# Patient Record
Sex: FEMALE | Race: WHITE | Employment: OTHER | URBAN - METROPOLITAN AREA
[De-identification: names, ages, dates, MRNs, and addresses within clinical notes are randomized per-mention and may not be internally consistent; named-entity substitution may affect disease eponyms.]

---

## 2017-01-04 ENCOUNTER — OFFICE VISIT (OUTPATIENT)
Dept: INTERNAL MEDICINE | Age: 67
End: 2017-01-04

## 2017-01-04 VITALS
WEIGHT: 131.8 LBS | SYSTOLIC BLOOD PRESSURE: 118 MMHG | HEART RATE: 95 BPM | BODY MASS INDEX: 21.96 KG/M2 | HEIGHT: 65 IN | DIASTOLIC BLOOD PRESSURE: 76 MMHG

## 2017-01-04 DIAGNOSIS — J18.9 CAP (COMMUNITY ACQUIRED PNEUMONIA): Primary | ICD-10-CM

## 2017-01-04 PROCEDURE — 99213 OFFICE O/P EST LOW 20 MIN: CPT | Performed by: FAMILY MEDICINE

## 2017-01-04 RX ORDER — AZITHROMYCIN 250 MG/1
TABLET, FILM COATED ORAL
Qty: 6 TABLET | Refills: 0 | Status: SHIPPED | OUTPATIENT
Start: 2017-01-04 | End: 2017-11-16 | Stop reason: ALTCHOICE

## 2017-01-10 ENCOUNTER — TELEPHONE (OUTPATIENT)
Dept: INTERNAL MEDICINE | Age: 67
End: 2017-01-10

## 2017-01-21 DIAGNOSIS — E03.9 ACQUIRED HYPOTHYROIDISM: ICD-10-CM

## 2017-01-21 RX ORDER — LEVOTHYROXINE SODIUM 88 UG/1
TABLET ORAL
Qty: 90 TABLET | Refills: 3 | Status: SHIPPED | OUTPATIENT
Start: 2017-01-21 | End: 2017-07-05 | Stop reason: SDUPTHER

## 2017-05-04 DIAGNOSIS — N39.0 RECURRENT UTI: Primary | ICD-10-CM

## 2017-05-04 RX ORDER — SULFAMETHOXAZOLE AND TRIMETHOPRIM 800; 160 MG/1; MG/1
1 TABLET ORAL 2 TIMES DAILY
Qty: 6 TABLET | Refills: 3 | Status: SHIPPED | OUTPATIENT
Start: 2017-05-04 | End: 2017-10-12 | Stop reason: SDUPTHER

## 2017-05-12 ENCOUNTER — TELEPHONE (OUTPATIENT)
Dept: INTERNAL MEDICINE | Age: 67
End: 2017-05-12

## 2017-06-19 DIAGNOSIS — J30.2 SEASONAL ALLERGIC RHINITIS, UNSPECIFIED ALLERGIC RHINITIS TRIGGER: ICD-10-CM

## 2017-06-20 RX ORDER — FLUTICASONE PROPIONATE 50 MCG
1 SPRAY, SUSPENSION (ML) NASAL DAILY
Qty: 1 BOTTLE | Refills: 3 | Status: SHIPPED | OUTPATIENT
Start: 2017-06-20 | End: 2017-07-05 | Stop reason: SDUPTHER

## 2017-07-05 DIAGNOSIS — E03.9 ACQUIRED HYPOTHYROIDISM: ICD-10-CM

## 2017-07-05 DIAGNOSIS — E78.2 MIXED HYPERLIPIDEMIA: ICD-10-CM

## 2017-07-05 DIAGNOSIS — J30.2 SEASONAL ALLERGIC RHINITIS, UNSPECIFIED ALLERGIC RHINITIS TRIGGER: ICD-10-CM

## 2017-07-06 RX ORDER — FENOFIBRATE 160 MG/1
TABLET ORAL
Qty: 90 TABLET | Refills: 3 | Status: SHIPPED | OUTPATIENT
Start: 2017-07-06 | End: 2018-07-19 | Stop reason: ALTCHOICE

## 2017-07-06 RX ORDER — FLUTICASONE PROPIONATE 50 MCG
1 SPRAY, SUSPENSION (ML) NASAL DAILY
Qty: 3 BOTTLE | Refills: 3 | Status: SHIPPED | OUTPATIENT
Start: 2017-07-06 | End: 2019-05-21 | Stop reason: ALTCHOICE

## 2017-07-06 RX ORDER — LEVOTHYROXINE SODIUM 88 UG/1
TABLET ORAL
Qty: 90 TABLET | Refills: 3 | Status: SHIPPED | OUTPATIENT
Start: 2017-07-06 | End: 2018-04-25 | Stop reason: SDUPTHER

## 2017-10-04 ENCOUNTER — TELEPHONE (OUTPATIENT)
Dept: INTERNAL MEDICINE | Age: 67
End: 2017-10-04

## 2017-10-04 ENCOUNTER — HOSPITAL ENCOUNTER (OUTPATIENT)
Age: 67
Setting detail: SPECIMEN
Discharge: HOME | End: 2017-10-04
Payer: MEDICARE

## 2017-10-04 DIAGNOSIS — R30.0 DYSURIA: ICD-10-CM

## 2017-10-04 DIAGNOSIS — R30.0 DYSURIA: Primary | ICD-10-CM

## 2017-10-04 LAB
BILIRUBIN, POC: NORMAL
BLOOD URINE, POC: NORMAL
CLARITY, POC: CLEAR
COLOR, POC: NORMAL
GLUCOSE URINE, POC: NORMAL
KETONES, POC: NORMAL
LEUKOCYTE EST, POC: NORMAL
NITRITE, POC: NORMAL
PH, POC: 7
PROTEIN, POC: NORMAL
SPECIFIC GRAVITY, POC: 1.02
UROBILINOGEN, POC: NORMAL

## 2017-10-04 PROCEDURE — 87086 URINE CULTURE/COLONY COUNT: CPT

## 2017-10-04 PROCEDURE — 81002 URINALYSIS NONAUTO W/O SCOPE: CPT | Performed by: FAMILY MEDICINE

## 2017-10-04 NOTE — TELEPHONE ENCOUNTER
Pt brought in urine specimen stating that Dr. Merna Jimenez told her to bring in specimen when she has symptoms since she gets UTI's often. Results neg. Send for culture?

## 2017-10-06 ENCOUNTER — NURSE ONLY (OUTPATIENT)
Dept: INTERNAL MEDICINE | Age: 67
End: 2017-10-06

## 2017-10-06 DIAGNOSIS — Z23 NEED FOR INFLUENZA VACCINATION: Primary | ICD-10-CM

## 2017-10-06 LAB — URINE CULTURE, ROUTINE: NORMAL

## 2017-10-06 PROCEDURE — G0008 ADMIN INFLUENZA VIRUS VAC: HCPCS | Performed by: FAMILY MEDICINE

## 2017-10-06 PROCEDURE — 90662 IIV NO PRSV INCREASED AG IM: CPT | Performed by: FAMILY MEDICINE

## 2017-10-06 NOTE — PROGRESS NOTES
Vaccine Information Sheet, \"Influenza - Inactivated\" OR \"Live - Intranasal\"  given to Teena Mills, or parent/legal guardian of  Teena Mills and verbalized understanding. Patient responses:    Have you ever had a reaction to a flu vaccine? No  Are you able to eat eggs without adverse effects? Yes  Do you have any current illness? No  Have you ever had Gu[place] Syndrome? No    Flu vaccine given per order. Please see immunization tab.

## 2017-10-12 ENCOUNTER — TELEPHONE (OUTPATIENT)
Dept: INTERNAL MEDICINE | Age: 67
End: 2017-10-12

## 2017-10-12 ENCOUNTER — HOSPITAL ENCOUNTER (OUTPATIENT)
Age: 67
Setting detail: SPECIMEN
Discharge: HOME | End: 2017-10-12
Payer: MEDICARE

## 2017-10-12 DIAGNOSIS — R30.0 DYSURIA: Primary | ICD-10-CM

## 2017-10-12 DIAGNOSIS — R30.0 DYSURIA: ICD-10-CM

## 2017-10-12 LAB
BILIRUBIN, POC: ABNORMAL
BLOOD URINE, POC: ABNORMAL
CLARITY, POC: ABNORMAL
COLOR, POC: YELLOW
GLUCOSE URINE, POC: ABNORMAL
KETONES, POC: ABNORMAL
LEUKOCYTE EST, POC: ABNORMAL
NITRITE, POC: ABNORMAL
PH, POC: 6
PROTEIN, POC: ABNORMAL
SPECIFIC GRAVITY, POC: 1.02
UROBILINOGEN, POC: ABNORMAL

## 2017-10-12 PROCEDURE — 87186 SC STD MICRODIL/AGAR DIL: CPT

## 2017-10-12 PROCEDURE — 87077 CULTURE AEROBIC IDENTIFY: CPT

## 2017-10-12 PROCEDURE — 87086 URINE CULTURE/COLONY COUNT: CPT

## 2017-10-12 PROCEDURE — 81002 URINALYSIS NONAUTO W/O SCOPE: CPT | Performed by: FAMILY MEDICINE

## 2017-10-12 RX ORDER — SULFAMETHOXAZOLE AND TRIMETHOPRIM 800; 160 MG/1; MG/1
1 TABLET ORAL 2 TIMES DAILY
Qty: 6 TABLET | Refills: 0 | Status: SHIPPED | OUTPATIENT
Start: 2017-10-12 | End: 2017-10-15

## 2017-10-12 NOTE — TELEPHONE ENCOUNTER
Sent, this time, but I will not just do urines when Dr Armani Howell is not here.   If Dr Armani Howell is here, Dr Armani Howell can make that decision

## 2017-10-12 NOTE — TELEPHONE ENCOUNTER
Patient brought in a urine specimen apparently per DR. Manpreet Villalobos. Patient has dysuria. Poct urine dip showed    3+ [facility]  protien  Blood      Urine culture ordered.  Please advise

## 2017-10-14 LAB
ORGANISM: ABNORMAL
URINE CULTURE, ROUTINE: ABNORMAL
URINE CULTURE, ROUTINE: ABNORMAL

## 2017-10-16 RX ORDER — SULFAMETHOXAZOLE AND TRIMETHOPRIM 800; 160 MG/1; MG/1
1 TABLET ORAL 2 TIMES DAILY
Qty: 6 TABLET | Refills: 3 | Status: SHIPPED | OUTPATIENT
Start: 2017-10-16 | End: 2017-11-16 | Stop reason: SDUPTHER

## 2017-11-13 ENCOUNTER — NURSE ONLY (OUTPATIENT)
Dept: INTERNAL MEDICINE | Age: 67
End: 2017-11-13

## 2017-11-13 ENCOUNTER — HOSPITAL ENCOUNTER (OUTPATIENT)
Age: 67
Setting detail: SPECIMEN
Discharge: HOME | End: 2017-11-13
Payer: MEDICARE

## 2017-11-13 DIAGNOSIS — E78.5 HYPERLIPIDEMIA, UNSPECIFIED HYPERLIPIDEMIA TYPE: ICD-10-CM

## 2017-11-13 DIAGNOSIS — E03.9 ACQUIRED HYPOTHYROIDISM: ICD-10-CM

## 2017-11-13 DIAGNOSIS — Z12.11 COLON CANCER SCREENING: ICD-10-CM

## 2017-11-13 DIAGNOSIS — E78.5 HYPERLIPIDEMIA, UNSPECIFIED HYPERLIPIDEMIA TYPE: Primary | ICD-10-CM

## 2017-11-13 DIAGNOSIS — Z23 NEED FOR PNEUMOCOCCAL VACCINATION: ICD-10-CM

## 2017-11-13 LAB
ALBUMIN SERPL-MCNC: 4.4 G/DL (ref 3.9–4.9)
ALP BLD-CCNC: 39 U/L (ref 40–130)
ALT SERPL-CCNC: 10 U/L (ref 0–33)
ANION GAP SERPL CALCULATED.3IONS-SCNC: 15 MEQ/L (ref 7–13)
AST SERPL-CCNC: 19 U/L (ref 0–35)
BASOPHILS ABSOLUTE: 0 K/UL (ref 0–0.2)
BASOPHILS RELATIVE PERCENT: 0.9 %
BILIRUB SERPL-MCNC: 0.3 MG/DL (ref 0–1.2)
BUN BLDV-MCNC: 14 MG/DL (ref 8–23)
CALCIUM SERPL-MCNC: 9.9 MG/DL (ref 8.6–10.2)
CHLORIDE BLD-SCNC: 102 MEQ/L (ref 98–107)
CHOLESTEROL, TOTAL: 214 MG/DL (ref 0–199)
CO2: 25 MEQ/L (ref 22–29)
CREAT SERPL-MCNC: 0.76 MG/DL (ref 0.5–0.9)
EOSINOPHILS ABSOLUTE: 0.1 K/UL (ref 0–0.7)
EOSINOPHILS RELATIVE PERCENT: 1.5 %
GFR AFRICAN AMERICAN: >60
GFR NON-AFRICAN AMERICAN: >60
GLOBULIN: 2.3 G/DL (ref 2.3–3.5)
GLUCOSE BLD-MCNC: 94 MG/DL (ref 74–109)
HCT VFR BLD CALC: 38.5 % (ref 37–47)
HDLC SERPL-MCNC: 59 MG/DL (ref 40–59)
HEMOGLOBIN: 12.4 G/DL (ref 12–16)
LDL CHOLESTEROL CALCULATED: 140 MG/DL (ref 0–129)
LYMPHOCYTES ABSOLUTE: 1.3 K/UL (ref 1–4.8)
LYMPHOCYTES RELATIVE PERCENT: 31.6 %
MCH RBC QN AUTO: 29.4 PG (ref 27–31.3)
MCHC RBC AUTO-ENTMCNC: 32.3 % (ref 33–37)
MCV RBC AUTO: 90.8 FL (ref 82–100)
MONOCYTES ABSOLUTE: 0.4 K/UL (ref 0.2–0.8)
MONOCYTES RELATIVE PERCENT: 9.9 %
NEUTROPHILS ABSOLUTE: 2.3 K/UL (ref 1.4–6.5)
NEUTROPHILS RELATIVE PERCENT: 56.1 %
PDW BLD-RTO: 14.6 % (ref 11.5–14.5)
PLATELET # BLD: 294 K/UL (ref 130–400)
POTASSIUM SERPL-SCNC: 4.8 MEQ/L (ref 3.5–5.1)
RBC # BLD: 4.23 M/UL (ref 4.2–5.4)
SODIUM BLD-SCNC: 142 MEQ/L (ref 132–144)
TOTAL PROTEIN: 6.7 G/DL (ref 6.4–8.1)
TRIGL SERPL-MCNC: 74 MG/DL (ref 0–200)
TSH REFLEX: 3.16 UIU/ML (ref 0.27–4.2)
WBC # BLD: 4.1 K/UL (ref 4.8–10.8)

## 2017-11-13 PROCEDURE — 90670 PCV13 VACCINE IM: CPT | Performed by: FAMILY MEDICINE

## 2017-11-13 PROCEDURE — 80053 COMPREHEN METABOLIC PANEL: CPT

## 2017-11-13 PROCEDURE — 36415 COLL VENOUS BLD VENIPUNCTURE: CPT | Performed by: FAMILY MEDICINE

## 2017-11-13 PROCEDURE — 85025 COMPLETE CBC W/AUTO DIFF WBC: CPT

## 2017-11-13 PROCEDURE — 84443 ASSAY THYROID STIM HORMONE: CPT

## 2017-11-13 PROCEDURE — G0009 ADMIN PNEUMOCOCCAL VACCINE: HCPCS | Performed by: FAMILY MEDICINE

## 2017-11-13 PROCEDURE — 80061 LIPID PANEL: CPT

## 2017-11-16 ENCOUNTER — OFFICE VISIT (OUTPATIENT)
Dept: INTERNAL MEDICINE | Age: 67
End: 2017-11-16

## 2017-11-16 VITALS
HEART RATE: 90 BPM | HEIGHT: 65 IN | DIASTOLIC BLOOD PRESSURE: 74 MMHG | BODY MASS INDEX: 22.46 KG/M2 | WEIGHT: 134.8 LBS | SYSTOLIC BLOOD PRESSURE: 128 MMHG

## 2017-11-16 DIAGNOSIS — Z00.00 ROUTINE GENERAL MEDICAL EXAMINATION AT A HEALTH CARE FACILITY: Primary | ICD-10-CM

## 2017-11-16 DIAGNOSIS — Z12.11 COLON CANCER SCREENING: ICD-10-CM

## 2017-11-16 LAB
CONTROL: NORMAL
HEMOCCULT STL QL: NEGATIVE

## 2017-11-16 PROCEDURE — 82274 ASSAY TEST FOR BLOOD FECAL: CPT | Performed by: FAMILY MEDICINE

## 2017-11-16 PROCEDURE — G0438 PPPS, INITIAL VISIT: HCPCS | Performed by: FAMILY MEDICINE

## 2017-11-16 RX ORDER — SULFAMETHOXAZOLE AND TRIMETHOPRIM 800; 160 MG/1; MG/1
1 TABLET ORAL 2 TIMES DAILY
Qty: 12 TABLET | Refills: 3 | Status: SHIPPED | OUTPATIENT
Start: 2017-11-16 | End: 2018-11-27 | Stop reason: SDUPTHER

## 2017-11-16 ASSESSMENT — LIFESTYLE VARIABLES
HOW OFTEN DO YOU HAVE SIX OR MORE DRINKS ON ONE OCCASION: 0
HOW OFTEN DO YOU HAVE A DRINK CONTAINING ALCOHOL: 2
HOW MANY STANDARD DRINKS CONTAINING ALCOHOL DO YOU HAVE ON A TYPICAL DAY: 0
AUDIT-C TOTAL SCORE: 2

## 2017-11-16 ASSESSMENT — ANXIETY QUESTIONNAIRES: GAD7 TOTAL SCORE: 0

## 2017-11-16 ASSESSMENT — PATIENT HEALTH QUESTIONNAIRE - PHQ9: SUM OF ALL RESPONSES TO PHQ QUESTIONS 1-9: 0

## 2017-11-16 NOTE — PROGRESS NOTES
Medicare Annual Wellness Visit  Name: Magaly Vazquez Date: 2017   MRN: [id number] Sex: Female   Age: 74 y.o. Ethnicity: Non-/Non    : 1950 Race: Shantanu Pederson is here for Medicare AWV    Screenings for behavioral, psychosocial and functional/safety risks, and cognitive dysfunction are all negative except as indicated below. These results, as well as other patient data from the [address] form, are documented in Flowsheets linked to this Encounter. No Known Allergies    Prior to Visit Medications    Medication Sig Taking? Authorizing Provider   sulfamethoxazole-trimethoprim (BACTRIM DS) 800-160 MG per tablet Take 1 tablet by mouth 2 times daily for 6 days Yes Mk Barbosa MD   fluticasone (FLONASE) 50 MCG/ACT nasal spray 1 spray by Nasal route daily Yes Mk Barbosa MD   levothyroxine (SYNTHROID) 88 MCG tablet TAKE 1 TABLET BY MOUTH DAILY Yes Mk Barbosa MD   fenofibrate 160 MG tablet TAKE 1 TABLET BY MOUTH DAILY Yes Mk Barbosa MD   sertraline (ZOLOFT) 25 MG tablet Take 1 tablet by mouth daily Yes Mk Barbosa MD   levothyroxine (LEVOTHROID) 88 MCG tablet Take 1 tablet by mouth daily Yes Mk Barbosa MD   nitrofurantoin (MACRODANTIN) 50 MG capsule Take 1 capsule by mouth Twice a Week  Patient taking differently: Take 50 mg by mouth Twice a Week Taking every other day. Yes Mk Barbosa MD   Probiotic Product (PROBIOTIC DAILY PO) Take 1 tablet by mouth daily. Yes Historical Provider, MD   aspirin 81 MG tablet Take 81 mg by mouth daily. Yes Historical Provider, MD   Omega 3 1000 MG CAPS Take 1 Can by mouth daily. Yes Historical Provider, MD   [facility] by Does not apply route. Yes Historical Provider, MD   therapeutic multivitamin-minerals (THERAGRAN-M) tablet Take 1 tablet by mouth daily. Yes Historical Provider, MD   Misc Natural Products (LUTEIN 20 PO) Take  by mouth.  Yes Historical Provider, MD   BIOTIN FORTE PO Take  by mouth. Yes Historical Provider, MD   Vitamin D (CHOLECALCIFEROL) 1000 UNITS CAPS capsule Take 1,000 Units by mouth daily. Yes Historical Provider, MD       Past Medical History:   Diagnosis Date    Abnormal white blood cell (WBC) count 11/15/2016    Hiatal hernia     Hyperlipidemia     Hypothyroidism     Pap smear for cervical cancer screening 9/2011    normal    prophylaxis for Recurrent UTI 12/18/2013    Screening mammogram 9/2011    normal     Past Surgical History:   Procedure Laterality Date    CHOLECYSTECTOMY      COLONOSCOPY      when she turned 50, normal    KNEE SURGERY      right knee replacement       Family History   Problem Relation Age of Onset    High Cholesterol Mother     High Cholesterol Father        CareTeam (Including outside providers/suppliers regularly involved in providing care):   Patient Care Team:  Ravi Barone MD as PCP - General (Family Medicine)  Bo Hernandez,  as Surgeon (Orthopedic Surgery)  Karan Mccall DO as Consulting Physician (Obstetrics & Gynecology)  Jeremie Martini (Optometry)    Wt Readings from Last 3 Encounters:   11/16/17 134 lb 12.8 oz (61.1 kg)   01/04/17 131 lb 12.8 oz (59.8 kg)   11/15/16 133 lb (60.3 kg)     Vitals:    11/16/17 0905   BP: 128/74   Site: Right Arm   Position: Sitting   Cuff Size: Medium Adult   Pulse: 90   Weight: 134 lb 12.8 oz (61.1 kg)   Height: 5' 5\" (1.651 m)       Patient's complete Health Risk Assessment and screening values have been reviewed and are found in Flowsheets. The following problems were reviewed today and where indicated follow up appointments were made and/or referrals ordered.     Positive Risk Factor Screenings with Interventions:     Safety:  Safety  Do you have working smoke detectors?: Yes  Have all throw rugs been removed or fastened?: (!) No  Do you have non-slip mats in all bathtubs?: (!) No  Do all of your stairways have a railing or banister?: Yes  Are your doorways, halls and stairs free of clutter?: Yes  Do you always fasten your seatbelt when you are in a car?: Yes  Safety Interventions:  · Home safety tips provided      Personalized Preventive Plan   Current Health Maintenance Status  Immunization History   Administered Date(s) Administered    Influenza Virus Vaccine 10/26/2011, 11/06/2013, 10/01/2014, 09/30/2015    Influenza, High Dose 10/06/2017    Influenza, Quadv, 3 Years and older, IM 11/11/2016    Pneumococcal 13-valent Conjugate (Marny Batter) 11/13/2017    Tdap (Boostrix, Adacel) 12/05/2013        Health Maintenance   Topic Date Due    HIV screen  11/16/2018 (Originally 5/22/1965)    Cervical cancer screen  10/14/2018    Pneumococcal low/med risk (2 of 2 - PPSV23) 11/13/2018    Colon Cancer Screen FIT/FOBT  11/16/2018    Breast cancer screen  10/13/2019    Lipid screen  11/13/2022    DTaP/Tdap/Td vaccine (2 - Td) 12/05/2023    Zostavax vaccine  Addressed    DEXA (modify frequency per FRAX score)  Completed    Flu vaccine  Completed    Hepatitis C screen  Completed     Recommendations for Preventive Services Due: see orders. Recommended screening schedule for the next 5-10 years is provided to the patient in written form: see Patient Instructions/AVS.    Cardiovascular Disease Risk Counseling: Assessed the patient's risk to develop cardiovascular disease and reviewed main risk factors.    Reviewed steps to reduce disease risk including:   · Quitting tobacco use, reducing amount smoked, or not starting the habit  · Making healthy food choices  · Being physically active and gradualy increasing activity levels   · Reduce weight and determine a healthy BMI goal  · Monitor blood pressure and treat if higher than 140/90 mmHg  · Maintain blood total cholesterol levels under 5 mmol/l or 190 mg/dl  · Maintain LDL cholesterol levels under 3.0 mmol/l or 115 mg/dl   · Control blood glucose levels  · Consider taking aspirin (75 mg daily), once blood pressure is controlled   Provided a follow up plan. Time spent (minutes): 5      Medicare Annual Wellness Visit  Name: Edelmira Cardenas Date: 2017   MRN: [id number] Sex: Female   Age: 74 y.o. Ethnicity: Non-/Non    : 1950 Race: Inga Peguero is here for Medicare AWV    Screenings for behavioral, psychosocial and functional/safety risks, and cognitive dysfunction are all negative except as indicated below. These results, as well as other patient data from the [address] form, are documented in Flowsheets linked to this Encounter. No Known Allergies    Prior to Visit Medications    Medication Sig Taking? Authorizing Provider   sulfamethoxazole-trimethoprim (BACTRIM DS) 800-160 MG per tablet Take 1 tablet by mouth 2 times daily for 6 days Yes Easter Bence, MD   fluticasone (FLONASE) 50 MCG/ACT nasal spray 1 spray by Nasal route daily Yes Easter Bence, MD   levothyroxine (SYNTHROID) 88 MCG tablet TAKE 1 TABLET BY MOUTH DAILY Yes Easter Bence, MD   fenofibrate 160 MG tablet TAKE 1 TABLET BY MOUTH DAILY Yes Easter Bence, MD   sertraline (ZOLOFT) 25 MG tablet Take 1 tablet by mouth daily Yes Easter Bence, MD   levothyroxine (LEVOTHROID) 88 MCG tablet Take 1 tablet by mouth daily Yes Easter Bence, MD   nitrofurantoin (MACRODANTIN) 50 MG capsule Take 1 capsule by mouth Twice a Week  Patient taking differently: Take 50 mg by mouth Twice a Week Taking every other day. Yes Easter Bence, MD   Probiotic Product (PROBIOTIC DAILY PO) Take 1 tablet by mouth daily. Yes Historical Provider, MD   aspirin 81 MG tablet Take 81 mg by mouth daily. Yes Historical Provider, MD   Omega 3 1000 MG CAPS Take 1 Can by mouth daily. Yes Historical Provider, MD   [facility] by Does not apply route. Yes Historical Provider, MD   therapeutic multivitamin-minerals (THERAGRAN-M) tablet Take 1 tablet by mouth daily. Yes Historical Provider, MD   Misc Natural Products (LUTEIN 20 PO) Take  by mouth.  Yes Historical

## 2017-11-16 NOTE — PATIENT INSTRUCTIONS
Personalized Preventive Plan for Constantine Mortimer - 11/16/2017  Medicare offers a range of preventive health benefits. Some of the tests and screenings are paid in full while other may be subject to a deductible, co-insurance, and/or copay. Some of these benefits include a comprehensive review of your medical history including lifestyle, illnesses that may run in your family, and various assessments and screenings as appropriate. After reviewing your medical record and screening and assessments performed today your provider may have ordered immunizations, labs, imaging, and/or referrals for you. A list of these orders (if applicable) as well as your Preventive Care list are included within your After Visit Summary for your review. Other Preventive Recommendations:    · A preventive eye exam performed by an eye specialist is recommended every 1-2 years to screen for glaucoma; cataracts, macular degeneration, and other eye disorders. · A preventive dental visit is recommended every 6 months. · Try to get at least 150 minutes of exercise per week or 10,000 steps per day on a pedometer . · Order or download the FREE \"Exercise & Physical Activity: Your Everyday Guide\" from The [employer]. Call 0-411.577.9095 or search The [employer] online. · You need 2052-0875 mg of calcium and 4460-2945 IU of vitamin D per day. It is possible to meet your calcium requirement with diet alone, but a vitamin D supplement is usually necessary to meet this goal.  · When exposed to the sun, use a sunscreen that protects against both UVA and UVB radiation with an SPF of 30 or greater. Reapply every 2 to 3 hours or after sweating, drying off with a towel, or swimming. · Always wear a seat belt when traveling in a car. Always wear a helmet when riding a bicycle or motorcycle.

## 2017-11-19 ENCOUNTER — OFFICE VISIT (OUTPATIENT)
Dept: FAMILY MEDICINE CLINIC | Age: 67
End: 2017-11-19

## 2017-11-19 VITALS
HEART RATE: 87 BPM | HEIGHT: 65 IN | BODY MASS INDEX: 22.72 KG/M2 | SYSTOLIC BLOOD PRESSURE: 124 MMHG | WEIGHT: 136.38 LBS | OXYGEN SATURATION: 99 % | RESPIRATION RATE: 14 BRPM | TEMPERATURE: 97.8 F | DIASTOLIC BLOOD PRESSURE: 72 MMHG

## 2017-11-19 DIAGNOSIS — H66.002 ACUTE SUPPURATIVE OTITIS MEDIA OF LEFT EAR WITHOUT SPONTANEOUS RUPTURE OF TYMPANIC MEMBRANE, RECURRENCE NOT SPECIFIED: Primary | ICD-10-CM

## 2017-11-19 PROCEDURE — G8420 CALC BMI NORM PARAMETERS: HCPCS | Performed by: NURSE PRACTITIONER

## 2017-11-19 PROCEDURE — G8427 DOCREV CUR MEDS BY ELIG CLIN: HCPCS | Performed by: NURSE PRACTITIONER

## 2017-11-19 PROCEDURE — 3014F SCREEN MAMMO DOC REV: CPT | Performed by: NURSE PRACTITIONER

## 2017-11-19 PROCEDURE — 1123F ACP DISCUSS/DSCN MKR DOCD: CPT | Performed by: NURSE PRACTITIONER

## 2017-11-19 PROCEDURE — 4040F PNEUMOC VAC/ADMIN/RCVD: CPT | Performed by: NURSE PRACTITIONER

## 2017-11-19 PROCEDURE — 3017F COLORECTAL CA SCREEN DOC REV: CPT | Performed by: NURSE PRACTITIONER

## 2017-11-19 PROCEDURE — 99213 OFFICE O/P EST LOW 20 MIN: CPT | Performed by: NURSE PRACTITIONER

## 2017-11-19 PROCEDURE — G8484 FLU IMMUNIZE NO ADMIN: HCPCS | Performed by: NURSE PRACTITIONER

## 2017-11-19 PROCEDURE — G8400 PT W/DXA NO RESULTS DOC: HCPCS | Performed by: NURSE PRACTITIONER

## 2017-11-19 PROCEDURE — 1090F PRES/ABSN URINE INCON ASSESS: CPT | Performed by: NURSE PRACTITIONER

## 2017-11-19 PROCEDURE — 1036F TOBACCO NON-USER: CPT | Performed by: NURSE PRACTITIONER

## 2017-11-19 RX ORDER — AMOXICILLIN AND CLAVULANATE POTASSIUM 875; 125 MG/1; MG/1
1 TABLET, FILM COATED ORAL 2 TIMES DAILY
Qty: 20 TABLET | Refills: 0 | Status: SHIPPED | OUTPATIENT
Start: 2017-11-19 | End: 2017-11-29

## 2017-11-19 RX ORDER — METHYLPREDNISOLONE 4 MG/1
TABLET ORAL
Qty: 1 KIT | Refills: 0 | Status: SHIPPED | OUTPATIENT
Start: 2017-11-19 | End: 2018-07-19 | Stop reason: ALTCHOICE

## 2017-11-22 ASSESSMENT — ENCOUNTER SYMPTOMS
SINUS PRESSURE: 0
SORE THROAT: 0
NAUSEA: 0
COUGH: 0
WHEEZING: 0
SHORTNESS OF BREATH: 0
EYE DISCHARGE: 0

## 2017-11-23 NOTE — PROGRESS NOTES
Subjective:      Patient ID: Teena Mills is a 74 y.o. female who presents today for:  Chief Complaint   Patient presents with    Otalgia     x 4 days. complains of left ear pain, swelling. denies sore throat, swollen glands. has tried Advil with moderate relief. Otalgia    There is pain in the left ear. This is a new problem. The current episode started in the past 7 days. The problem occurs constantly. The problem has been gradually worsening. There has been no fever. Associated symptoms include headaches. Pertinent negatives include no coughing, ear discharge, hearing loss, neck pain or sore throat. She has tried NSAIDs for the symptoms. The treatment provided mild relief. There is no history of a chronic ear infection. Past Medical History:   Diagnosis Date    Abnormal white blood cell (WBC) count 11/15/2016    Hiatal hernia     Hyperlipidemia     Hypothyroidism     Pap smear for cervical cancer screening 9/2011    normal    prophylaxis for Recurrent UTI 12/18/2013    Screening mammogram 9/2011    normal     Current Outpatient Prescriptions on File Prior to Visit   Medication Sig Dispense Refill    sulfamethoxazole-trimethoprim (BACTRIM DS) 800-160 MG per tablet Take 1 tablet by mouth 2 times daily for 6 days 12 tablet 3    fluticasone (FLONASE) 50 MCG/ACT nasal spray 1 spray by Nasal route daily 3 Bottle 3    levothyroxine (SYNTHROID) 88 MCG tablet TAKE 1 TABLET BY MOUTH DAILY 90 tablet 3    fenofibrate 160 MG tablet TAKE 1 TABLET BY MOUTH DAILY 90 tablet 3    sertraline (ZOLOFT) 25 MG tablet Take 1 tablet by mouth daily 30 tablet 3    levothyroxine (LEVOTHROID) 88 MCG tablet Take 1 tablet by mouth daily 90 tablet 3    nitrofurantoin (MACRODANTIN) 50 MG capsule Take 1 capsule by mouth Twice a Week (Patient taking differently: Take 50 mg by mouth Twice a Week Taking every other day.) 90 capsule 1    Probiotic Product (PROBIOTIC DAILY PO) Take 1 tablet by mouth daily.       aspirin 81 MG tablet Take 81 mg by mouth daily.  Omega 3 1000 MG CAPS Take 1 Can by mouth daily.  ECHINACEA by Does not apply route.  therapeutic multivitamin-minerals (THERAGRAN-M) tablet Take 1 tablet by mouth daily.  Misc Natural Products (LUTEIN 20 PO) Take  by mouth.  BIOTIN FORTE PO Take  by mouth.  Vitamin D (CHOLECALCIFEROL) 1000 UNITS CAPS capsule Take 1,000 Units by mouth daily. No current facility-administered medications on file prior to visit. Past Surgical History:   Procedure Laterality Date    CHOLECYSTECTOMY      COLONOSCOPY      when she turned 50, normal    KNEE SURGERY      right knee replacement     Family History   Problem Relation Age of Onset    High Cholesterol Mother     High Cholesterol Father      Social History     Social History    Marital status:      Spouse name: N/A    Number of children: N/A    Years of education: N/A     Occupational History    Not on file. Social History Main Topics    Smoking status: Never Smoker    Smokeless tobacco: Never Used    Alcohol use 0.0 oz/week      Comment: socially, moderately    Drug use: No    Sexual activity: Not on file     Other Topics Concern    Not on file     Social History Narrative    No narrative on file     Allergies:  Review of patient's allergies indicates no known allergies. Review of Systems   Constitutional: Negative for chills, diaphoresis and fever. HENT: Positive for ear pain. Negative for congestion, ear discharge, hearing loss, postnasal drip, sinus pressure and sore throat. Eyes: Negative for discharge and visual disturbance. Respiratory: Negative for cough, shortness of breath and wheezing. Cardiovascular: Negative for chest pain and palpitations. Gastrointestinal: Negative for nausea. Musculoskeletal: Negative for neck pain. Neurological: Positive for headaches. Negative for dizziness, syncope and light-headedness.        Objective:   /74 Side effects, adverse effects of the medication prescribed today, as well as treatment plan/ rationale and result expectations have been discussed with the patient who expresses understanding and desires to proceed. Pt instructions reviewed and given to patient.     Close follow up to evaluate treatment results and for coordination of care. I have reviewed the patient's medical history in detail and updated the computerized patient record.     Kenia Rogers NP

## 2018-04-25 DIAGNOSIS — E03.9 ACQUIRED HYPOTHYROIDISM: ICD-10-CM

## 2018-04-25 RX ORDER — LEVOTHYROXINE SODIUM 88 UG/1
TABLET ORAL
Qty: 90 TABLET | Refills: 3 | Status: SHIPPED | OUTPATIENT
Start: 2018-04-25 | End: 2018-07-19 | Stop reason: SDUPTHER

## 2018-07-19 ENCOUNTER — OFFICE VISIT (OUTPATIENT)
Dept: INTERNAL MEDICINE | Age: 68
End: 2018-07-19
Payer: MEDICARE

## 2018-07-19 VITALS
WEIGHT: 135 LBS | BODY MASS INDEX: 22.49 KG/M2 | DIASTOLIC BLOOD PRESSURE: 78 MMHG | HEIGHT: 65 IN | SYSTOLIC BLOOD PRESSURE: 138 MMHG | OXYGEN SATURATION: 99 % | HEART RATE: 86 BPM

## 2018-07-19 DIAGNOSIS — S13.9XXA NECK SPRAIN, INITIAL ENCOUNTER: Primary | ICD-10-CM

## 2018-07-19 PROCEDURE — 3014F SCREEN MAMMO DOC REV: CPT | Performed by: FAMILY MEDICINE

## 2018-07-19 PROCEDURE — 1101F PT FALLS ASSESS-DOCD LE1/YR: CPT | Performed by: FAMILY MEDICINE

## 2018-07-19 PROCEDURE — G8427 DOCREV CUR MEDS BY ELIG CLIN: HCPCS | Performed by: FAMILY MEDICINE

## 2018-07-19 PROCEDURE — 1036F TOBACCO NON-USER: CPT | Performed by: FAMILY MEDICINE

## 2018-07-19 PROCEDURE — 1123F ACP DISCUSS/DSCN MKR DOCD: CPT | Performed by: FAMILY MEDICINE

## 2018-07-19 PROCEDURE — G8400 PT W/DXA NO RESULTS DOC: HCPCS | Performed by: FAMILY MEDICINE

## 2018-07-19 PROCEDURE — 3017F COLORECTAL CA SCREEN DOC REV: CPT | Performed by: FAMILY MEDICINE

## 2018-07-19 PROCEDURE — 4040F PNEUMOC VAC/ADMIN/RCVD: CPT | Performed by: FAMILY MEDICINE

## 2018-07-19 PROCEDURE — 99214 OFFICE O/P EST MOD 30 MIN: CPT | Performed by: FAMILY MEDICINE

## 2018-07-19 PROCEDURE — G8420 CALC BMI NORM PARAMETERS: HCPCS | Performed by: FAMILY MEDICINE

## 2018-07-19 PROCEDURE — 1090F PRES/ABSN URINE INCON ASSESS: CPT | Performed by: FAMILY MEDICINE

## 2018-07-19 RX ORDER — METHYLPREDNISOLONE 4 MG/1
TABLET ORAL
Qty: 1 KIT | Refills: 0 | Status: SHIPPED | OUTPATIENT
Start: 2018-07-19 | End: 2018-07-28

## 2018-07-19 RX ORDER — TIZANIDINE 4 MG/1
4 TABLET ORAL NIGHTLY
Qty: 10 TABLET | Refills: 0 | Status: SHIPPED | OUTPATIENT
Start: 2018-07-19 | End: 2018-07-28 | Stop reason: SDUPTHER

## 2018-07-19 NOTE — PROGRESS NOTES
therapeutic multivitamin-minerals (THERAGRAN-M) tablet Take 1 tablet by mouth daily.  Misc Natural Products (LUTEIN 20 PO) Take  by mouth.  BIOTIN FORTE PO Take  by mouth.  Vitamin D (CHOLECALCIFEROL) 1000 UNITS CAPS capsule Take 1,000 Units by mouth daily. No current facility-administered medications on file prior to visit. No Known Allergies    Chief Complaint   Patient presents with    Muscle Pain     right side neck and shoulder, no injury. started after doing PT for knee injury. pain just not going away. HPI  Symptoms:neck sprain  Location:right side of the neck, radiates down to shoulder  Quality:dull, ache  Severity:8/10 at worst, 5/10  Duration:since end of March  Timing:constant  Context:was doing PT for total knee on the right when she did an exercise that caused her right sided neck pain   Alleviating/aggravting factors:worse with movement   Muscle relaxer x 2 months did not help  Associated signs & symptoms:  Limited ROM looking to the right   No numbness/tingling or weakness in the right arm       Review of Systems  Metabolic/Endocrine: Negative for cold intolerance, heat intolerance, polydipsia and polyphagia. No unintended weight loss or weight gain. Neuro/Psychiatric: Negative for gait disturbance. Negative for psychiatric symptoms. Dermatologic: Negative for pruritus and rash. Musculoskeletal: + muscle pain neck. .    Physical Exam  Vitals:    07/19/18 0844   BP: 138/78   Pulse: 86   SpO2: 99%   Body mass index is 22.47 kg/m². Physical Exam   Neck: Neck supple. Muscular tenderness present. No spinous process tenderness present. Decreased range of motion present. Constitutional:  Appears well-developed and well-nourished. No distress. HENT:   Head: Normocephalic and atraumatic. Right Ear: External ear normal.   Left Ear: External ear normal.   Nose: Nose normal.   Eyes: Conjunctivae and EOM are normal.  Right eye exhibits no discharge.  Left eye exhibits no discharge. No scleral icterus. Musculoskeletal: Normal range of motion. Neurological: alert. Skin: not diaphoretic. Psychiatric: normal mood and affect. behavior is normal. Judgment and thought content normal.   Nursing note and vitals reviewed. Assessment:  Ashia Sauer was seen today for muscle pain. Diagnoses and all orders for this visit:    Neck sprain, initial encounter  -     methylPREDNISolone (MEDROL, DARIA,) 4 MG tablet; Take by mouth. -     tiZANidine (ZANAFLEX) 4 MG tablet; Take 1 tablet by mouth nightly    hand out provided, had a lengthy discussion with patient about treatment, it's side effects, the diagnosis & etiology of symptoms, along with management and expectations of outcome with the recommended treatment. Patient verbalized understanding. Return if symptoms worsen or fail to improve.

## 2018-07-19 NOTE — PATIENT INSTRUCTIONS
Patient Education        Neck Strain or Sprain: Rehab Exercises  Your Care Instructions  Here are some examples of typical rehabilitation exercises for your condition. Start each exercise slowly. Ease off the exercise if you start to have pain. Your doctor or physical therapist will tell you when you can start these exercises and which ones will work best for you. How to do the exercises  Neck rotation    1. Sit in a firm chair, or stand up straight. 2. Keeping your chin level, turn your head to the right, and hold for 15 to 30 seconds. 3. Turn your head to the left and hold for 15 to 30 seconds. 4. Repeat 2 to 4 times to each side. Neck stretches    1. Look straight ahead, and tip your right ear to your right shoulder. Do not let your left shoulder rise up as you tip your head to the right. 2. Hold for 15 to 30 seconds. 3. Tilt your head to the left. Do not let your right shoulder rise up as you tip your head to the left. 4. Hold for 15 to 30 seconds. 5. Repeat 2 to 4 times to each side. Forward neck flexion    1. Sit in a firm chair, or stand up straight. 2. Bend your head forward. 3. Hold for 15 to 30 seconds. 4. Repeat 2 to 4 times. Lateral (side) bend strengthening    1. With your right hand, place your first two fingers on your right temple. 2. Start to bend your head to the side while using gentle pressure from your fingers to keep your head from bending. 3. Hold for about 6 seconds. 4. Repeat 8 to 12 times. 5. Switch hands and repeat the same exercise on your left side. Forward bend strengthening    1. Place your first two fingers of either hand on your forehead. 2. Start to bend your head forward while using gentle pressure from your fingers to keep your head from bending. 3. Hold for about 6 seconds. 4. Repeat 8 to 12 times. Neutral position strengthening    1. Using one hand, place your fingertips on the back of your head at the top of your neck.   2. Start to bend your head

## 2018-07-27 ENCOUNTER — TELEPHONE (OUTPATIENT)
Dept: INTERNAL MEDICINE | Age: 68
End: 2018-07-27

## 2018-07-27 DIAGNOSIS — S13.9XXA NECK SPRAIN, INITIAL ENCOUNTER: ICD-10-CM

## 2018-07-28 RX ORDER — TIZANIDINE 4 MG/1
4 TABLET ORAL NIGHTLY
Qty: 20 TABLET | Refills: 0 | Status: SHIPPED | OUTPATIENT
Start: 2018-07-28 | End: 2019-05-09

## 2018-07-30 ENCOUNTER — TELEPHONE (OUTPATIENT)
Dept: INTERNAL MEDICINE | Age: 68
End: 2018-07-30

## 2018-07-30 NOTE — TELEPHONE ENCOUNTER
Sathish Uri called her insurance and [employer] about the compound formula for her neck cream.  She said the phone is [phone]  Confirmation number [id number]

## 2018-10-05 ENCOUNTER — NURSE ONLY (OUTPATIENT)
Dept: INTERNAL MEDICINE | Age: 68
End: 2018-10-05
Payer: MEDICARE

## 2018-10-05 DIAGNOSIS — Z23 NEED FOR INFLUENZA VACCINATION: Primary | ICD-10-CM

## 2018-10-05 PROCEDURE — G0008 ADMIN INFLUENZA VIRUS VAC: HCPCS | Performed by: FAMILY MEDICINE

## 2018-10-05 PROCEDURE — 90662 IIV NO PRSV INCREASED AG IM: CPT | Performed by: FAMILY MEDICINE

## 2018-11-14 ENCOUNTER — NURSE ONLY (OUTPATIENT)
Dept: INTERNAL MEDICINE | Age: 68
End: 2018-11-14

## 2018-11-14 DIAGNOSIS — E03.9 ACQUIRED HYPOTHYROIDISM: ICD-10-CM

## 2018-11-14 DIAGNOSIS — D72.9 ABNORMAL WHITE BLOOD CELL (WBC) COUNT: ICD-10-CM

## 2018-11-14 DIAGNOSIS — E78.5 HYPERLIPIDEMIA, UNSPECIFIED HYPERLIPIDEMIA TYPE: ICD-10-CM

## 2018-11-14 DIAGNOSIS — E03.9 ACQUIRED HYPOTHYROIDISM: Primary | ICD-10-CM

## 2018-11-14 DIAGNOSIS — E78.5 HYPERLIPIDEMIA, UNSPECIFIED HYPERLIPIDEMIA TYPE: Primary | ICD-10-CM

## 2018-11-14 LAB
ALBUMIN SERPL-MCNC: 4.7 G/DL (ref 3.9–4.9)
ALP BLD-CCNC: 67 U/L (ref 40–130)
ALT SERPL-CCNC: 13 U/L (ref 0–33)
ANION GAP SERPL CALCULATED.3IONS-SCNC: 14 MEQ/L (ref 7–13)
AST SERPL-CCNC: 20 U/L (ref 0–35)
BASOPHILS ABSOLUTE: 0 K/UL (ref 0–0.2)
BASOPHILS RELATIVE PERCENT: 0.6 %
BILIRUB SERPL-MCNC: 0.4 MG/DL (ref 0–1.2)
BUN BLDV-MCNC: 14 MG/DL (ref 8–23)
CALCIUM SERPL-MCNC: 10.2 MG/DL (ref 8.6–10.2)
CHLORIDE BLD-SCNC: 100 MEQ/L (ref 98–107)
CHOLESTEROL, TOTAL: 265 MG/DL (ref 0–199)
CO2: 28 MEQ/L (ref 22–29)
CREAT SERPL-MCNC: 0.61 MG/DL (ref 0.5–0.9)
EOSINOPHILS ABSOLUTE: 0.1 K/UL (ref 0–0.7)
EOSINOPHILS RELATIVE PERCENT: 1.4 %
GFR AFRICAN AMERICAN: >60
GFR NON-AFRICAN AMERICAN: >60
GLOBULIN: 2.9 G/DL (ref 2.3–3.5)
GLUCOSE BLD-MCNC: 98 MG/DL (ref 74–109)
HCT VFR BLD CALC: 41.2 % (ref 37–47)
HDLC SERPL-MCNC: 58 MG/DL (ref 40–59)
HEMOGLOBIN: 14.1 G/DL (ref 12–16)
LDL CHOLESTEROL CALCULATED: 184 MG/DL (ref 0–129)
LYMPHOCYTES ABSOLUTE: 1.5 K/UL (ref 1–4.8)
LYMPHOCYTES RELATIVE PERCENT: 22.3 %
MCH RBC QN AUTO: 31.3 PG (ref 27–31.3)
MCHC RBC AUTO-ENTMCNC: 34.3 % (ref 33–37)
MCV RBC AUTO: 91.4 FL (ref 82–100)
MONOCYTES ABSOLUTE: 0.7 K/UL (ref 0.2–0.8)
MONOCYTES RELATIVE PERCENT: 10.2 %
NEUTROPHILS ABSOLUTE: 4.5 K/UL (ref 1.4–6.5)
NEUTROPHILS RELATIVE PERCENT: 65.5 %
PDW BLD-RTO: 13.7 % (ref 11.5–14.5)
PLATELET # BLD: 240 K/UL (ref 130–400)
POTASSIUM SERPL-SCNC: 4.6 MEQ/L (ref 3.5–5.1)
RBC # BLD: 4.5 M/UL (ref 4.2–5.4)
SODIUM BLD-SCNC: 142 MEQ/L (ref 132–144)
TOTAL PROTEIN: 7.6 G/DL (ref 6.4–8.1)
TRIGL SERPL-MCNC: 114 MG/DL (ref 0–200)
TSH REFLEX: 2.37 UIU/ML (ref 0.27–4.2)
WBC # BLD: 6.8 K/UL (ref 4.8–10.8)

## 2018-11-27 ENCOUNTER — OFFICE VISIT (OUTPATIENT)
Dept: INTERNAL MEDICINE | Age: 68
End: 2018-11-27
Payer: MEDICARE

## 2018-11-27 ENCOUNTER — TELEPHONE (OUTPATIENT)
Dept: INTERNAL MEDICINE | Age: 68
End: 2018-11-27

## 2018-11-27 VITALS
HEIGHT: 65 IN | TEMPERATURE: 97.6 F | WEIGHT: 136.6 LBS | DIASTOLIC BLOOD PRESSURE: 80 MMHG | HEART RATE: 83 BPM | SYSTOLIC BLOOD PRESSURE: 136 MMHG | BODY MASS INDEX: 22.76 KG/M2 | OXYGEN SATURATION: 97 %

## 2018-11-27 DIAGNOSIS — E78.5 HYPERLIPIDEMIA, UNSPECIFIED HYPERLIPIDEMIA TYPE: Primary | ICD-10-CM

## 2018-11-27 DIAGNOSIS — Z00.00 ROUTINE GENERAL MEDICAL EXAMINATION AT A HEALTH CARE FACILITY: ICD-10-CM

## 2018-11-27 DIAGNOSIS — N39.0 RECURRENT UTI: ICD-10-CM

## 2018-11-27 DIAGNOSIS — Z23 NEED FOR PROPHYLACTIC VACCINATION AGAINST STREPTOCOCCUS PNEUMONIAE (PNEUMOCOCCUS): ICD-10-CM

## 2018-11-27 DIAGNOSIS — Z12.11 SCREEN FOR COLON CANCER: ICD-10-CM

## 2018-11-27 DIAGNOSIS — E03.9 ACQUIRED HYPOTHYROIDISM: ICD-10-CM

## 2018-11-27 PROCEDURE — 90732 PPSV23 VACC 2 YRS+ SUBQ/IM: CPT | Performed by: FAMILY MEDICINE

## 2018-11-27 PROCEDURE — 4040F PNEUMOC VAC/ADMIN/RCVD: CPT | Performed by: FAMILY MEDICINE

## 2018-11-27 PROCEDURE — G0009 ADMIN PNEUMOCOCCAL VACCINE: HCPCS | Performed by: FAMILY MEDICINE

## 2018-11-27 PROCEDURE — G8482 FLU IMMUNIZE ORDER/ADMIN: HCPCS | Performed by: FAMILY MEDICINE

## 2018-11-27 PROCEDURE — G0439 PPPS, SUBSEQ VISIT: HCPCS | Performed by: FAMILY MEDICINE

## 2018-11-27 RX ORDER — SULFAMETHOXAZOLE AND TRIMETHOPRIM 800; 160 MG/1; MG/1
1 TABLET ORAL 2 TIMES DAILY
Qty: 12 TABLET | Refills: 5 | Status: SHIPPED | OUTPATIENT
Start: 2018-11-27 | End: 2018-11-28 | Stop reason: SDUPTHER

## 2018-11-27 RX ORDER — LEVOTHYROXINE SODIUM 88 UG/1
88 TABLET ORAL DAILY
Qty: 90 TABLET | Refills: 3 | Status: SHIPPED | OUTPATIENT
Start: 2018-11-27 | End: 2018-11-27

## 2018-11-27 RX ORDER — PRAVASTATIN SODIUM 20 MG
20 TABLET ORAL EVERY EVENING
Qty: 30 TABLET | Refills: 3 | Status: SHIPPED | OUTPATIENT
Start: 2018-11-27 | End: 2019-01-07

## 2018-11-27 RX ORDER — LEVOTHYROXINE SODIUM 88 UG/1
88 TABLET ORAL DAILY
Qty: 90 TABLET | Refills: 3 | Status: SHIPPED | OUTPATIENT
Start: 2018-11-27 | End: 2019-12-02 | Stop reason: SDUPTHER

## 2018-11-27 RX ORDER — SULFAMETHOXAZOLE AND TRIMETHOPRIM 800; 160 MG/1; MG/1
1 TABLET ORAL 2 TIMES DAILY
Qty: 12 TABLET | Refills: 5 | Status: SHIPPED | OUTPATIENT
Start: 2018-11-27 | End: 2018-11-27

## 2018-11-27 ASSESSMENT — LIFESTYLE VARIABLES
AUDIT-C TOTAL SCORE: 3
HOW OFTEN DO YOU HAVE A DRINK CONTAINING ALCOHOL: 3
HOW OFTEN DO YOU HAVE SIX OR MORE DRINKS ON ONE OCCASION: 0
HOW MANY STANDARD DRINKS CONTAINING ALCOHOL DO YOU HAVE ON A TYPICAL DAY: 0

## 2018-11-27 ASSESSMENT — ANXIETY QUESTIONNAIRES: GAD7 TOTAL SCORE: 0

## 2018-11-27 ASSESSMENT — PATIENT HEALTH QUESTIONNAIRE - PHQ9
SUM OF ALL RESPONSES TO PHQ QUESTIONS 1-9: 0
SUM OF ALL RESPONSES TO PHQ QUESTIONS 1-9: 0

## 2018-11-27 NOTE — PATIENT INSTRUCTIONS
CoQ 10 100 mg twice daily     Personalized Preventive Plan for aNsh Beverly - 11/27/2018  Medicare offers a range of preventive health benefits. Some of the tests and screenings are paid in full while other may be subject to a deductible, co-insurance, and/or copay. Some of these benefits include a comprehensive review of your medical history including lifestyle, illnesses that may run in your family, and various assessments and screenings as appropriate. After reviewing your medical record and screening and assessments performed today your provider may have ordered immunizations, labs, imaging, and/or referrals for you. A list of these orders (if applicable) as well as your Preventive Care list are included within your After Visit Summary for your review. Other Preventive Recommendations:    · A preventive eye exam performed by an eye specialist is recommended every 1-2 years to screen for glaucoma; cataracts, macular degeneration, and other eye disorders. · A preventive dental visit is recommended every 6 months. · Try to get at least 150 minutes of exercise per week or 10,000 steps per day on a pedometer . · Order or download the FREE \"Exercise & Physical Activity: Your Everyday Guide\" from [employer]. Call 2-798.539.9047 or search [employer] online. · You need 9231-4214 mg of calcium and 0138-9923 IU of vitamin D per day. It is possible to meet your calcium requirement with diet alone, but a vitamin D supplement is usually necessary to meet this goal.  · When exposed to the sun, use a sunscreen that protects against both UVA and UVB radiation with an SPF of 30 or greater. Reapply every 2 to 3 hours or after sweating, drying off with a towel, or swimming. · Always wear a seat belt when traveling in a car. Always wear a helmet when riding a bicycle or motorcycle.

## 2018-11-28 RX ORDER — SULFAMETHOXAZOLE AND TRIMETHOPRIM 800; 160 MG/1; MG/1
1 TABLET ORAL 2 TIMES DAILY
Qty: 12 TABLET | Refills: 5 | OUTPATIENT
Start: 2018-11-28 | End: 2018-12-04

## 2018-12-17 ENCOUNTER — TELEPHONE (OUTPATIENT)
Dept: INTERNAL MEDICINE | Age: 68
End: 2018-12-17
Payer: MEDICARE

## 2018-12-17 DIAGNOSIS — Z12.11 COLON CANCER SCREENING: Primary | ICD-10-CM

## 2018-12-17 LAB
CONTROL: NORMAL
HEMOCCULT STL QL: NORMAL

## 2018-12-17 PROCEDURE — 82274 ASSAY TEST FOR BLOOD FECAL: CPT | Performed by: FAMILY MEDICINE

## 2019-01-07 RX ORDER — SIMVASTATIN 20 MG
20 TABLET ORAL NIGHTLY
Qty: 30 TABLET | Refills: 0 | Status: SHIPPED | OUTPATIENT
Start: 2019-01-07 | End: 2019-01-29 | Stop reason: SDUPTHER

## 2019-01-29 RX ORDER — SIMVASTATIN 20 MG
20 TABLET ORAL NIGHTLY
Qty: 90 TABLET | Refills: 2 | Status: SHIPPED | OUTPATIENT
Start: 2019-01-29 | End: 2019-05-06 | Stop reason: SDUPTHER

## 2019-03-04 DIAGNOSIS — E78.5 HYPERLIPIDEMIA, UNSPECIFIED HYPERLIPIDEMIA TYPE: ICD-10-CM

## 2019-03-04 LAB
ALBUMIN SERPL-MCNC: 4.6 G/DL (ref 3.5–4.6)
ALP BLD-CCNC: 58 U/L (ref 40–130)
ALT SERPL-CCNC: <5 U/L (ref 0–33)
ANION GAP SERPL CALCULATED.3IONS-SCNC: 12 MEQ/L (ref 9–15)
AST SERPL-CCNC: 21 U/L (ref 0–35)
BILIRUB SERPL-MCNC: 0.4 MG/DL (ref 0.2–0.7)
BUN BLDV-MCNC: 18 MG/DL (ref 8–23)
CALCIUM SERPL-MCNC: 9.3 MG/DL (ref 8.5–9.9)
CHLORIDE BLD-SCNC: 103 MEQ/L (ref 95–107)
CHOLESTEROL, TOTAL: 167 MG/DL (ref 0–199)
CO2: 27 MEQ/L (ref 20–31)
CREAT SERPL-MCNC: 0.66 MG/DL (ref 0.5–0.9)
GFR AFRICAN AMERICAN: >60
GFR NON-AFRICAN AMERICAN: >60
GLOBULIN: 2.2 G/DL (ref 2.3–3.5)
GLUCOSE BLD-MCNC: 98 MG/DL (ref 70–99)
HDLC SERPL-MCNC: 48 MG/DL (ref 40–59)
LDL CHOLESTEROL CALCULATED: 102 MG/DL (ref 0–129)
POTASSIUM SERPL-SCNC: 5 MEQ/L (ref 3.4–4.9)
SODIUM BLD-SCNC: 142 MEQ/L (ref 135–144)
TOTAL PROTEIN: 6.8 G/DL (ref 6.3–8)
TRIGL SERPL-MCNC: 85 MG/DL (ref 0–150)

## 2019-03-07 DIAGNOSIS — N30.90 CYSTITIS: Primary | ICD-10-CM

## 2019-03-07 PROCEDURE — 81002 URINALYSIS NONAUTO W/O SCOPE: CPT | Performed by: FAMILY MEDICINE

## 2019-04-22 ENCOUNTER — TELEPHONE (OUTPATIENT)
Dept: INTERNAL MEDICINE | Age: 69
End: 2019-04-22

## 2019-04-22 DIAGNOSIS — R30.0 DYSURIA: ICD-10-CM

## 2019-04-22 DIAGNOSIS — R30.0 DYSURIA: Primary | ICD-10-CM

## 2019-04-22 LAB
BILIRUBIN, POC: NORMAL
BLOOD URINE, POC: NORMAL
CLARITY, POC: CLEAR
COLOR, POC: YELLOW
GLUCOSE URINE, POC: NORMAL
KETONES, POC: NORMAL
LEUKOCYTE EST, POC: NORMAL
NITRITE, POC: NORMAL
PH, POC: 6
PROTEIN, POC: NORMAL
SPECIFIC GRAVITY, POC: 1.01
UROBILINOGEN, POC: NORMAL

## 2019-04-22 NOTE — TELEPHONE ENCOUNTER
Patient advised an states she has burning and some odd color urine. Advised if symptoms are bad go ahead and start the bactrim other wise we are sending a culture.

## 2019-04-24 LAB — URINE CULTURE, ROUTINE: NORMAL

## 2019-04-25 ENCOUNTER — TELEPHONE (OUTPATIENT)
Dept: ADMINISTRATIVE | Age: 69
End: 2019-04-25

## 2019-05-06 NOTE — TELEPHONE ENCOUNTER
Requesting medication refill. Please approve or deny this request.    Rx requested:  Requested Prescriptions     Pending Prescriptions Disp Refills    simvastatin (ZOCOR) 20 MG tablet 90 tablet 3     Sig: Take 1 tablet by mouth nightly       Last Office Visit:   11/27/2018    Last Labs:  3/4/19    Next Visit Date:  No future appointments.

## 2019-05-07 RX ORDER — SIMVASTATIN 20 MG
20 TABLET ORAL NIGHTLY
Qty: 90 TABLET | Refills: 3 | Status: SHIPPED | OUTPATIENT
Start: 2019-05-07 | End: 2019-05-09

## 2019-05-09 ENCOUNTER — OFFICE VISIT (OUTPATIENT)
Dept: INTERNAL MEDICINE | Age: 69
End: 2019-05-09
Payer: MEDICARE

## 2019-05-09 VITALS
BODY MASS INDEX: 21.63 KG/M2 | WEIGHT: 130 LBS | OXYGEN SATURATION: 97 % | HEART RATE: 98 BPM | DIASTOLIC BLOOD PRESSURE: 84 MMHG | SYSTOLIC BLOOD PRESSURE: 136 MMHG

## 2019-05-09 DIAGNOSIS — N39.0 RECURRENT UTI: ICD-10-CM

## 2019-05-09 DIAGNOSIS — F43.23 ADJUSTMENT DISORDER WITH MIXED ANXIETY AND DEPRESSED MOOD: Primary | ICD-10-CM

## 2019-05-09 LAB
BILIRUBIN, POC: ABNORMAL
BLOOD URINE, POC: ABNORMAL
CLARITY, POC: CLEAR
COLOR, POC: ABNORMAL
GLUCOSE URINE, POC: ABNORMAL
KETONES, POC: ABNORMAL
LEUKOCYTE EST, POC: ABNORMAL
NITRITE, POC: ABNORMAL
PH, POC: 7
PROTEIN, POC: ABNORMAL
SPECIFIC GRAVITY, POC: 1.01
UROBILINOGEN, POC: ABNORMAL

## 2019-05-09 PROCEDURE — 1090F PRES/ABSN URINE INCON ASSESS: CPT | Performed by: FAMILY MEDICINE

## 2019-05-09 PROCEDURE — G8400 PT W/DXA NO RESULTS DOC: HCPCS | Performed by: FAMILY MEDICINE

## 2019-05-09 PROCEDURE — 1036F TOBACCO NON-USER: CPT | Performed by: FAMILY MEDICINE

## 2019-05-09 PROCEDURE — 3017F COLORECTAL CA SCREEN DOC REV: CPT | Performed by: FAMILY MEDICINE

## 2019-05-09 PROCEDURE — 81002 URINALYSIS NONAUTO W/O SCOPE: CPT | Performed by: FAMILY MEDICINE

## 2019-05-09 PROCEDURE — G8427 DOCREV CUR MEDS BY ELIG CLIN: HCPCS | Performed by: FAMILY MEDICINE

## 2019-05-09 PROCEDURE — 1123F ACP DISCUSS/DSCN MKR DOCD: CPT | Performed by: FAMILY MEDICINE

## 2019-05-09 PROCEDURE — G8420 CALC BMI NORM PARAMETERS: HCPCS | Performed by: FAMILY MEDICINE

## 2019-05-09 PROCEDURE — 4040F PNEUMOC VAC/ADMIN/RCVD: CPT | Performed by: FAMILY MEDICINE

## 2019-05-09 PROCEDURE — 99214 OFFICE O/P EST MOD 30 MIN: CPT | Performed by: FAMILY MEDICINE

## 2019-05-09 RX ORDER — NITROFURANTOIN 25; 75 MG/1; MG/1
100 CAPSULE ORAL
Qty: 30 CAPSULE | Refills: 1 | Status: SHIPPED | OUTPATIENT
Start: 2019-05-09 | End: 2019-05-09

## 2019-05-09 RX ORDER — SERTRALINE HYDROCHLORIDE 25 MG/1
25 TABLET, FILM COATED ORAL DAILY
Qty: 30 TABLET | Refills: 3 | Status: SHIPPED | OUTPATIENT
Start: 2019-05-09 | End: 2019-05-17 | Stop reason: SINTOL

## 2019-05-09 ASSESSMENT — PATIENT HEALTH QUESTIONNAIRE - PHQ9
SUM OF ALL RESPONSES TO PHQ9 QUESTIONS 1 & 2: 0
2. FEELING DOWN, DEPRESSED OR HOPELESS: 0
1. LITTLE INTEREST OR PLEASURE IN DOING THINGS: 0
SUM OF ALL RESPONSES TO PHQ QUESTIONS 1-9: 0
SUM OF ALL RESPONSES TO PHQ QUESTIONS 1-9: 0

## 2019-05-09 NOTE — PATIENT INSTRUCTIONS
Please start taking over the counter RED YEAST RICE at a dose of 2.4 grams/day. Ask your pharmacist about this.

## 2019-05-09 NOTE — PROGRESS NOTES
Relationship status: Not on file    Intimate partner violence:     Fear of current or ex partner: Not on file     Emotionally abused: Not on file     Physically abused: Not on file     Forced sexual activity: Not on file   Other Topics Concern    Not on file   Social History Narrative    Not on file     Current Outpatient Medications on File Prior to Visit   Medication Sig Dispense Refill    levothyroxine (LEVOTHROID) 88 MCG tablet Take 1 tablet by mouth daily 90 tablet 3    Probiotic Product (PROBIOTIC DAILY PO) Take 1 tablet by mouth daily.  aspirin 81 MG tablet Take 81 mg by mouth daily.  Omega 3 1000 MG CAPS Take 1 Can by mouth daily.  ECHINACEA by Does not apply route.  therapeutic multivitamin-minerals (THERAGRAN-M) tablet Take 1 tablet by mouth daily.  Misc Natural Products (LUTEIN 20 PO) Take  by mouth.  BIOTIN FORTE PO Take  by mouth.  Vitamin D (CHOLECALCIFEROL) 1000 UNITS CAPS capsule Take 1,000 Units by mouth daily.  fluticasone (FLONASE) 50 MCG/ACT nasal spray 1 spray by Nasal route daily (Patient taking differently: 1 spray by Nasal route as needed ) 3 Bottle 3     No current facility-administered medications on file prior to visit. No Known Allergies    Chief Complaint   Patient presents with    Anxiety     mom passed away in Feb. would like to discusss something to help.  Discuss Medications     pt feels simvastatin is causing urinary leakage. has stopped taking since last friday. HPI    Patient is very upset  She just lost her mother  She is extremely anxious    Recently by GYN, had work up  Urine culture grew strep pyogenes   She completed Abx of amoxicillin     Review of Systems  Constitutional: Negative for fatigue, fever and sweats. HEENT: Negative for eye discharge and vision loss. Negative for ear drainage, hearing loss and nasal drainage. Respiratory: Negative for cough, dyspnea and wheezing.   Cardiovascular:  Negative for chest pain, claudication and irregular heartbeat/palpitations. Physical Exam  Vitals:    05/09/19 1425   BP: 136/84   Pulse: 98   SpO2: 97%   Body mass index is 21.63 kg/m². Physical Exam  Constitutional:  Appears well-developed and well-nourished. No distress. HENT:   Head: Normocephalic and atraumatic. Right Ear: External ear normal.   Left Ear: External ear normal.   Nose: Nose normal.   Eyes: Conjunctivae and EOM are normal.  Right eye exhibits no discharge. Left eye exhibits no discharge. No scleral icterus. Neck: Normal range of motion. Cardiovascular: Normal rate, regular rhythm and normal heart sounds. No murmur heard. Pulmonary/Chest: Effort normal and breath sounds normal. No respiratory distress. no wheezes. no rales. no tenderness. Musculoskeletal: Normal range of motion. Neurological: alert. Skin: not diaphoretic. Psychiatric: anxious, sad  Nursing note and vitals reviewed. Assessment:  Cynthia Richard was seen today for anxiety and discuss medications. Diagnoses and all orders for this visit:    Adjustment disorder with mixed anxiety and depressed mood  -     Discontinue: sertraline (ZOLOFT) 25 MG tablet; Take 1 tablet by mouth daily  -     Jeffrey Terrell, PhD, Psychology, [facility]  hand out provided, had a lengthy discussion with patient about treatment, it's side effects, the diagnosis & etiology of symptoms, along with management and expectations of outcome with the recommended treatment. Patient verbalized understanding. Recurrent UTI  -     nitrofurantoin, macrocrystal-monohydrate, (MACROBID) 100 MG capsule; Take 1 capsule by mouth once as needed (postcoital for UTI prophylaxis)  -     POCT Urinalysis no Micro  -     Cancel: Urine Culture;  Future  Recheck urine  Reassured pt  Discussed recurrent UTI's and risks      Total visit time >25 mins, more than 50% time spent on counseling, treatment, side effects, and follow up    Kami Browne MD

## 2019-05-11 LAB — URINE CULTURE, ROUTINE: NORMAL

## 2019-05-13 ENCOUNTER — TELEPHONE (OUTPATIENT)
Dept: INTERNAL MEDICINE | Age: 69
End: 2019-05-13

## 2019-05-13 ENCOUNTER — HOSPITAL ENCOUNTER (EMERGENCY)
Age: 69
Discharge: HOME | End: 2019-05-13
Attending: EMERGENCY MEDICINE
Payer: MEDICARE

## 2019-05-13 VITALS
OXYGEN SATURATION: 97 % | RESPIRATION RATE: 20 BRPM | HEART RATE: 82 BPM | SYSTOLIC BLOOD PRESSURE: 136 MMHG | WEIGHT: 135 LBS | BODY MASS INDEX: 21.69 KG/M2 | HEIGHT: 66 IN | DIASTOLIC BLOOD PRESSURE: 75 MMHG | TEMPERATURE: 97.8 F

## 2019-05-13 DIAGNOSIS — E86.0 DEHYDRATION: Primary | ICD-10-CM

## 2019-05-13 DIAGNOSIS — R19.7 DIARRHEA, UNSPECIFIED TYPE: ICD-10-CM

## 2019-05-13 DIAGNOSIS — F41.1 ANXIETY STATE: ICD-10-CM

## 2019-05-13 LAB
ALBUMIN SERPL-MCNC: 5.3 G/DL (ref 3.5–4.6)
ALP BLD-CCNC: 75 U/L (ref 40–130)
ALT SERPL-CCNC: 14 U/L (ref 0–33)
ANION GAP SERPL CALCULATED.3IONS-SCNC: 15 MEQ/L (ref 9–15)
AST SERPL-CCNC: 19 U/L (ref 0–35)
BACTERIA: NORMAL /HPF
BASOPHILS ABSOLUTE: 0 K/UL (ref 0–0.2)
BASOPHILS RELATIVE PERCENT: 0.5 %
BILIRUB SERPL-MCNC: 0.3 MG/DL (ref 0.2–0.7)
BILIRUBIN URINE: NEGATIVE
BLOOD, URINE: NORMAL
BUN BLDV-MCNC: 7 MG/DL (ref 8–23)
CALCIUM SERPL-MCNC: 10.6 MG/DL (ref 8.5–9.9)
CHLORIDE BLD-SCNC: 99 MEQ/L (ref 95–107)
CLARITY: CLEAR
CO2: 29 MEQ/L (ref 20–31)
COLOR: YELLOW
CREAT SERPL-MCNC: 0.62 MG/DL (ref 0.5–0.9)
EOSINOPHILS ABSOLUTE: 0 K/UL (ref 0–0.7)
EOSINOPHILS RELATIVE PERCENT: 0.4 %
EPITHELIAL CELLS, UA: NORMAL /HPF
GFR AFRICAN AMERICAN: >60
GFR NON-AFRICAN AMERICAN: >60
GLOBULIN: 3.2 G/DL (ref 2.3–3.5)
GLUCOSE BLD-MCNC: 121 MG/DL (ref 70–99)
GLUCOSE URINE: NEGATIVE MG/DL
HCT VFR BLD CALC: 48 % (ref 37–47)
HEMOGLOBIN: 15.7 G/DL (ref 12–16)
KETONES, URINE: NEGATIVE MG/DL
LEUKOCYTE ESTERASE, URINE: NORMAL
LYMPHOCYTES ABSOLUTE: 0.9 K/UL (ref 1–4.8)
LYMPHOCYTES RELATIVE PERCENT: 13.8 %
MCH RBC QN AUTO: 29.8 PG (ref 27–31.3)
MCHC RBC AUTO-ENTMCNC: 32.7 % (ref 33–37)
MCV RBC AUTO: 91.1 FL (ref 82–100)
MONOCYTES ABSOLUTE: 0.6 K/UL (ref 0.2–0.8)
MONOCYTES RELATIVE PERCENT: 9.1 %
NEUTROPHILS ABSOLUTE: 5.2 K/UL (ref 1.4–6.5)
NEUTROPHILS RELATIVE PERCENT: 76.2 %
NITRITE, URINE: NEGATIVE
PDW BLD-RTO: 14.2 % (ref 11.5–14.5)
PH UA: 7 (ref 5–9)
PLATELET # BLD: 286 K/UL (ref 130–400)
POTASSIUM SERPL-SCNC: 4 MEQ/L (ref 3.4–4.9)
PROTEIN UA: NEGATIVE MG/DL
RBC # BLD: 5.27 M/UL (ref 4.2–5.4)
RBC UA: NORMAL /HPF (ref 0–2)
SEDIMENTATION RATE, ERYTHROCYTE: 7 MM (ref 0–30)
SODIUM BLD-SCNC: 143 MEQ/L (ref 135–144)
SPECIFIC GRAVITY UA: 1.01 (ref 1–1.03)
TOTAL PROTEIN: 8.5 G/DL (ref 6.3–8)
URINE REFLEX TO CULTURE: YES
UROBILINOGEN, URINE: 0.2 E.U./DL
WBC # BLD: 6.8 K/UL (ref 4.8–10.8)
WBC UA: NORMAL /HPF (ref 0–5)

## 2019-05-13 PROCEDURE — 6370000000 HC RX 637 (ALT 250 FOR IP): Performed by: EMERGENCY MEDICINE

## 2019-05-13 PROCEDURE — 96374 THER/PROPH/DIAG INJ IV PUSH: CPT

## 2019-05-13 PROCEDURE — 6360000002 HC RX W HCPCS: Performed by: EMERGENCY MEDICINE

## 2019-05-13 PROCEDURE — 96361 HYDRATE IV INFUSION ADD-ON: CPT

## 2019-05-13 PROCEDURE — 80053 COMPREHEN METABOLIC PANEL: CPT

## 2019-05-13 PROCEDURE — 85652 RBC SED RATE AUTOMATED: CPT

## 2019-05-13 PROCEDURE — 81001 URINALYSIS AUTO W/SCOPE: CPT

## 2019-05-13 PROCEDURE — 36415 COLL VENOUS BLD VENIPUNCTURE: CPT

## 2019-05-13 PROCEDURE — 87086 URINE CULTURE/COLONY COUNT: CPT

## 2019-05-13 PROCEDURE — 85025 COMPLETE CBC W/AUTO DIFF WBC: CPT

## 2019-05-13 PROCEDURE — 99283 EMERGENCY DEPT VISIT LOW MDM: CPT

## 2019-05-13 PROCEDURE — 2580000003 HC RX 258: Performed by: EMERGENCY MEDICINE

## 2019-05-13 RX ORDER — DIPHENOXYLATE HYDROCHLORIDE AND ATROPINE SULFATE 2.5; .025 MG/1; MG/1
1 TABLET ORAL ONCE
Status: COMPLETED | OUTPATIENT
Start: 2019-05-13 | End: 2019-05-13

## 2019-05-13 RX ORDER — ALPRAZOLAM 0.25 MG/1
0.5 TABLET ORAL ONCE
Status: COMPLETED | OUTPATIENT
Start: 2019-05-13 | End: 2019-05-13

## 2019-05-13 RX ORDER — SODIUM CHLORIDE 0.9 % (FLUSH) 0.9 %
3 SYRINGE (ML) INJECTION EVERY 8 HOURS
Status: DISCONTINUED | OUTPATIENT
Start: 2019-05-13 | End: 2019-05-13 | Stop reason: HOSPADM

## 2019-05-13 RX ORDER — ALPRAZOLAM 0.5 MG/1
0.5 TABLET ORAL 2 TIMES DAILY PRN
Qty: 20 TABLET | Refills: 0 | Status: SHIPPED | OUTPATIENT
Start: 2019-05-13 | End: 2019-05-22 | Stop reason: SDUPTHER

## 2019-05-13 RX ORDER — 0.9 % SODIUM CHLORIDE 0.9 %
1000 INTRAVENOUS SOLUTION INTRAVENOUS ONCE
Status: COMPLETED | OUTPATIENT
Start: 2019-05-13 | End: 2019-05-13

## 2019-05-13 RX ORDER — ONDANSETRON 2 MG/ML
4 INJECTION INTRAMUSCULAR; INTRAVENOUS ONCE
Status: COMPLETED | OUTPATIENT
Start: 2019-05-13 | End: 2019-05-13

## 2019-05-13 RX ORDER — DIPHENOXYLATE HYDROCHLORIDE AND ATROPINE SULFATE 2.5; .025 MG/1; MG/1
1 TABLET ORAL 4 TIMES DAILY PRN
Qty: 20 TABLET | Refills: 0 | Status: SHIPPED | OUTPATIENT
Start: 2019-05-13 | End: 2019-05-16

## 2019-05-13 RX ORDER — ALPRAZOLAM 0.25 MG/1
0.5 TABLET ORAL NIGHTLY PRN
Status: DISCONTINUED | OUTPATIENT
Start: 2019-05-13 | End: 2019-05-13

## 2019-05-13 RX ORDER — ONDANSETRON 4 MG/1
4 TABLET, ORALLY DISINTEGRATING ORAL EVERY 8 HOURS PRN
Qty: 15 TABLET | Refills: 0 | OUTPATIENT
Start: 2019-05-13 | End: 2019-08-29

## 2019-05-13 RX ORDER — CIPROFLOXACIN 500 MG/1
500 TABLET, FILM COATED ORAL 2 TIMES DAILY
Qty: 6 TABLET | Refills: 0 | Status: SHIPPED | OUTPATIENT
Start: 2019-05-13 | End: 2019-05-16

## 2019-05-13 RX ADMIN — Medication 3 ML: at 14:04

## 2019-05-13 RX ADMIN — ONDANSETRON 4 MG: 2 INJECTION INTRAMUSCULAR; INTRAVENOUS at 14:03

## 2019-05-13 RX ADMIN — SODIUM CHLORIDE 1000 ML: 9 INJECTION, SOLUTION INTRAVENOUS at 14:03

## 2019-05-13 RX ADMIN — DIPHENOXYLATE HYDROCHLORIDE AND ATROPINE SULFATE 1 TABLET: 2.5; .025 TABLET ORAL at 14:03

## 2019-05-13 RX ADMIN — ALPRAZOLAM 0.5 MG: 0.25 TABLET ORAL at 15:03

## 2019-05-13 ASSESSMENT — ENCOUNTER SYMPTOMS
DIARRHEA: 1
EYE DISCHARGE: 0
SHORTNESS OF BREATH: 0
STRIDOR: 0
BLOOD IN STOOL: 0
ABDOMINAL PAIN: 1
CHOKING: 0
SORE THROAT: 0
CHEST TIGHTNESS: 0
TROUBLE SWALLOWING: 0
WHEEZING: 0
FACIAL SWELLING: 0
SINUS PRESSURE: 0
BACK PAIN: 0
EYE PAIN: 0
COUGH: 0
NAUSEA: 1
VOMITING: 0
VOICE CHANGE: 0
CONSTIPATION: 0
EYE REDNESS: 0

## 2019-05-13 NOTE — ED NOTES
Assessment note: Pt presents to the ED with diarrhea and nausea x 1 week. She states that she has had \"overwhelming anxiety\" since being diagnosed with a UTI 2 weeks ago. She is alert and oriented x 4. States that her appetite has been decreased. She states that she stopped taking her Zoloft that her primary care physician had prescribed after only 2 days of therapy because \"it didn't work\". Here with .       Renetta Thayer RN  05/13/19 3666

## 2019-05-13 NOTE — ED PROVIDER NOTES
[facility] ED  eMERGENCY dEPARTMENT eNCOUnter      Pt Name: Altaf Nieto  MRN: [id number]  [place] 1950  Date of evaluation: 5/13/2019  Provider: Padmini Blood MD    [address] [place] [address]       Chief Complaint   Patient presents with    Nausea    Diarrhea     approximately one week    Anxiety         HISTORY OF PRESENT ILLNESS   (Location/Symptom, Timing/Onset,Context/Setting, Quality, Duration, Modifying Factors, Severity)  Note limiting factors. Altaf Nieto is a 79 y.o. female who presents to the emergency department patient with history of anxiety hyperlipidemia hypothyroidism has history of recurrent UTI patient finished antibiotics 2 days ago amoxicillin for UTI was seen at [facility], patient is suffering with the diarrhea and anxiety together and has frequent loose watery stool along with anxiety given Zoloft by her doctors which did not help so she stopped taking it denied drinking too much caffeine no recent traveling no 1 else sick at home has not eaten very well for the last few days because of the chronic nausea and ongoing loose stool theHPI, as per patient she is tired ,weak and  Fatigue, so here for hydration and further evaluation    NursingNotes were reviewed. REVIEW OF SYSTEMS    (2-9 systems for level 4, 10 or more for level 5)     Review of Systems   Constitutional: Positive for activity change and fatigue. Negative for fever. HENT: Negative for congestion, drooling, facial swelling, mouth sores, nosebleeds, sinus pressure, sore throat, trouble swallowing and voice change. Eyes: Negative for pain, discharge, redness and visual disturbance. Respiratory: Negative for cough, choking, chest tightness, shortness of breath, wheezing and stridor. Cardiovascular: Negative for chest pain, palpitations and leg swelling. Gastrointestinal: Positive for abdominal pain, diarrhea and nausea. Negative for blood in stool, constipation and vomiting.    Endocrine: Negative tablet Take 81 mg by mouth daily. Omega 3 1000 MG CAPS Take 1 Can by mouth daily. ECHINACEA by Does not apply route. therapeutic multivitamin-minerals (THERAGRAN-M) tablet Take 1 tablet by mouth daily. Misc Natural Products (LUTEIN 20 PO) Take  by mouth. BIOTIN FORTE PO Take  by mouth. Vitamin D (CHOLECALCIFEROL) 1000 UNITS CAPS capsule Take 1,000 Units by mouth daily. ALLERGIES     Patient has no known allergies. FAMILY HISTORY       Family History   Problem Relation Age of Onset    High Cholesterol Mother     High Cholesterol Father           SOCIAL HISTORY       Social History     Socioeconomic History    Marital status:      Spouse name: None    Number of children: None    Years of education: None    Highest education level: None   Occupational History    None   Social Needs    Financial resource strain: None    Food insecurity:     Worry: None     Inability: None    Transportation needs:     Medical: None     Non-medical: None   Tobacco Use    Smoking status: Never Smoker    Smokeless tobacco: Never Used   Substance and Sexual Activity    Alcohol use:  Yes     Alcohol/week: 0.0 oz     Comment: socially, moderately    Drug use: No    Sexual activity: Yes     Partners: Male   Lifestyle    Physical activity:     Days per week: None     Minutes per session: None    Stress: None   Relationships    Social connections:     Talks on phone: None     Gets together: None     Attends Rastafarian service: None     Active member of club or organization: None     Attends meetings of clubs or organizations: None     Relationship status: None    Intimate partner violence:     Fear of current or ex partner: None     Emotionally abused: None     Physically abused: None     Forced sexual activity: None   Other Topics Concern    None   Social History Narrative    None       SCREENINGS      @FLOW([id number])@      PHYSICAL EXAM    (up to 7 for level 4, 8 or more for none    LABS:  Labs Reviewed   COMPREHENSIVE METABOLIC PANEL - Abnormal; Notable for the following components:       Result Value    Glucose 121 (*)     BUN 7 (*)     Calcium 10.6 (*)     Total Protein 8.5 (*)     Alb 5.3 (*)     All other components within normal limits   CBC WITH AUTO DIFFERENTIAL - Abnormal; Notable for the following components:    Hematocrit 48.0 (*)     MCHC 32.7 (*)     Lymphocytes # 0.9 (*)     All other components within normal limits   GASTROINTESTINAL PANEL BY DNA   URINE CULTURE   URINE RT REFLEX TO CULTURE   SEDIMENTATION RATE   MICROSCOPIC URINALYSIS       All other labs were within normal range or not returned as of this dictation. EMERGENCY DEPARTMENT COURSE and DIFFERENTIAL DIAGNOSIS/MDM:   Vitals:    Vitals:    05/13/19 1332 05/13/19 1501   BP: (!) 157/80 136/75   Pulse: 112 82   Resp: 16 20   Temp: 97.8 °F (36.6 °C)    TempSrc: Oral    SpO2: 94% 97%   Weight: 135 lb (61.2 kg)    Height: 5' 6\" (1.676 m)            MDM    CRITICAL CARE TIME   Total Critical Care time was  minutes, excluding separately reportableprocedures. There was a high probability of clinicallysignificant/life threatening deterioration in the patient's condition which required my urgent intervention. CONSULTS:  None    PROCEDURES:  Unless otherwise noted below, none     Procedures    FINAL IMPRESSION      1. Dehydration    2. Diarrhea, unspecified type    3. Anxiety state          DISPOSITION/PLAN   DISPOSITION Decision To Discharge 05/13/2019 02:52:39 PM      PATIENT REFERRED TO:  May Livingston MD  [address] [place] [address] [phone]    In 3 days  If symptoms worsen      DISCHARGE MEDICATIONS:  Discharge Medication List as of 5/13/2019  2:59 PM      START taking these medications    Details   ALPRAZolam (XANAX) 0.5 MG tablet Take 1 tablet by mouth 2 times daily as needed for Sleep for up to 30 days. , Disp-20 tablet, R-0Print      diphenoxylate-atropine (LOMOTIL) 2.5-0.025 MG per tablet Take 1 tablet by mouth 4 times daily as needed for Diarrhea for up to 3 days. , Disp-20 tablet, R-0Print      ciprofloxacin (CIPRO) 500 MG tablet Take 1 tablet by mouth 2 times daily for 3 days, Disp-6 tablet, R-0Print                (Please note that portions of this note were completed with a voice recognition program.  Efforts were made to edit the dictations but occasionally words are mis-transcribed.)    Franki Barthel, MD (electronically signed)  Attending Emergency Physician       Franki Barthel, MD  05/13/19 0844

## 2019-05-13 NOTE — TELEPHONE ENCOUNTER
Patient calls, states she has nausea and diarrhea. Advised per verbal with dr Helga Morin advised to take OTC imodium and she will call in zofran for nausea.

## 2019-05-15 LAB — URINE CULTURE, ROUTINE: NORMAL

## 2019-05-16 ENCOUNTER — OFFICE VISIT (OUTPATIENT)
Dept: BEHAVIORAL/MENTAL HEALTH CLINIC | Age: 69
End: 2019-05-16
Payer: MEDICARE

## 2019-05-16 DIAGNOSIS — F43.21 GRIEF REACTION: ICD-10-CM

## 2019-05-16 DIAGNOSIS — F43.22 ADJUSTMENT DISORDER WITH ANXIETY: Primary | ICD-10-CM

## 2019-05-16 PROCEDURE — 1036F TOBACCO NON-USER: CPT | Performed by: PSYCHOLOGIST

## 2019-05-16 PROCEDURE — 90791 PSYCH DIAGNOSTIC EVALUATION: CPT | Performed by: PSYCHOLOGIST

## 2019-05-16 ASSESSMENT — PATIENT HEALTH QUESTIONNAIRE - PHQ9
1. LITTLE INTEREST OR PLEASURE IN DOING THINGS: 1
7. TROUBLE CONCENTRATING ON THINGS, SUCH AS READING THE NEWSPAPER OR WATCHING TELEVISION: 0
9. THOUGHTS THAT YOU WOULD BE BETTER OFF DEAD, OR OF HURTING YOURSELF: 0
5. POOR APPETITE OR OVEREATING: 3
4. FEELING TIRED OR HAVING LITTLE ENERGY: 1
3. TROUBLE FALLING OR STAYING ASLEEP: 0
8. MOVING OR SPEAKING SO SLOWLY THAT OTHER PEOPLE COULD HAVE NOTICED. OR THE OPPOSITE, BEING SO FIGETY OR RESTLESS THAT YOU HAVE BEEN MOVING AROUND A LOT MORE THAN USUAL: 0
6. FEELING BAD ABOUT YOURSELF - OR THAT YOU ARE A FAILURE OR HAVE LET YOURSELF OR YOUR FAMILY DOWN: 0
SUM OF ALL RESPONSES TO PHQ9 QUESTIONS 1 & 2: 3
SUM OF ALL RESPONSES TO PHQ QUESTIONS 1-9: 7
2. FEELING DOWN, DEPRESSED OR HOPELESS: 2
10. IF YOU CHECKED OFF ANY PROBLEMS, HOW DIFFICULT HAVE THESE PROBLEMS MADE IT FOR YOU TO DO YOUR WORK, TAKE CARE OF THINGS AT HOME, OR GET ALONG WITH OTHER PEOPLE: 1
SUM OF ALL RESPONSES TO PHQ QUESTIONS 1-9: 7

## 2019-05-16 NOTE — PROGRESS NOTES
Behavioral Health Consultation  Kiya Harrell, Ph.D., [facility]  Psychologist  5/16/19  8:34 AM      Time spent with Patient: 30 minutes  This is patient's first  [facility] appointment. Reason for Consult:  Anxiety and stress  Referring Provider: Anneliese Mccrary MD    Pt provided informed consent for the behavioral health program. Discussed with patient model of service to include the limits of confidentiality (i.e. abuse reporting, suicide intervention, etc.) and short-term intervention focused approach. Pt indicated understanding. Feedback given to PCP. S:    Pt  Reports no history of MH treatment noting herself to be a \"strong in charge kind of person\", describing her perspective of her current symptoms to be a weakness. Pt was referred by PCP after noting concerns that \"all started with my mom\" after her death in 2/19. Pt had helped care for her mother and her mother's functioning has always been a central focus on her ADLs, \"I was always stressed with her\". Since August of 2018 she entered a nursing home and pt has had a variety of adjustments since that time. Pt notes the impact of \"I watched her die\" and has difficulty with some of the memories of that experience. Pt notes \"my mom was my best friend my entire life\". Pt started a cholesterol medication and began to notice some changes with her ability to urinate and believes it was related to a side effect of that medication. Since then pt describes castraphizing health concerns since that time. \"I'm thinking I'm dying\" and they anxiety has continued to worsen, mostly  Health related. Pt lives with her spouse and describes a \"very loving\" relationship. Pt has been  52  Years. Pt has three children, all local and describes a positive relationship with them and her grandkids (4 and a great granddaughter). Pt is from [place] and has lived in this area for 40 years.  Pt is retired, had worked with her own fitness program for 20 yrs and then worked for [employer] and loved her work. Pt reports good friends in her life, enjoys exercise, biking, motorcycles \"are my passion\", [place], really \"I love life\". Pt is on social media. Pt is involved in [employer]. Pt just started Zoloft on Monday but stopped and notes she went to the ER recently for dehydration and anxiety. Pt is taking one xanax per day (has 20 pills) and notes it is helpful. Pt drinks etoh infrequently, rivas snot use thc or other substances. Pt denies SI/HI. O:  MSE:    Appearance    alert, cooperative  Appetite abnormal: decreased  Sleep disturbance No  Fatigue No  Loss of pleasure No  Impulsive behavior Yes  Speech    spontaneous, normal rate and normal volume  Mood    Anxious  Affect    anxiety  Thought Content    intact  Thought Process    coherent and tangential  Associations    logical connections, tangential connections  Insight    Fair  Judgment    Intact  Orientation    oriented to person, place, time, and general circumstances  Memory    recent and remote memory intact  Attention/Concentration    Impacted by tangential thinking  Morbid ideation No  Suicide Assessment    no suicidal ideation      History:    Medications:   Current Outpatient Medications   Medication Sig Dispense Refill    ondansetron (ZOFRAN-ODT) 4 MG disintegrating tablet Take 1 tablet by mouth every 8 hours as needed for Nausea or Vomiting 15 tablet 0    ALPRAZolam (XANAX) 0.5 MG tablet Take 1 tablet by mouth 2 times daily as needed for Sleep for up to 30 days. 20 tablet 0    diphenoxylate-atropine (LOMOTIL) 2.5-0.025 MG per tablet Take 1 tablet by mouth 4 times daily as needed for Diarrhea for up to 3 days.  20 tablet 0    ciprofloxacin (CIPRO) 500 MG tablet Take 1 tablet by mouth 2 times daily for 3 days 6 tablet 0    sertraline (ZOLOFT) 25 MG tablet Take 1 tablet by mouth daily 30 tablet 3    levothyroxine (LEVOTHROID) 88 MCG tablet Take 1 tablet by mouth daily 90 tablet 3    fluticasone Emotionally abused: Not on file     Physically abused: Not on file     Forced sexual activity: Not on file   Other Topics Concern    Not on file   Social History Narrative    Not on file       TOBACCO:   reports that she has never smoked. She has never used smokeless tobacco.  ETOH:   reports that she drinks alcohol. Family History:   Family History   Problem Relation Age of Onset    High Cholesterol Mother     High Cholesterol Father          A:  Administered the PHQ9 which indicates a self report of mild symptom distress. Pt describes difficulty managing stress and anxiety since her mother passed, in addition to the onset of several health concerns. Pt would benefit from [facility] services to increase coping skills to provide symptom management/control/relief. PHQ Scores 5/16/2019 5/9/2019 11/27/2018 11/16/2017 11/15/2016   PHQ2 Score 3 0 0 0 0   PHQ9 Score 7 0 0 0 0     Interpretation of Total Score Depression Severity: 1-4 = Minimal depression, 5-9 = Mild depression, 10-14 = Moderate depression, 15-19 = Moderately severe depression, 20-27 = Severe depression      Diagnosis:    Adjustment disorder with anxiety  Grief reaction      Diagnosis Date    Abnormal white blood cell (WBC) count 11/15/2016    Hiatal hernia     Hyperlipidemia     Hypothyroidism     Pap smear for cervical cancer screening 9/2011    normal    prophylaxis for Recurrent UTI 12/18/2013    Screening mammogram 9/2011    normal           Plan:  Pt interventions:  Established rapport, Conducted functional assessment, Ideal-setting to identify pt's primary goals for [facility] visit / overall health, Supportive techniques and Provided Psychoeducation re: [facility] services and stress management      Pt Behavioral Change Plan:    1. Please read below about some of what we are going to be able to do together.   2. Return in about a week    Please note this report has been partially produced using speech recognition software  And may cause contain

## 2019-05-16 NOTE — PATIENT INSTRUCTIONS
1. Please read below about some of what we are going to be able to do together.   2. Return in about a week

## 2019-05-17 ENCOUNTER — OFFICE VISIT (OUTPATIENT)
Dept: UROLOGY | Age: 69
End: 2019-05-17
Payer: MEDICARE

## 2019-05-17 VITALS
HEIGHT: 66 IN | BODY MASS INDEX: 20.89 KG/M2 | DIASTOLIC BLOOD PRESSURE: 80 MMHG | HEART RATE: 88 BPM | WEIGHT: 130 LBS | SYSTOLIC BLOOD PRESSURE: 124 MMHG

## 2019-05-17 DIAGNOSIS — R33.9 URINARY RETENTION: Primary | ICD-10-CM

## 2019-05-17 DIAGNOSIS — R39.198 DIFFICULTY URINATING: ICD-10-CM

## 2019-05-17 LAB
BILIRUBIN, POC: NORMAL
BLOOD URINE, POC: NORMAL
CLARITY, POC: CLEAR
COLOR, POC: YELLOW
GLUCOSE URINE, POC: NORMAL
KETONES, POC: NORMAL
LEUKOCYTE EST, POC: NORMAL
NITRITE, POC: NORMAL
PH, POC: 5.5
POST VOID RESIDUAL (PVR): 22 ML
PROTEIN, POC: NORMAL
SPECIFIC GRAVITY, POC: >=1.03
UROBILINOGEN, POC: 0.2

## 2019-05-17 PROCEDURE — 81003 URINALYSIS AUTO W/O SCOPE: CPT | Performed by: UROLOGY

## 2019-05-17 PROCEDURE — G8400 PT W/DXA NO RESULTS DOC: HCPCS | Performed by: UROLOGY

## 2019-05-17 PROCEDURE — 4040F PNEUMOC VAC/ADMIN/RCVD: CPT | Performed by: UROLOGY

## 2019-05-17 PROCEDURE — 99203 OFFICE O/P NEW LOW 30 MIN: CPT | Performed by: UROLOGY

## 2019-05-17 PROCEDURE — G8427 DOCREV CUR MEDS BY ELIG CLIN: HCPCS | Performed by: UROLOGY

## 2019-05-17 PROCEDURE — 51798 US URINE CAPACITY MEASURE: CPT | Performed by: UROLOGY

## 2019-05-17 PROCEDURE — 1123F ACP DISCUSS/DSCN MKR DOCD: CPT | Performed by: UROLOGY

## 2019-05-17 PROCEDURE — 3017F COLORECTAL CA SCREEN DOC REV: CPT | Performed by: UROLOGY

## 2019-05-17 PROCEDURE — 1036F TOBACCO NON-USER: CPT | Performed by: UROLOGY

## 2019-05-17 PROCEDURE — 1090F PRES/ABSN URINE INCON ASSESS: CPT | Performed by: UROLOGY

## 2019-05-17 PROCEDURE — G8420 CALC BMI NORM PARAMETERS: HCPCS | Performed by: UROLOGY

## 2019-05-17 RX ORDER — NITROFURANTOIN 25; 75 MG/1; MG/1
CAPSULE ORAL
Refills: 1 | COMMUNITY
Start: 2019-05-09 | End: 2019-12-02 | Stop reason: SDUPTHER

## 2019-05-17 NOTE — PROGRESS NOTES
insecurity:     Worry: None     Inability: None    Transportation needs:     Medical: None     Non-medical: None   Tobacco Use    Smoking status: Never Smoker    Smokeless tobacco: Never Used   Substance and Sexual Activity    Alcohol use: Yes     Alcohol/week: 0.0 oz     Comment: socially, moderately    Drug use: No    Sexual activity: Yes     Partners: Male   Lifestyle    Physical activity:     Days per week: None     Minutes per session: None    Stress: None   Relationships    Social connections:     Talks on phone: None     Gets together: None     Attends Uats[employer] service: None     Active member of club or organization: None     Attends meetings of clubs or organizations: None     Relationship status: None    Intimate partner violence:     Fear of current or ex partner: None     Emotionally abused: None     Physically abused: None     Forced sexual activity: None   Other Topics Concern    None   Social History Narrative    None     Family History   Problem Relation Age of Onset    High Cholesterol Mother     High Cholesterol Father      Current Outpatient Medications   Medication Sig Dispense Refill    ondansetron (ZOFRAN-ODT) 4 MG disintegrating tablet Take 1 tablet by mouth every 8 hours as needed for Nausea or Vomiting 15 tablet 0    ALPRAZolam (XANAX) 0.5 MG tablet Take 1 tablet by mouth 2 times daily as needed for Sleep for up to 30 days. 20 tablet 0    levothyroxine (LEVOTHROID) 88 MCG tablet Take 1 tablet by mouth daily 90 tablet 3    Probiotic Product (PROBIOTIC DAILY PO) Take 1 tablet by mouth daily.  aspirin 81 MG tablet Take 81 mg by mouth daily.  Omega 3 1000 MG CAPS Take 1 Can by mouth daily.  ECHINACEA by Does not apply route.  therapeutic multivitamin-minerals (THERAGRAN-M) tablet Take 1 tablet by mouth daily.  Misc Natural Products (LUTEIN 20 PO) Take  by mouth.  BIOTIN FORTE PO Take  by mouth.       Vitamin D (CHOLECALCIFEROL) 1000 UNITS CAPS

## 2019-05-21 ENCOUNTER — OFFICE VISIT (OUTPATIENT)
Dept: BEHAVIORAL/MENTAL HEALTH CLINIC | Age: 69
End: 2019-05-21
Payer: MEDICARE

## 2019-05-21 DIAGNOSIS — F43.22 ADJUSTMENT DISORDER WITH ANXIETY: Primary | ICD-10-CM

## 2019-05-21 DIAGNOSIS — F43.21 GRIEF REACTION: ICD-10-CM

## 2019-05-21 PROCEDURE — 90832 PSYTX W PT 30 MINUTES: CPT | Performed by: PSYCHOLOGIST

## 2019-05-21 PROCEDURE — 1036F TOBACCO NON-USER: CPT | Performed by: PSYCHOLOGIST

## 2019-05-21 ASSESSMENT — PATIENT HEALTH QUESTIONNAIRE - PHQ9
7. TROUBLE CONCENTRATING ON THINGS, SUCH AS READING THE NEWSPAPER OR WATCHING TELEVISION: 0
4. FEELING TIRED OR HAVING LITTLE ENERGY: 0
SUM OF ALL RESPONSES TO PHQ QUESTIONS 1-9: 2
5. POOR APPETITE OR OVEREATING: 1
6. FEELING BAD ABOUT YOURSELF - OR THAT YOU ARE A FAILURE OR HAVE LET YOURSELF OR YOUR FAMILY DOWN: 0
3. TROUBLE FALLING OR STAYING ASLEEP: 0
2. FEELING DOWN, DEPRESSED OR HOPELESS: 0
10. IF YOU CHECKED OFF ANY PROBLEMS, HOW DIFFICULT HAVE THESE PROBLEMS MADE IT FOR YOU TO DO YOUR WORK, TAKE CARE OF THINGS AT HOME, OR GET ALONG WITH OTHER PEOPLE: 0
9. THOUGHTS THAT YOU WOULD BE BETTER OFF DEAD, OR OF HURTING YOURSELF: 0
SUM OF ALL RESPONSES TO PHQ9 QUESTIONS 1 & 2: 0
SUM OF ALL RESPONSES TO PHQ QUESTIONS 1-9: 2
8. MOVING OR SPEAKING SO SLOWLY THAT OTHER PEOPLE COULD HAVE NOTICED. OR THE OPPOSITE, BEING SO FIGETY OR RESTLESS THAT YOU HAVE BEEN MOVING AROUND A LOT MORE THAN USUAL: 1
1. LITTLE INTEREST OR PLEASURE IN DOING THINGS: 0

## 2019-05-21 NOTE — PATIENT INSTRUCTIONS
1. Dedicate 5 minutes 3x/day to practice the deep breathing    2. Review the list of apps and give some a try! [facility] Box, CBTi  and progressive muscle relaxation for sleep, etc.)    3. Read the below information about stress management    4. Return in about 2 weeks    \"The entire autonomic nervous system (and through it, our internal organs and glands) is largely driven by our breathing patterns. By changing our breathing we can influence millions of biochemical reactions in our body, producing more relaxing substances such as endorphins and fewer anxiety-producing ones like adrenaline and higher blood acidity. Mindfulness of the breath is so effective that it is common to all meditative and prayer traditions. \" Anxiety Fear & Breathing - Breathing. com    \"When overcoming high levels of anxiety, it is important to learn the techniques of correct breathing. Many people who live with high levels of anxiety are known to breathe through their chest. Shallow breathing through the chest means you are disrupting the balance of oxygen and carbon dioxide necessary to be in a relaxed state. This type of breathing will perpetuate the symptoms of anxiety. \" [employer]. com      Diaphragmatic Breathing             _____________________________________________________________________________  1. Sit in a comfortable position    2. Place one hand on your stomach and the other on your chest    3. Try to breathe so that only your stomach rises and falls    As you inhale, concentrate on your chest remaining relatively still while your stomach rises. It may be helpful for you to imagine that your pants are too big and you need to push your stomach out to hold them up. When exhaling, allow your stomach to fall in and the air to fully escape. Inhale slowly. You may choose to hold the air in for about a second. Exhale slowly.   Dont push the air out, but just let the natural pressure of your body slowly move it out.    It is normal for this healthy method of breathing to feel a little awkward at first.  With practice, it will feel more natural.    4. Get your mind on your side    One other important factor in getting relaxed is your mind. Your mind and body are connected. The mind influences the body and the body influences the mind. What you do with your mind when you are trying to relax is very important. The key is to avoid thinking about stressful things. You can think about      Neutral things (e.g., counting, saying a word like calm or relax)   Pleasant things (e.g., imagining a pleasant place)    5. It is recommended that you practice 2 times per day, 10 minutes each time. ----------------------------------------------------------------------------------------------------------------------  ----------------------------------------------------------------------------------------------------------------------  ----------------------------------------------------------------------------------------------------------------------  ----------------------------------------------------------------------------------------------------------------------      CONTROLLED or MEASURED BREATHING EXERCISE: (YOU MAY WANT TO DOWNLOAD THE FREE [employer]AN [employer] \"VIRTUAL HOPE BOX\" TO PRACTICE THIS EXERCISE)    You can sit or stand, but be sure to soften up a little before you begin. Make sure your hands are relaxed, and your knees are soft. Drop your shoulders and let your jaw relax. Now breath in slowly through your nose and count to three, keep your shoulders down and allow your stomach to expand as you breathe in. Hold the breath for a moment. Now release your breath slowly and smoothly as you count to six. Repeat for a couple of minutes        It can be very helpful to use tools like relaxed breathing, muscle relaxation, and guided imagery/visualization to cope with stress, pain, anxiety and depression.  I recommend to all to be right now -- calm, motivated, and confident are among the options -- then let the audio hypnosis guide you through a session. Anti-Anxiety   Platform: Android  Cost: Free  You can tell the bandar your problems by taking a diagnostic quiz about your level of stress and anxiety. Using your answers, the bandar will design a custom treatment plan for you. Follow instructional self-help videos like How to Tolerate and Lessen Anxiety.  Keep a daily journal of your anxiety and worries, and track your progress as you learn to regain a sense of calm. Worry Box - Anxiety Self-Help  Platform: Android  Cost: Free  Have you ever wished you could put all your worries in a box, leave them there, and walk away? The Worry Box bandar may let you do just that. The bandar functions a lot like a journal: Write down your thoughts, anxieties, and worries, and let the bandar help you think them through. It will ask questions, give specific anxiety-reducing help, and can even direct you to help you reduce your worries and anxiety. It is all password protected, so you can feel safe sharing the details of your stresses. -----------------------------------------------  Relax Melodies  Platform: [employer] and [employer]  Cost: Free  Anxiety can disrupt healthy sleep patterns in more than one way. First, people who dont get enough sleep tend to feel more anxious. Then, people who are more anxious have a difficult time sleeping. Creating a calming environment may help you fall asleep and stay asleep. Relax to one of this bandars 50 sounds. Need the music to stop once youre asleep? Set a timer, and it will stop playing. Set an alarm when you need to be awake. Then, enjoy the benefits of a good nights sleep, free from anxiety. Relaxing Sounds of Nature - Lite  Platform: iPhone  Cost: Free  You can find rest and relaxation without having to travel.  The bandar comes with 35 nature tracks, which include soothing classics like crickets chirping, breaking waves, and a serene lake. You can download more free tracks to [employer], and customize a favorite combination that helps you reduce your anxiety in a peaceful setting. Allow the sounds of nature to sweep you away from your worries in the comfort of your living room, office, or bedroom. Nature Sounds Relax and Sleep  Platform: Android  Cost: Free  If you find yourself longing for the sound of the ocean to help you relax, the H[employer] and Sleep bandar is for you. Open this bandar whenever youre feeling anxious or stressed. You can select locations or sounds like the jungle, ocean, or thunder and slip away into a place of relaxation and comfort. If the sounds make you feel sleepy, even better. Use the bandar to doze off into a relaxing slumber  Calming Music to Simplicity  Platform: Android  Cost: Free  [employer] arent the only relaxing tunes in smartphone apps. Music, especially some traditional [place] music, can be relaxing and soothing. The Calming Music to Simplicity bandar contains nine traditional [place] music selections. Press play and let your worries melt away. Relax Ocean Waves Sleep by iLost  Platform: Android  Cost: Free  Living far from a beach doesnt mean you have to be far from total relaxation. Slip on a set of headphones and drift into a distant sand-and-suds oasis. Whether youre trying to head off an anxiety attack or just need to get some good sleep after a few anxious days, the Relax Ocean Waves Sleep bandar helps you find a place of serenity.  --------------------------------------------------------------  Catana Sensor Meditation Relaxation  Platform: Android  Cost: Free  Catana Sensor is a traditional [facility] health system that brings together posture, breathing, and the mind to reduce anxiety. This Android bandar connects users with a library of relaxation videos that contain instructions for relaxing and clearing the mind.  The videos are created by Dr. Javier Conn, a psychologist with more than 20 years of experience. In addition to viewing Dr. Cristi March videos, you can read a variety of articles related to anxiety, meditation, and stress management. Anxiety Free  Platform: Synchro   Cost: Free  Meditation requires mindfulness and a sense of presence in your thoughts. Hypnosis is one step beyond meditation. It works by sending signals to your brain and transforming it almost unknowingly. The creators of this bandar say that its audio recordings contain subliminal signals that speak to the subconscious with powerful effect.  Hypnosis, like meditation, requires practice, and the goal is to get each user to a place where self-hypnosis is possible in order to reduce anxiety. Relax & Rest Guided Meditations  Platform: Synchro and Psykosoft  Cost: $0.99  While group meetings and discussions are always an option, some people find relaxation more easily on their own. This bandar lets you relax in the space of your own home or office with three guided meditations. Breath Awareness Guided Meditation (5 minutes), Deep Rested Guided Meditation (13 minutes), and Whole Body Guided Relaxation (24 minutes) are designed specifically to help you relax and sink into a peaceful meditation moment. Equanimity - Meditation Timer & Tracker  Platform: Synchro   Cost: $4.99  Meditation is one way you can cope with the symptoms and side effects of anxiety. People who meditate can eventually train themselves to stay calm when feeling stressed or anxious. Equanimity - Meditation Timer & Tracker is simple and straightforward. Time each session and watch for visual light cues to let you know how long youve been meditating. Take notes about each session, and watch your progress as you learn to manage your stress and anxiety.   Virtual Hope Box  Platform: Synchro and Psykosoft  Cost: Free  The Automatic Data Box (VHB) is a smartphone application that contains simple tools to help with coping, relaxation, distraction, and positive thinking via personalized supportive audio, video, pictures, games, mindfulness exercises, positive messages and activity planning, inspirational quotes, coping statements, and other tools. Acupressure: Heal Yourself  Platform: iPhone and Android  Cost: $1.99  Acupressure is a natural healing strategy in which you target specific areas of the body in order to alleviate pain or unwanted symptoms. Acupressure can also increase blood flow, which can boost your mood and your health. This bandar helps you find your bodys acupressure points. Apply pressure on those points when youre feeling overwhelmed, and receive the positive, calming benefit  PTSD : Self-Management of Posttraumatic Stress  Platform: iPhone and Android  Cost: Free  This bandar can help you learn about and manage symptoms that often occur after trauma. Provides information and coping skills for common kinds of posttraumatic stress symptoms and problems, including systematic relaxation and self-help techniques. [place]: Feel better and live happier today  Platform: iPhone  Cost: Free  Daily mood and health tracking as well as journaling. Activities are based on mindfulness, relaxation and Cognitive Behavioral Therapy. Online support, networking and emails. CBT-i : Cognitive Behavioral Therapy for Insomnia  Platform: iPhone  Cost: Free  Identify sleep patterns with a sleep diary and assessment, tools to create new sleep habits, quiet your mind and prevent insomnia in the future. You can set reminders and learn about healthy sleep habits. Mindfulness   Platform: iPhone  Cost: Free  Mindfulness practice to decrease stress, manage emotional discomfort, depression, physical pain, and other problems. It offers exercises, information, and a tracking log to that you can optimize practice.    Mindsail  Platform: iPhone  Cost: Free for the first month then $5.99/month for full access  [employer] is a platform to discover original content from top thought-leaders and experts across relationship, career, anxiety, sleep, addiction and more. Their proprietary Programs and Moodboosts help users gain new perspectives and tools to change behaviors and live life in forward motion. [facility] features:  Programs   Created exclusively for [facility], programs are designed to give users unique insights and tools to feed their appetite for personal growth. Users can listen to bite-sized Coaching sessions and learn new tools such as meditations, breathing exercises, visualizations and affirmations. By offering a holistic, multi-faceted approach, users will be able to reach peak mental performance and gain a deeper understanding of their physical, emotional and spiritual well-being. Moodboost   On a daily basis, people experience a wide range of emotions. Whether they're nervous about a work meeting, having trouble sleeping, or need an extra boost of confidence before their date, [facility]'s quick daily Moodboosts can help turn a user's negative thoughts into a positive state of mind. [employer]   Users can track progress and star their favorite sessions and Moodboosts to help them stay on course of their personal growth journey. Users can create a daily mental wellness routine to help them form healthier habits and change behaviors. Ask the Experts   Users can submit personal questions to be answered by "[employer]il experts and see those posed by the community. They will also get Smooth Sailing tips and Words of [place] to help users navigate their day.

## 2019-05-21 NOTE — PROGRESS NOTES
Bottle 3    Probiotic Product (PROBIOTIC DAILY PO) Take 1 tablet by mouth daily.  aspirin 81 MG tablet Take 81 mg by mouth daily.  Omega 3 1000 MG CAPS Take 1 Can by mouth daily.  ECHINACEA by Does not apply route.  therapeutic multivitamin-minerals (THERAGRAN-M) tablet Take 1 tablet by mouth daily.  Misc Natural Products (LUTEIN 20 PO) Take  by mouth.  BIOTIN FORTE PO Take  by mouth.  Vitamin D (CHOLECALCIFEROL) 1000 UNITS CAPS capsule Take 1,000 Units by mouth daily. No current facility-administered medications for this visit. Social History:   Social History     Socioeconomic History    Marital status:      Spouse name: Not on file    Number of children: Not on file    Years of education: Not on file    Highest education level: Not on file   Occupational History    Not on file   Social Needs    Financial resource strain: Not on file    Food insecurity:     Worry: Not on file     Inability: Not on file    Transportation needs:     Medical: Not on file     Non-medical: Not on file   Tobacco Use    Smoking status: Never Smoker    Smokeless tobacco: Never Used   Substance and Sexual Activity    Alcohol use:  Yes     Alcohol/week: 0.0 oz     Comment: socially, moderately    Drug use: No    Sexual activity: Yes     Partners: Male   Lifestyle    Physical activity:     Days per week: Not on file     Minutes per session: Not on file    Stress: Not on file   Relationships    Social connections:     Talks on phone: Not on file     Gets together: Not on file     Attends Bahai service: Not on file     Active member of club or organization: Not on file     Attends meetings of clubs or organizations: Not on file     Relationship status: Not on file    Intimate partner violence:     Fear of current or ex partner: Not on file     Emotionally abused: Not on file     Physically abused: Not on file     Forced sexual activity: Not on file   Other Topics Concern    Not on file   Social History Narrative    Not on file       TOBACCO:   reports that she has never smoked. She has never used smokeless tobacco.  ETOH:   reports that she drinks alcohol. Family History:   Family History   Problem Relation Age of Onset    High Cholesterol Mother     High Cholesterol Father          A:  Administered the PHQ9 which indicates a self report of minimal symptom distress. Pt would benefit from [facility] services to increase coping skills to provide symptom management/control/relief. PHQ Scores 5/21/2019 5/16/2019 5/9/2019 11/27/2018 11/16/2017 11/15/2016   PHQ2 Score 0 3 0 0 0 0   PHQ9 Score 2 7 0 0 0 0     Interpretation of Total Score Depression Severity: 1-4 = Minimal depression, 5-9 = Mild depression, 10-14 = Moderate depression, 15-19 = Moderately severe depression, 20-27 = Severe depression      Diagnosis:    Adjustment disorder with anxiety  Grief reaction        Diagnosis Date    Abnormal white blood cell (WBC) count 11/15/2016    Hiatal hernia     Hyperlipidemia     Hypothyroidism     Pap smear for cervical cancer screening 9/2011    normal    prophylaxis for Recurrent UTI 12/18/2013    Screening mammogram 9/2011    normal           Plan:  Pt interventions:  Conducted functional assessment, [place]-setting to identify pt's primary goals for [facility] visit / overall health, Supportive techniques, Provided Psychoeducation re: anxiety, depression and stress management, Explained relaxed breathing technique in detail and practiced this with pt in visit, Emphasized importance of regular practice of relaxation strategies to target / promote symptom relief and Provided pt list of websites and several smartphone bandar resources for further practicing guided meditations and breathing exercises. Pt Behavioral Change Plan:    1. Dedicate 5 minutes 3x/day to practice the deep breathing    2.  Review the list of apps and give some a try! [facility] Box, CBTi  and

## 2019-05-22 DIAGNOSIS — F41.1 ANXIETY STATE: ICD-10-CM

## 2019-05-22 RX ORDER — ALPRAZOLAM 0.5 MG/1
0.5 TABLET ORAL DAILY PRN
Qty: 30 TABLET | Refills: 0 | Status: SHIPPED | OUTPATIENT
Start: 2019-05-22 | End: 2019-06-21

## 2019-05-30 ENCOUNTER — OFFICE VISIT (OUTPATIENT)
Dept: INTERNAL MEDICINE | Age: 69
End: 2019-05-30
Payer: MEDICARE

## 2019-05-30 VITALS
HEART RATE: 94 BPM | DIASTOLIC BLOOD PRESSURE: 84 MMHG | OXYGEN SATURATION: 99 % | SYSTOLIC BLOOD PRESSURE: 138 MMHG | BODY MASS INDEX: 20.34 KG/M2 | WEIGHT: 126 LBS

## 2019-05-30 DIAGNOSIS — K58.9 IRRITABLE BOWEL SYNDROME, UNSPECIFIED TYPE: Primary | ICD-10-CM

## 2019-05-30 DIAGNOSIS — F43.22 ADJUSTMENT DISORDER WITH ANXIETY: ICD-10-CM

## 2019-05-30 PROCEDURE — G8400 PT W/DXA NO RESULTS DOC: HCPCS | Performed by: FAMILY MEDICINE

## 2019-05-30 PROCEDURE — G8420 CALC BMI NORM PARAMETERS: HCPCS | Performed by: FAMILY MEDICINE

## 2019-05-30 PROCEDURE — 99214 OFFICE O/P EST MOD 30 MIN: CPT | Performed by: FAMILY MEDICINE

## 2019-05-30 PROCEDURE — 1123F ACP DISCUSS/DSCN MKR DOCD: CPT | Performed by: FAMILY MEDICINE

## 2019-05-30 PROCEDURE — 1036F TOBACCO NON-USER: CPT | Performed by: FAMILY MEDICINE

## 2019-05-30 PROCEDURE — 4040F PNEUMOC VAC/ADMIN/RCVD: CPT | Performed by: FAMILY MEDICINE

## 2019-05-30 PROCEDURE — 1090F PRES/ABSN URINE INCON ASSESS: CPT | Performed by: FAMILY MEDICINE

## 2019-05-30 PROCEDURE — 3017F COLORECTAL CA SCREEN DOC REV: CPT | Performed by: FAMILY MEDICINE

## 2019-05-30 PROCEDURE — G8427 DOCREV CUR MEDS BY ELIG CLIN: HCPCS | Performed by: FAMILY MEDICINE

## 2019-05-30 RX ORDER — DICYCLOMINE HCL 20 MG
20 TABLET ORAL EVERY 6 HOURS
COMMUNITY
End: 2019-08-29

## 2019-05-30 RX ORDER — OMEPRAZOLE 20 MG/1
20 CAPSULE, DELAYED RELEASE ORAL 2 TIMES DAILY
COMMUNITY
End: 2019-08-29

## 2019-05-30 NOTE — PROGRESS NOTES
or organizations: Not on file     Relationship status: Not on file    Intimate partner violence:     Fear of current or ex partner: Not on file     Emotionally abused: Not on file     Physically abused: Not on file     Forced sexual activity: Not on file   Other Topics Concern    Not on file   Social History Narrative    Not on file     Current Outpatient Medications on File Prior to Visit   Medication Sig Dispense Refill    omeprazole (PRILOSEC) 20 MG delayed release capsule Take 20 mg by mouth 2 times daily Bid for 14 days      dicyclomine (BENTYL) 20 MG tablet Take 20 mg by mouth every 6 hours      nitrofurantoin, macrocrystal-monohydrate, (MACROBID) 100 MG capsule TAKE 1 CAPSULE BY MOUTH once as needed (postcoital for uti prophylaxis)  1    ondansetron (ZOFRAN-ODT) 4 MG disintegrating tablet Take 1 tablet by mouth every 8 hours as needed for Nausea or Vomiting 15 tablet 0    levothyroxine (LEVOTHROID) 88 MCG tablet Take 1 tablet by mouth daily 90 tablet 3    Probiotic Product (PROBIOTIC DAILY PO) Take 1 tablet by mouth daily.  aspirin 81 MG tablet Take 81 mg by mouth daily.  Omega 3 1000 MG CAPS Take 1 Can by mouth daily.  ECHINACEA by Does not apply route.  therapeutic multivitamin-minerals (THERAGRAN-M) tablet Take 1 tablet by mouth daily.  Misc Natural Products (LUTEIN 20 PO) Take  by mouth.  BIOTIN FORTE PO Take  by mouth.  Vitamin D (CHOLECALCIFEROL) 1000 UNITS CAPS capsule Take 1,000 Units by mouth daily.  ALPRAZolam (XANAX) 0.5 MG tablet Take 1 tablet by mouth daily as needed for Sleep for up to 30 days. 30 tablet 0     No current facility-administered medications on file prior to visit. No Known Allergies    Chief Complaint   Patient presents with    [facility]28[facility]19 Abdominal pain, diarrhea. acid reflux. hiatal hernia. has appt with gastro on Monday to have scope done. pain and diarrhea is gone now.         HPI  ER Physician Interpretation * * * *     EXAMINATION:  CT ABDOMEN AND PELVIS WITH IV CONTRAST    CLINICAL HISTORY: Infection, abdomen-pelvis, Nausea, vomiting  Contrast and Creatinine->Wait for labs acute illness  abd pain w nausea  hx of nichole    TECHNIQUE: CT of the abdomen and pelvis was performed using standard   technique, scanning from just above the dome of the diaphragm to the   symphysis pubis. MQ:  CTAP_3    Contrast:  IV:  100 ml of Omnipaque 300  Oral contrast material was not administered at the request of the   ordering physician. CT Radiation dose: Integrated Dose-length product (DLP) for this visit =    263.84 mGy*cm. CT Dose Reduction Employed: Automated exposure control(AEC) and iterative   recon    COMPARISON: None. RESULT:    Liver: No hepatomegaly or focal mass    Biliary: The gallbladder surgically absent. No intrahepatic biliary   dilatation    Spleen: No mass. No splenomegaly. Pancreas: No mass or duct dilation. Adrenals: No mass. Both kidneys are normal in size, contour and position and promptly and   symmetrically excrete contrast material.  There are no obstructive   changes. There are no suspicious solid masses. GI tract: The bowel gas pattern is nonobstructive. There is no bowel   wall thickening or inflammatory process. The appendix is segmentally   visualized and unremarkable. Multiple diverticula are present compatible   with diverticulosis. There is no wall thickening or pericolonic   inflammatory change to suggest diverticulitis. There is no free air, free   fluid or abscess. Lymph nodes: No abdominal or pelvic lymphadenopathy. Mesentery/Peritoneum: No ascites or mass. Retroperitoneum: No mass. Vasculature: The aorta is normal course and caliber with mild   atherosclerotic plaque. Mesenteric vessels are patent. Pelvis: No mass, ascites or fluid collection. Bones/Soft Tissues: The bony structures are intact    Lower thorax:  The lung bases are clear      IMPRESSION  IMPRESSION:    No acute intra-abdominal or pelvic process. Nonobstructive bowel gas pattern with. Normal appendix. Negative free air, free fluid or abscess. Started on PPI BID for possible PUD  She is seeing GI next week for EGD    Review of Systems  HEENT: Negative for eye discharge and vision loss. Negative for ear drainage, hearing loss and nasal drainage. Respiratory: Negative for cough, dyspnea and wheezing. Cardiovascular:  Negative for chest pain, claudication and irregular heartbeat/palpitations. Gastrointestinal: Negative for abdominal pain, nausea, constipation and diarrhea. Physical Exam  Vitals:    05/30/19 0819   BP: 138/84   Pulse: 94   SpO2: 99%   Body mass index is 20.34 kg/m². Physical Exam  Constitutional:  Appears well-developed and well-nourished. No distress. HENT:   Head: Normocephalic and atraumatic. Right Ear: External ear normal.   Left Ear: External ear normal.   Nose: Nose normal.   Eyes: Conjunctivae and EOM are normal.  Right eye exhibits no discharge. Left eye exhibits no discharge. No scleral icterus. Neck: Normal range of motion. Cardiovascular: Normal rate, regular rhythm and normal heart sounds. No murmur heard. Pulmonary/Chest: Effort normal and breath sounds normal. No respiratory distress. no wheezes. no rales. no tenderness. Abd: soft NT ND  Musculoskeletal: Normal range of motion. Neurological: alert. Skin: not diaphoretic. Psychiatric: normal mood and affect. behavior is normal. Judgment and thought content normal.   Nursing note and vitals reviewed.       Assessment:  Hailee Meeks was seen today for follow-up from hospital.    Diagnoses and all orders for this visit:    Irritable bowel syndrome, unspecified type  Adjustment disorder with anxiety  Reviewed all ER labs/imaging  Most likely IBS and anxiety  Pt has been going through a lot recently  Advised cont with GI f/u to get EGD done  Can cont PPI until EGD

## 2019-06-05 ENCOUNTER — TELEPHONE (OUTPATIENT)
Dept: INTERNAL MEDICINE | Age: 69
End: 2019-06-05

## 2019-06-05 NOTE — TELEPHONE ENCOUNTER
Pt called to say that she had her endoscopy yesterday and was told she only has acid reflux. She is also wondering if she should have her thyroid rechecked, concerned that she may need a dose adjustment. She states with all that she has gone through recently and the fact that her mother had lots of issues and they stemmed from her thyroid being off, it makes her wonder. Please advise.

## 2019-06-16 ENCOUNTER — OFFICE VISIT (OUTPATIENT)
Dept: INTERNAL MEDICINE | Age: 69
End: 2019-06-16
Payer: MEDICARE

## 2019-06-16 VITALS
SYSTOLIC BLOOD PRESSURE: 110 MMHG | DIASTOLIC BLOOD PRESSURE: 60 MMHG | WEIGHT: 123.3 LBS | BODY MASS INDEX: 19.82 KG/M2 | RESPIRATION RATE: 18 BRPM | OXYGEN SATURATION: 95 % | HEART RATE: 83 BPM | TEMPERATURE: 97.8 F | HEIGHT: 66 IN

## 2019-06-16 DIAGNOSIS — H65.91 FLUID LEVEL BEHIND TYMPANIC MEMBRANE OF RIGHT EAR: Primary | ICD-10-CM

## 2019-06-16 PROCEDURE — G8400 PT W/DXA NO RESULTS DOC: HCPCS | Performed by: NURSE PRACTITIONER

## 2019-06-16 PROCEDURE — 1036F TOBACCO NON-USER: CPT | Performed by: NURSE PRACTITIONER

## 2019-06-16 PROCEDURE — 1123F ACP DISCUSS/DSCN MKR DOCD: CPT | Performed by: NURSE PRACTITIONER

## 2019-06-16 PROCEDURE — 99213 OFFICE O/P EST LOW 20 MIN: CPT | Performed by: NURSE PRACTITIONER

## 2019-06-16 PROCEDURE — 3017F COLORECTAL CA SCREEN DOC REV: CPT | Performed by: NURSE PRACTITIONER

## 2019-06-16 PROCEDURE — G8420 CALC BMI NORM PARAMETERS: HCPCS | Performed by: NURSE PRACTITIONER

## 2019-06-16 PROCEDURE — 4040F PNEUMOC VAC/ADMIN/RCVD: CPT | Performed by: NURSE PRACTITIONER

## 2019-06-16 PROCEDURE — 1090F PRES/ABSN URINE INCON ASSESS: CPT | Performed by: NURSE PRACTITIONER

## 2019-06-16 PROCEDURE — G8427 DOCREV CUR MEDS BY ELIG CLIN: HCPCS | Performed by: NURSE PRACTITIONER

## 2019-06-16 RX ORDER — SERTRALINE HYDROCHLORIDE 25 MG/1
TABLET, FILM COATED ORAL
Refills: 3 | COMMUNITY
Start: 2019-06-13 | End: 2019-08-04 | Stop reason: SDUPTHER

## 2019-06-16 RX ORDER — FLUTICASONE PROPIONATE 50 MCG
1 SPRAY, SUSPENSION (ML) NASAL DAILY
Qty: 1 BOTTLE | Refills: 0 | Status: SHIPPED | OUTPATIENT
Start: 2019-06-16 | End: 2019-08-12 | Stop reason: SDUPTHER

## 2019-06-16 ASSESSMENT — ENCOUNTER SYMPTOMS
WHEEZING: 0
EYE REDNESS: 0
COUGH: 0
NAUSEA: 0
VOMITING: 0
RHINORRHEA: 0
ABDOMINAL PAIN: 0
SORE THROAT: 0
EYE PAIN: 0
DIARRHEA: 0
SHORTNESS OF BREATH: 0

## 2019-06-16 NOTE — PROGRESS NOTES
Subjective  Whitney Ring, 81 y.o. female presents today with:  Chief Complaint   Patient presents with    Otalgia     x 3 weeks pressure and pain        Otalgia    There is pain in the right ear. This is a new problem. Episode onset: 3 weeks ago. The problem occurs constantly. The problem has been waxing and waning. There has been no fever. The pain is at a severity of 2/10. The pain is mild. Pertinent negatives include no abdominal pain, coughing, diarrhea, headaches, neck pain, rhinorrhea, sore throat or vomiting. Treatments tried: OTC ear relief. The treatment provided no relief. Review of Systems   Constitutional: Negative for diaphoresis, fatigue and fever. HENT: Positive for ear pain and postnasal drip. Negative for congestion, rhinorrhea and sore throat. Eyes: Negative for pain and redness. Respiratory: Negative for cough, shortness of breath and wheezing. Cardiovascular: Negative for chest pain and palpitations. Gastrointestinal: Negative for abdominal pain, diarrhea, nausea and vomiting. Musculoskeletal: Negative for joint swelling, myalgias and neck pain. Neurological: Negative for dizziness, light-headedness and headaches.        Past Medical History:   Diagnosis Date    Abnormal white blood cell (WBC) count 11/15/2016    Hiatal hernia     Hyperlipidemia     Hypothyroidism     Pap smear for cervical cancer screening 9/2011    normal    prophylaxis for Recurrent UTI 12/18/2013    Screening mammogram 9/2011    normal     Past Surgical History:   Procedure Laterality Date    CHOLECYSTECTOMY      COLONOSCOPY      when she turned 50, normal    KNEE SURGERY Bilateral     right knee replacement, left knee      Social History     Socioeconomic History    Marital status:      Spouse name: Not on file    Number of children: Not on file    Years of education: Not on file    Highest education level: Not on file   Occupational History    Not on file   Social Needs  Financial resource strain: Not on [employer] insecurity:     Worry: Not on file     Inability: Not on file    Transportation needs:     Medical: Not on file     Non-medical: Not on file   Tobacco Use    Smoking status: Never Smoker    Smokeless tobacco: Never Used   Substance and Sexual Activity    Alcohol use: Yes     Alcohol/week: 0.0 oz     Comment: socially, moderately    Drug use: No    Sexual activity: Yes     Partners: Male   Lifestyle    Physical activity:     Days per week: Not on file     Minutes per session: Not on file    Stress: Not on file   Relationships    Social connections:     Talks on phone: Not on file     Gets together: Not on file     Attends Restorationism service: Not on file     Active member of club or organization: Not on file     Attends meetings of clubs or organizations: Not on file     Relationship status: Not on file    Intimate partner violence:     Fear of current or ex partner: Not on file     Emotionally abused: Not on file     Physically abused: Not on file     Forced sexual activity: Not on file   Other Topics Concern    Not on file   Social History Narrative    Not on file     Family History   Problem Relation Age of Onset    High Cholesterol Mother     High Cholesterol Father      No Known Allergies  Current Outpatient Medications   Medication Sig Dispense Refill    sertraline (ZOLOFT) 25 MG tablet TAKE 1 TABLET BY MOUTH DAILY  3    fluticasone (FLONASE) 50 MCG/ACT nasal spray 1 spray by Nasal route daily 1 Bottle 0    omeprazole (PRILOSEC) 20 MG delayed release capsule Take 20 mg by mouth 2 times daily Bid for 14 days      dicyclomine (BENTYL) 20 MG tablet Take 20 mg by mouth every 6 hours      levothyroxine (LEVOTHROID) 88 MCG tablet Take 1 tablet by mouth daily 90 tablet 3    Probiotic Product (PROBIOTIC DAILY PO) Take 1 tablet by mouth daily.  aspirin 81 MG tablet Take 81 mg by mouth daily.  Omega 3 1000 MG CAPS Take 1 Can by mouth daily.  ECHINACEA by Does not apply route.  therapeutic multivitamin-minerals (THERAGRAN-M) tablet Take 1 tablet by mouth daily.  Misc Natural Products (LUTEIN 20 PO) Take  by mouth.  BIOTIN FORTE PO Take  by mouth.  Vitamin D (CHOLECALCIFEROL) 1000 UNITS CAPS capsule Take 1,000 Units by mouth daily.  ALPRAZolam (XANAX) 0.5 MG tablet Take 1 tablet by mouth daily as needed for Sleep for up to 30 days. 30 tablet 0    nitrofurantoin, macrocrystal-monohydrate, (MACROBID) 100 MG capsule TAKE 1 CAPSULE BY MOUTH once as needed (postcoital for uti prophylaxis)  1    ondansetron (ZOFRAN-ODT) 4 MG disintegrating tablet Take 1 tablet by mouth every 8 hours as needed for Nausea or Vomiting 15 tablet 0     No current facility-administered medications for this visit. PMH, Surgical Hx, Family Hx, and Social Hx reviewed and updated. Health Maintenance reviewed. Objective  Vitals:    06/16/19 1407   BP: 110/60   Pulse: 83   Resp: 18   Temp: 97.8 °F (36.6 °C)   TempSrc: Oral   SpO2: 95%   Weight: 123 lb 4.8 oz (55.9 kg)   Height: 5' 6\" (1.676 m)     BP Readings from Last 3 Encounters:   06/16/19 110/60   05/30/19 138/84   05/17/19 124/80     Wt Readings from Last 3 Encounters:   06/16/19 123 lb 4.8 oz (55.9 kg)   05/30/19 126 lb (57.2 kg)   05/17/19 130 lb (59 kg)     Physical Exam   Constitutional: She is oriented to person, place, and time. She appears well-developed and well-nourished. She is active. HENT:   Head: Normocephalic and atraumatic. Right Ear: A middle ear effusion is present. Left Ear: A middle ear effusion is present. Nose: Nose normal.   Mouth/Throat: Uvula is midline, oropharynx is clear and moist and mucous membranes are normal.   Eyes: Pupils are equal, round, and reactive to light. Conjunctivae and EOM are normal.   Neck: Full passive range of motion without pain. No muscular tenderness present.    Cardiovascular: Normal rate, regular rhythm, S1 normal, S2 normal and normal heart sounds. Pulmonary/Chest: Effort normal and breath sounds normal. No respiratory distress. She has no wheezes. She has no rhonchi. She has no rales. Musculoskeletal: Normal range of motion. Lymphadenopathy:        Head (right side): No submandibular, no tonsillar, no preauricular and no posterior auricular adenopathy present. Head (left side): No submandibular, no tonsillar, no preauricular and no posterior auricular adenopathy present. She has no cervical adenopathy. Neurological: She is alert and oriented to person, place, and time. She has normal strength. No sensory deficit. Skin: Skin is warm, dry and intact. No rash noted. Psychiatric: She has a normal mood and affect. Her speech is normal and behavior is normal.     Assessment & Plan    Diagnosis Orders   1. Fluid level behind tympanic membrane of right ear  fluticasone (FLONASE) 50 MCG/ACT nasal spray       There are no discontinued medications. Return in about 2 weeks (around 6/30/2019), or if symptoms worsen or fail to improve. Reviewed with the patient: current clinical status,medications, activities and diet. Side effects, adverse effects of themedication prescribed today, as well as treatment plan/ rationale and result expectations have been discussed with the patient who expresses understanding and desires to proceed. Close follow up to evaluate treatment results and for coordination of care. I have reviewed the patient's medical history in detail and updated the computerized patient record.     Camillo Hashimoto, APRN

## 2019-08-04 RX ORDER — SERTRALINE HYDROCHLORIDE 25 MG/1
TABLET, FILM COATED ORAL
Qty: 30 TABLET | Refills: 3 | Status: SHIPPED | OUTPATIENT
Start: 2019-08-04 | End: 2019-12-02 | Stop reason: SDUPTHER

## 2019-08-12 DIAGNOSIS — H65.91 FLUID LEVEL BEHIND TYMPANIC MEMBRANE OF RIGHT EAR: ICD-10-CM

## 2019-08-13 RX ORDER — FLUTICASONE PROPIONATE 50 MCG
SPRAY, SUSPENSION (ML) NASAL
Qty: 16 G | Refills: 0 | Status: SHIPPED | OUTPATIENT
Start: 2019-08-13 | End: 2019-08-28 | Stop reason: SDUPTHER

## 2019-08-28 DIAGNOSIS — H65.91 FLUID LEVEL BEHIND TYMPANIC MEMBRANE OF RIGHT EAR: ICD-10-CM

## 2019-08-29 ENCOUNTER — OFFICE VISIT (OUTPATIENT)
Dept: INTERNAL MEDICINE | Age: 69
End: 2019-08-29
Payer: MEDICARE

## 2019-08-29 VITALS
WEIGHT: 128.4 LBS | HEART RATE: 72 BPM | SYSTOLIC BLOOD PRESSURE: 120 MMHG | OXYGEN SATURATION: 98 % | DIASTOLIC BLOOD PRESSURE: 78 MMHG | BODY MASS INDEX: 20.63 KG/M2 | HEIGHT: 66 IN

## 2019-08-29 DIAGNOSIS — H69.80 DYSFUNCTION OF EUSTACHIAN TUBE, UNSPECIFIED LATERALITY: ICD-10-CM

## 2019-08-29 DIAGNOSIS — F43.23 ADJUSTMENT DISORDER WITH MIXED ANXIETY AND DEPRESSED MOOD: Primary | ICD-10-CM

## 2019-08-29 DIAGNOSIS — E78.00 PURE HYPERCHOLESTEROLEMIA: ICD-10-CM

## 2019-08-29 PROCEDURE — 1036F TOBACCO NON-USER: CPT | Performed by: FAMILY MEDICINE

## 2019-08-29 PROCEDURE — G8400 PT W/DXA NO RESULTS DOC: HCPCS | Performed by: FAMILY MEDICINE

## 2019-08-29 PROCEDURE — G8420 CALC BMI NORM PARAMETERS: HCPCS | Performed by: FAMILY MEDICINE

## 2019-08-29 PROCEDURE — 4040F PNEUMOC VAC/ADMIN/RCVD: CPT | Performed by: FAMILY MEDICINE

## 2019-08-29 PROCEDURE — 1090F PRES/ABSN URINE INCON ASSESS: CPT | Performed by: FAMILY MEDICINE

## 2019-08-29 PROCEDURE — 1123F ACP DISCUSS/DSCN MKR DOCD: CPT | Performed by: FAMILY MEDICINE

## 2019-08-29 PROCEDURE — 3017F COLORECTAL CA SCREEN DOC REV: CPT | Performed by: FAMILY MEDICINE

## 2019-08-29 PROCEDURE — 99214 OFFICE O/P EST MOD 30 MIN: CPT | Performed by: FAMILY MEDICINE

## 2019-08-29 PROCEDURE — G8427 DOCREV CUR MEDS BY ELIG CLIN: HCPCS | Performed by: FAMILY MEDICINE

## 2019-08-29 RX ORDER — FENOFIBRATE 160 MG/1
160 TABLET ORAL DAILY
Qty: 90 TABLET | Refills: 3 | Status: SHIPPED | OUTPATIENT
Start: 2019-08-29 | End: 2019-12-02 | Stop reason: SDUPTHER

## 2019-08-29 RX ORDER — FLUTICASONE PROPIONATE 50 MCG
SPRAY, SUSPENSION (ML) NASAL
Qty: 16 G | Refills: 0 | Status: SHIPPED | OUTPATIENT
Start: 2019-08-29 | End: 2019-12-02 | Stop reason: SDUPTHER

## 2019-08-29 ASSESSMENT — ENCOUNTER SYMPTOMS
FACIAL SWELLING: 0
APNEA: 0
CHEST TIGHTNESS: 0
EYE DISCHARGE: 0
CHOKING: 0
EYE PAIN: 0
EYE ITCHING: 0

## 2019-09-06 ENCOUNTER — TELEPHONE (OUTPATIENT)
Dept: INTERNAL MEDICINE | Age: 69
End: 2019-09-06

## 2019-10-14 ENCOUNTER — NURSE ONLY (OUTPATIENT)
Dept: INTERNAL MEDICINE | Age: 69
End: 2019-10-14
Payer: MEDICARE

## 2019-10-14 DIAGNOSIS — Z23 NEED FOR IMMUNIZATION AGAINST INFLUENZA: Primary | ICD-10-CM

## 2019-10-14 PROCEDURE — 90653 IIV ADJUVANT VACCINE IM: CPT | Performed by: FAMILY MEDICINE

## 2019-10-14 PROCEDURE — G0008 ADMIN INFLUENZA VIRUS VAC: HCPCS | Performed by: FAMILY MEDICINE

## 2019-11-07 LAB — FECAL BLOOD IMMUNOCHEMICAL TEST: NEGATIVE

## 2019-11-13 RX ORDER — SULFAMETHOXAZOLE AND TRIMETHOPRIM 800; 160 MG/1; MG/1
TABLET ORAL
Qty: 12 TABLET | Refills: 1 | Status: SHIPPED | OUTPATIENT
Start: 2019-11-13 | End: 2019-12-02 | Stop reason: SDUPTHER

## 2019-11-18 DIAGNOSIS — E03.9 ACQUIRED HYPOTHYROIDISM: ICD-10-CM

## 2019-11-18 DIAGNOSIS — E03.9 ACQUIRED HYPOTHYROIDISM: Primary | ICD-10-CM

## 2019-11-18 DIAGNOSIS — M81.6 LOCALIZED OSTEOPOROSIS WITHOUT CURRENT PATHOLOGICAL FRACTURE: ICD-10-CM

## 2019-11-18 DIAGNOSIS — E78.00 PURE HYPERCHOLESTEROLEMIA: Primary | ICD-10-CM

## 2019-11-18 DIAGNOSIS — E78.00 PURE HYPERCHOLESTEROLEMIA: ICD-10-CM

## 2019-11-18 DIAGNOSIS — Z78.0 MENOPAUSE: ICD-10-CM

## 2019-11-18 PROBLEM — M81.0 OSTEOPOROSIS: Status: ACTIVE | Noted: 2019-11-18

## 2019-11-18 LAB
CHOLESTEROL, TOTAL: 213 MG/DL (ref 0–199)
HDLC SERPL-MCNC: 64 MG/DL (ref 40–59)
LDL CHOLESTEROL CALCULATED: 137 MG/DL (ref 0–129)
TRIGL SERPL-MCNC: 60 MG/DL (ref 0–150)
TSH SERPL DL<=0.05 MIU/L-ACNC: 1.74 UIU/ML (ref 0.44–3.86)
VITAMIN D 25-HYDROXY: 57.8 NG/ML (ref 30–100)

## 2019-11-20 NOTE — PROGRESS NOTES
Pt here for blood work. Completed without complication. Informed patient we will call with results. son, PH, 5+7 outdoor, 15 indoors to 2nd floor. option to stay on 1st floor

## 2019-12-02 ENCOUNTER — OFFICE VISIT (OUTPATIENT)
Dept: INTERNAL MEDICINE | Age: 69
End: 2019-12-02
Payer: MEDICARE

## 2019-12-02 VITALS
HEART RATE: 82 BPM | WEIGHT: 130.4 LBS | BODY MASS INDEX: 20.96 KG/M2 | OXYGEN SATURATION: 98 % | DIASTOLIC BLOOD PRESSURE: 82 MMHG | HEIGHT: 66 IN | SYSTOLIC BLOOD PRESSURE: 130 MMHG

## 2019-12-02 DIAGNOSIS — E03.9 ACQUIRED HYPOTHYROIDISM: ICD-10-CM

## 2019-12-02 DIAGNOSIS — F43.23 ADJUSTMENT DISORDER WITH MIXED ANXIETY AND DEPRESSED MOOD: ICD-10-CM

## 2019-12-02 DIAGNOSIS — Z00.00 ROUTINE GENERAL MEDICAL EXAMINATION AT A HEALTH CARE FACILITY: Primary | ICD-10-CM

## 2019-12-02 DIAGNOSIS — N39.0 RECURRENT UTI: ICD-10-CM

## 2019-12-02 DIAGNOSIS — H65.91 FLUID LEVEL BEHIND TYMPANIC MEMBRANE OF RIGHT EAR: ICD-10-CM

## 2019-12-02 DIAGNOSIS — E78.00 PURE HYPERCHOLESTEROLEMIA: ICD-10-CM

## 2019-12-02 PROBLEM — M85.80 OSTEOPENIA: Status: ACTIVE | Noted: 2019-11-18

## 2019-12-02 LAB
ALBUMIN SERPL-MCNC: 4.8 G/DL (ref 3.5–4.6)
ALP BLD-CCNC: 47 U/L (ref 40–130)
ALT SERPL-CCNC: 10 U/L (ref 0–33)
ANION GAP SERPL CALCULATED.3IONS-SCNC: 12 MEQ/L (ref 9–15)
AST SERPL-CCNC: 22 U/L (ref 0–35)
BILIRUB SERPL-MCNC: 0.3 MG/DL (ref 0.2–0.7)
BUN BLDV-MCNC: 17 MG/DL (ref 8–23)
CALCIUM SERPL-MCNC: 9.9 MG/DL (ref 8.5–9.9)
CHLORIDE BLD-SCNC: 104 MEQ/L (ref 95–107)
CO2: 27 MEQ/L (ref 20–31)
CREAT SERPL-MCNC: 0.79 MG/DL (ref 0.5–0.9)
GFR AFRICAN AMERICAN: >60
GFR NON-AFRICAN AMERICAN: >60
GLOBULIN: 2.8 G/DL (ref 2.3–3.5)
GLUCOSE BLD-MCNC: 90 MG/DL (ref 70–99)
POTASSIUM SERPL-SCNC: 4.8 MEQ/L (ref 3.4–4.9)
SODIUM BLD-SCNC: 143 MEQ/L (ref 135–144)
TOTAL PROTEIN: 7.6 G/DL (ref 6.3–8)

## 2019-12-02 PROCEDURE — 1123F ACP DISCUSS/DSCN MKR DOCD: CPT | Performed by: FAMILY MEDICINE

## 2019-12-02 PROCEDURE — 4040F PNEUMOC VAC/ADMIN/RCVD: CPT | Performed by: FAMILY MEDICINE

## 2019-12-02 PROCEDURE — 3017F COLORECTAL CA SCREEN DOC REV: CPT | Performed by: FAMILY MEDICINE

## 2019-12-02 PROCEDURE — G0439 PPPS, SUBSEQ VISIT: HCPCS | Performed by: FAMILY MEDICINE

## 2019-12-02 PROCEDURE — 36415 COLL VENOUS BLD VENIPUNCTURE: CPT | Performed by: FAMILY MEDICINE

## 2019-12-02 PROCEDURE — G8482 FLU IMMUNIZE ORDER/ADMIN: HCPCS | Performed by: FAMILY MEDICINE

## 2019-12-02 RX ORDER — LEVOTHYROXINE SODIUM 88 UG/1
88 TABLET ORAL DAILY
Qty: 90 TABLET | Refills: 1 | Status: SHIPPED | OUTPATIENT
Start: 2019-12-02 | End: 2020-04-27

## 2019-12-02 RX ORDER — FLUTICASONE PROPIONATE 50 MCG
SPRAY, SUSPENSION (ML) NASAL
Qty: 3 BOTTLE | Refills: 1 | Status: SHIPPED | OUTPATIENT
Start: 2019-12-02 | End: 2020-11-07

## 2019-12-02 RX ORDER — NITROFURANTOIN 25; 75 MG/1; MG/1
CAPSULE ORAL
Qty: 30 CAPSULE | Refills: 1 | Status: SHIPPED | OUTPATIENT
Start: 2019-12-02 | End: 2021-12-08 | Stop reason: SDUPTHER

## 2019-12-02 RX ORDER — SERTRALINE HYDROCHLORIDE 25 MG/1
TABLET, FILM COATED ORAL
Qty: 90 TABLET | Refills: 1 | Status: SHIPPED | OUTPATIENT
Start: 2019-12-02 | End: 2019-12-30

## 2019-12-02 RX ORDER — FENOFIBRATE 160 MG/1
160 TABLET ORAL DAILY
Qty: 90 TABLET | Refills: 1 | Status: SHIPPED | OUTPATIENT
Start: 2019-12-02 | End: 2020-05-29

## 2019-12-02 RX ORDER — SULFAMETHOXAZOLE AND TRIMETHOPRIM 800; 160 MG/1; MG/1
TABLET ORAL
Qty: 12 TABLET | Refills: 1 | Status: SHIPPED | OUTPATIENT
Start: 2019-12-02 | End: 2020-09-06 | Stop reason: ALTCHOICE

## 2019-12-02 ASSESSMENT — PATIENT HEALTH QUESTIONNAIRE - PHQ9
SUM OF ALL RESPONSES TO PHQ QUESTIONS 1-9: 0
SUM OF ALL RESPONSES TO PHQ QUESTIONS 1-9: 0

## 2020-01-30 ENCOUNTER — TELEPHONE (OUTPATIENT)
Dept: INTERNAL MEDICINE | Age: 70
End: 2020-01-30

## 2020-01-30 NOTE — TELEPHONE ENCOUNTER
Pt will need to a referral to see Dr Dylan Lyles for a growth on her uvula. Pt is aware that she will be contacted by Dr Dylan Lyles office.

## 2020-03-13 ENCOUNTER — TELEPHONE (OUTPATIENT)
Dept: INTERNAL MEDICINE | Age: 70
End: 2020-03-13

## 2020-04-27 RX ORDER — LEVOTHYROXINE SODIUM 88 UG/1
88 TABLET ORAL DAILY
Qty: 90 TABLET | Refills: 1 | Status: SHIPPED | OUTPATIENT
Start: 2020-04-27 | End: 2020-10-14

## 2020-05-28 NOTE — TELEPHONE ENCOUNTER
Requesting medication refill. Please approve or deny this request.    Rx requested:  Requested Prescriptions     Pending Prescriptions Disp Refills    sertraline (ZOLOFT) 25 MG tablet 90 tablet 1       Last Office Visit:   12/2/2019 awv    Last Filled:    12/30/19  Last Labs:      Next Visit Date:  No future appointments.

## 2020-05-29 RX ORDER — SERTRALINE HYDROCHLORIDE 25 MG/1
TABLET, FILM COATED ORAL
Qty: 90 TABLET | Refills: 1 | Status: SHIPPED | OUTPATIENT
Start: 2020-05-29 | End: 2020-12-03 | Stop reason: ALTCHOICE

## 2020-05-29 RX ORDER — FENOFIBRATE 160 MG/1
160 TABLET ORAL DAILY
Qty: 90 TABLET | Refills: 1 | Status: SHIPPED | OUTPATIENT
Start: 2020-05-29 | End: 2020-11-24

## 2020-09-06 ENCOUNTER — OFFICE VISIT (OUTPATIENT)
Dept: INTERNAL MEDICINE | Age: 70
End: 2020-09-06
Payer: MEDICARE

## 2020-09-06 VITALS
TEMPERATURE: 96.8 F | HEART RATE: 86 BPM | DIASTOLIC BLOOD PRESSURE: 74 MMHG | WEIGHT: 130 LBS | BODY MASS INDEX: 20.98 KG/M2 | SYSTOLIC BLOOD PRESSURE: 122 MMHG | OXYGEN SATURATION: 99 %

## 2020-09-06 PROCEDURE — 4040F PNEUMOC VAC/ADMIN/RCVD: CPT | Performed by: PHYSICIAN ASSISTANT

## 2020-09-06 PROCEDURE — G8427 DOCREV CUR MEDS BY ELIG CLIN: HCPCS | Performed by: PHYSICIAN ASSISTANT

## 2020-09-06 PROCEDURE — 99213 OFFICE O/P EST LOW 20 MIN: CPT | Performed by: PHYSICIAN ASSISTANT

## 2020-09-06 PROCEDURE — 1036F TOBACCO NON-USER: CPT | Performed by: PHYSICIAN ASSISTANT

## 2020-09-06 PROCEDURE — 1123F ACP DISCUSS/DSCN MKR DOCD: CPT | Performed by: PHYSICIAN ASSISTANT

## 2020-09-06 PROCEDURE — 3017F COLORECTAL CA SCREEN DOC REV: CPT | Performed by: PHYSICIAN ASSISTANT

## 2020-09-06 PROCEDURE — 1090F PRES/ABSN URINE INCON ASSESS: CPT | Performed by: PHYSICIAN ASSISTANT

## 2020-09-06 PROCEDURE — G8510 SCR DEP NEG, NO PLAN REQD: HCPCS | Performed by: PHYSICIAN ASSISTANT

## 2020-09-06 PROCEDURE — G8400 PT W/DXA NO RESULTS DOC: HCPCS | Performed by: PHYSICIAN ASSISTANT

## 2020-09-06 PROCEDURE — G8420 CALC BMI NORM PARAMETERS: HCPCS | Performed by: PHYSICIAN ASSISTANT

## 2020-09-06 RX ORDER — ESTRADIOL 10 UG/1
INSERT VAGINAL
COMMUNITY
Start: 2020-08-06 | End: 2020-10-07 | Stop reason: SDUPTHER

## 2020-09-06 RX ORDER — LORATADINE 10 MG/1
10 TABLET ORAL DAILY
Qty: 30 TABLET | Refills: 0 | Status: SHIPPED | OUTPATIENT
Start: 2020-09-06 | End: 2020-12-03 | Stop reason: ALTCHOICE

## 2020-09-06 ASSESSMENT — PATIENT HEALTH QUESTIONNAIRE - PHQ9
SUM OF ALL RESPONSES TO PHQ QUESTIONS 1-9: 0
2. FEELING DOWN, DEPRESSED OR HOPELESS: 0
SUM OF ALL RESPONSES TO PHQ QUESTIONS 1-9: 0
1. LITTLE INTEREST OR PLEASURE IN DOING THINGS: 0
SUM OF ALL RESPONSES TO PHQ9 QUESTIONS 1 & 2: 0

## 2020-09-06 ASSESSMENT — ENCOUNTER SYMPTOMS
RHINORRHEA: 0
SINUS PRESSURE: 0
SHORTNESS OF BREATH: 0
SINUS PAIN: 0
COUGH: 0
SORE THROAT: 0

## 2020-09-06 NOTE — PROGRESS NOTES
2020     Mathew Maciel (:  1950) is a 78 y.o. female, here for evaluation of the following medical concerns:    Chief Complaint   Patient presents with    Ear Fullness     x1 month, drainage in the morning, pt taking psuedoephdrine w/o relief    Dizziness         HPI      Ear fullness with some lightheadedness x 1 month  No ear pain   Never has allergies in the past , but may have some now   No hearing changes   Feels that the inside of the ear is wet in the morning     Review of Systems   Constitutional: Negative for chills and fever. HENT: Negative for congestion, ear pain, hearing loss, postnasal drip, rhinorrhea, sinus pressure, sinus pain, sore throat and tinnitus. Respiratory: Negative for cough and shortness of breath. Neurological: Positive for light-headedness. Negative for weakness and headaches. Prior to Visit Medications    Medication Sig Taking? Authorizing Provider   IMVEXXY MAINTENANCE PACK 10 MCG INST  Yes Sherley Bauer MD   loratadine (CLARITIN) 10 MG tablet Take 1 tablet by mouth daily Yes JEWELL Morales   fenofibrate (TRIGLIDE) 160 MG tablet TAKE 1 TABLET BY MOUTH  DAILY Yes Alejandro Mead MD   levothyroxine (SYNTHROID) 88 MCG tablet TAKE 1 TABLET BY MOUTH  DAILY Yes Alejandro Mead MD   nitrofurantoin, macrocrystal-monohydrate, (MACROBID) 100 MG capsule TAKE 1 CAPSULE BY MOUTH once as needed (postcoital for uti prophylaxis) Yes Alejandro Mead MD   fluticasone (FLONASE) 50 MCG/ACT nasal spray USE 1 SPRAY NASALLY EVERY  DAY Yes Alejandro Mead MD   Omega 3 1000 MG CAPS Take 1 Can by mouth daily. Yes Historical Provider, MD   [facility] by Does not apply route. Yes Historical Provider, MD   therapeutic multivitamin-minerals (THERAGRAN-M) tablet Take 1 tablet by mouth daily. Yes Historical Provider, MD   Misc Natural Products (LUTEIN 20 PO) Take  by mouth. Yes Historical Provider, MD   BIOTIN FORTE PO Take  by mouth.  Yes Historical Provider, MD Vitamin D (CHOLECALCIFEROL) 1000 UNITS CAPS capsule Take 1,000 Units by mouth daily. Yes Historical Provider, MD   sertraline (ZOLOFT) 25 MG tablet 1 tab PO QD  Patient not taking: Reported on 9/6/2020  Jenniffer Daniels MD        Social History     Tobacco Use    Smoking status: Never Smoker    Smokeless tobacco: Never Used   Substance Use Topics    Alcohol use: Yes     Alcohol/week: 0.0 standard drinks     Types: 3 Glasses of wine per week     Comment: socially, moderately        Vitals:    09/06/20 1548   BP: 122/74   Pulse: 86   Temp: 96.8 °F (36 °C)   TempSrc: Infrared   SpO2: 99%   Weight: 130 lb (59 kg)     Estimated body mass index is 20.98 kg/m² as calculated from the following:    Height as of 12/2/19: 5' 6\" (1.676 m). Weight as of this encounter: 130 lb (59 kg). Physical Exam  Vitals signs reviewed. HENT:      Right Ear: Ear canal and external ear normal. A middle ear effusion is present. There is no impacted cerumen. No mastoid tenderness. Tympanic membrane is not erythematous, retracted or bulging. Left Ear: Ear canal and external ear normal. A middle ear effusion is present. There is no impacted cerumen. No mastoid tenderness. Tympanic membrane is not erythematous or retracted. Neck:      Musculoskeletal: Normal range of motion and neck supple. Neurological:      Mental Status: She is alert. ASSESSMENT/PLAN:  1. Ear fullness, bilateral  - continue daily Flonase   - loratadine (CLARITIN) 10 MG tablet; Take 1 tablet by mouth daily  Dispense: 30 tablet; Refill: 0  - f/u with PCP if no improvement     No follow-ups on file. An electronic signature was used to authenticate this note.     --JEWELL Ness on 9/6/2020 at 4:22 PM

## 2020-10-07 NOTE — TELEPHONE ENCOUNTER
That is correct, but pt states she no longer sees the physician that did and wants you to prescribe.

## 2020-10-07 NOTE — TELEPHONE ENCOUNTER
Requesting medication refill. Please approve or deny this request.    Rx requested:  Requested Prescriptions     Pending Prescriptions Disp Refills    IMVEXXY MAINTENANCE PACK 10 MCG INST 24 each 1     Sig: Insert one vaginally on Monday and Thursday       Last Office Visit, reason seen and by who:   12/2/2019 medicare Dr. Lilly Herrera: Unknown Cash   Return in 1 year (on 12/2/2020), or AWV, for Medicare Annual Wellness Visit in 1 year.            PATIENT CONTACTED FOR A FOLLOW UP APPT: YES OR NO    See below    Next Visit Date:  Future Appointments   Date Time Provider Jung Collins   11/25/2020  8:30 AM SCHEDULE, [address]   12/3/2020 10:15 AM Yamilex Cooley MD [facility]

## 2020-10-08 RX ORDER — ESTRADIOL 10 UG/1
INSERT VAGINAL
Qty: 24 EACH | Refills: 1 | Status: SHIPPED | OUTPATIENT
Start: 2020-10-08 | End: 2021-02-07

## 2020-10-14 RX ORDER — LEVOTHYROXINE SODIUM 88 UG/1
88 TABLET ORAL DAILY
Qty: 90 TABLET | Refills: 1 | Status: SHIPPED | OUTPATIENT
Start: 2020-10-14 | End: 2021-03-25

## 2020-10-14 NOTE — TELEPHONE ENCOUNTER
Requesting medication refill.  Please approve or deny this request.    Rx requested:  Requested Prescriptions     Pending Prescriptions Disp Refills    levothyroxine (SYNTHROID) 88 MCG tablet [Pharmacy Med Name: LEVOTHYROXINE  88MCG  TAB] 90 tablet 3     Sig: TAKE 1 TABLET BY MOUTH  DAILY       Last Office Visit, reason seen and by who:   12/2/2019 AWV and labs Dr. Zambrano Ramp: Alexey Fruits     Return in 1 year (on 12/2/2020), or AWV, for Medicare Annual Wellness Visit in 1 year.              PATIENT CONTACTED FOR A FOLLOW UP APPT: YES OR NO    See below    Next Visit Date:  Future Appointments   Date Time Provider Jung Collins   11/25/2020  8:30 AM SCHEDULE, [address]   12/3/2020 10:15 AM Priya Lawton MD [facility]

## 2020-11-07 NOTE — TELEPHONE ENCOUNTER
Requesting medication refill. Please approve or deny this request.    Rx requested:  Requested Prescriptions     Pending Prescriptions Disp Refills    fluticasone (FLONASE) 50 MCG/ACT nasal spray [Pharmacy Med Name: FLUTICASONE  50MCG  SPRAY  PROP.] 32 g      Sig: USE 1 SPRAY NASALLY EVERY  DAY       Last Office Visit:   12/2/2019        REASON LAST SEEN AND BY WHO:    Dr Ashley Johnson PCP VISIT: COPY AND PASTE FROM LAST PCP NOTE     Return in 1 year (on 12/2/2020), or AWV, for Medicare Annual Wellness Visit in 1 year.          PATIENT CONTACTED FOR A FOLLOW UP APPT: YES OR NO    no    Next Visit Date:  Future Appointments   Date Time Provider Jung Collins   11/25/2020  8:30 AM SCHEDULE, [facility] Pathak   12/3/2020 10:15 AM Manish Mike MD [facility]

## 2020-11-10 RX ORDER — FLUTICASONE PROPIONATE 50 MCG
SPRAY, SUSPENSION (ML) NASAL
Qty: 32 G | Refills: 5 | Status: SHIPPED | OUTPATIENT
Start: 2020-11-10 | End: 2021-12-08 | Stop reason: SDUPTHER

## 2020-11-24 RX ORDER — FENOFIBRATE 160 MG/1
160 TABLET ORAL DAILY
Qty: 90 TABLET | Refills: 3 | Status: SHIPPED | OUTPATIENT
Start: 2020-11-24 | End: 2021-10-18 | Stop reason: SDUPTHER

## 2020-11-26 ASSESSMENT — LIFESTYLE VARIABLES
HOW OFTEN DO YOU HAVE SIX OR MORE DRINKS ON ONE OCCASION: 0
AUDIT-C TOTAL SCORE: 4
AUDIT TOTAL SCORE: 4
HOW OFTEN DO YOU HAVE SIX OR MORE DRINKS ON ONE OCCASION: NEVER
HOW OFTEN DURING THE LAST YEAR HAVE YOU FAILED TO DO WHAT WAS NORMALLY EXPECTED FROM YOU BECAUSE OF DRINKING: NEVER
HOW MANY STANDARD DRINKS CONTAINING ALCOHOL DO YOU HAVE ON A TYPICAL DAY: 0
HOW OFTEN DURING THE LAST YEAR HAVE YOU HAD A FEELING OF GUILT OR REMORSE AFTER DRINKING: NEVER
HOW OFTEN DO YOU HAVE A DRINK CONTAINING ALCOHOL: 4
HOW OFTEN DO YOU HAVE A DRINK CONTAINING ALCOHOL: FOUR OR MORE TIMES A WEEK
HOW OFTEN DURING THE LAST YEAR HAVE YOU BEEN UNABLE TO REMEMBER WHAT HAPPENED THE NIGHT BEFORE BECAUSE YOU HAD BEEN DRINKING: NEVER
HAVE YOU OR SOMEONE ELSE BEEN INJURED AS A RESULT OF YOUR DRINKING: NO
HOW OFTEN DURING THE LAST YEAR HAVE YOU HAD A FEELING OF GUILT OR REMORSE AFTER DRINKING: 0
AUDIT TOTAL SCORE: 0
HAS A RELATIVE, FRIEND, DOCTOR, OR ANOTHER HEALTH PROFESSIONAL EXPRESSED CONCERN ABOUT YOUR DRINKING OR SUGGESTED YOU CUT DOWN: NO
HOW OFTEN DURING THE LAST YEAR HAVE YOU FOUND THAT YOU WERE NOT ABLE TO STOP DRINKING ONCE YOU HAD STARTED: 0
HAS A RELATIVE, FRIEND, DOCTOR, OR ANOTHER HEALTH PROFESSIONAL EXPRESSED CONCERN ABOUT YOUR DRINKING OR SUGGESTED YOU CUT DOWN: 0
HAVE YOU OR SOMEONE ELSE BEEN INJURED AS A RESULT OF YOUR DRINKING: 0
HOW MANY STANDARD DRINKS CONTAINING ALCOHOL DO YOU HAVE ON A TYPICAL DAY: ONE OR TWO
HOW OFTEN DURING THE LAST YEAR HAVE YOU NEEDED AN ALCOHOLIC DRINK FIRST THING IN THE MORNING TO GET YOURSELF GOING AFTER A NIGHT OF HEAVY DRINKING: 0
HOW OFTEN DURING THE LAST YEAR HAVE YOU NEEDED AN ALCOHOLIC DRINK FIRST THING IN THE MORNING TO GET YOURSELF GOING AFTER A NIGHT OF HEAVY DRINKING: NEVER
HOW OFTEN DURING THE LAST YEAR HAVE YOU FOUND THAT YOU WERE NOT ABLE TO STOP DRINKING ONCE YOU HAD STARTED: NEVER
AUDIT-C TOTAL SCORE: 0
HOW OFTEN DURING THE LAST YEAR HAVE YOU FAILED TO DO WHAT WAS NORMALLY EXPECTED FROM YOU BECAUSE OF DRINKING: 0
HOW OFTEN DURING THE LAST YEAR HAVE YOU BEEN UNABLE TO REMEMBER WHAT HAPPENED THE NIGHT BEFORE BECAUSE YOU HAD BEEN DRINKING: 0

## 2020-11-26 ASSESSMENT — PATIENT HEALTH QUESTIONNAIRE - PHQ9
1. LITTLE INTEREST OR PLEASURE IN DOING THINGS: 0
2. FEELING DOWN, DEPRESSED OR HOPELESS: 0
SUM OF ALL RESPONSES TO PHQ QUESTIONS 1-9: 0
SUM OF ALL RESPONSES TO PHQ QUESTIONS 1-9: 0
SUM OF ALL RESPONSES TO PHQ9 QUESTIONS 1 & 2: 0
SUM OF ALL RESPONSES TO PHQ QUESTIONS 1-9: 0

## 2020-11-28 ENCOUNTER — HOSPITAL ENCOUNTER (OUTPATIENT)
Dept: LAB | Age: 70
Discharge: HOME | End: 2020-11-28
Payer: MEDICARE

## 2020-11-28 LAB
ALBUMIN SERPL-MCNC: 4.5 G/DL (ref 3.5–4.6)
ALP BLD-CCNC: 48 U/L (ref 40–130)
ALT SERPL-CCNC: 10 U/L (ref 0–33)
ANION GAP SERPL CALCULATED.3IONS-SCNC: 12 MEQ/L (ref 9–15)
AST SERPL-CCNC: 22 U/L (ref 0–35)
BASOPHILS ABSOLUTE: 0 K/UL (ref 0–0.2)
BASOPHILS RELATIVE PERCENT: 0.7 %
BILIRUB SERPL-MCNC: 0.4 MG/DL (ref 0.2–0.7)
BUN BLDV-MCNC: 20 MG/DL (ref 8–23)
CALCIUM SERPL-MCNC: 10 MG/DL (ref 8.5–9.9)
CHLORIDE BLD-SCNC: 101 MEQ/L (ref 95–107)
CHOLESTEROL, TOTAL: 205 MG/DL (ref 0–199)
CO2: 26 MEQ/L (ref 20–31)
CREAT SERPL-MCNC: 0.81 MG/DL (ref 0.5–0.9)
EOSINOPHILS ABSOLUTE: 0.1 K/UL (ref 0–0.7)
EOSINOPHILS RELATIVE PERCENT: 1.7 %
GFR AFRICAN AMERICAN: >60
GFR NON-AFRICAN AMERICAN: >60
GLOBULIN: 2.4 G/DL (ref 2.3–3.5)
GLUCOSE BLD-MCNC: 103 MG/DL (ref 70–99)
HCT VFR BLD CALC: 37.5 % (ref 37–47)
HDLC SERPL-MCNC: 54 MG/DL (ref 40–59)
HEMOGLOBIN: 12.4 G/DL (ref 12–16)
LDL CHOLESTEROL CALCULATED: 137 MG/DL (ref 0–129)
LYMPHOCYTES ABSOLUTE: 1.4 K/UL (ref 1–4.8)
LYMPHOCYTES RELATIVE PERCENT: 31.9 %
MCH RBC QN AUTO: 29.9 PG (ref 27–31.3)
MCHC RBC AUTO-ENTMCNC: 33.1 % (ref 33–37)
MCV RBC AUTO: 90.5 FL (ref 82–100)
MONOCYTES ABSOLUTE: 0.5 K/UL (ref 0.2–0.8)
MONOCYTES RELATIVE PERCENT: 11.1 %
NEUTROPHILS ABSOLUTE: 2.4 K/UL (ref 1.4–6.5)
NEUTROPHILS RELATIVE PERCENT: 54.6 %
PDW BLD-RTO: 13.9 % (ref 11.5–14.5)
PLATELET # BLD: 252 K/UL (ref 130–400)
POTASSIUM SERPL-SCNC: 4.3 MEQ/L (ref 3.4–4.9)
RBC # BLD: 4.14 M/UL (ref 4.2–5.4)
SODIUM BLD-SCNC: 139 MEQ/L (ref 135–144)
T4 FREE: 1.65 NG/DL (ref 0.84–1.68)
TOTAL PROTEIN: 6.9 G/DL (ref 6.3–8)
TRIGL SERPL-MCNC: 72 MG/DL (ref 0–150)
TSH SERPL DL<=0.05 MIU/L-ACNC: 2.54 UIU/ML (ref 0.44–3.86)
WBC # BLD: 4.3 K/UL (ref 4.8–10.8)

## 2020-11-28 PROCEDURE — 80061 LIPID PANEL: CPT

## 2020-11-28 PROCEDURE — 84439 ASSAY OF FREE THYROXINE: CPT

## 2020-11-28 PROCEDURE — 85025 COMPLETE CBC W/AUTO DIFF WBC: CPT

## 2020-11-28 PROCEDURE — 36415 COLL VENOUS BLD VENIPUNCTURE: CPT

## 2020-11-28 PROCEDURE — 84443 ASSAY THYROID STIM HORMONE: CPT

## 2020-11-28 PROCEDURE — 80053 COMPREHEN METABOLIC PANEL: CPT

## 2020-12-03 ENCOUNTER — VIRTUAL VISIT (OUTPATIENT)
Dept: INTERNAL MEDICINE | Age: 70
End: 2020-12-03
Payer: MEDICARE

## 2020-12-03 PROCEDURE — 1123F ACP DISCUSS/DSCN MKR DOCD: CPT | Performed by: FAMILY MEDICINE

## 2020-12-03 PROCEDURE — 4040F PNEUMOC VAC/ADMIN/RCVD: CPT | Performed by: FAMILY MEDICINE

## 2020-12-03 PROCEDURE — 3017F COLORECTAL CA SCREEN DOC REV: CPT | Performed by: FAMILY MEDICINE

## 2020-12-03 PROCEDURE — G0439 PPPS, SUBSEQ VISIT: HCPCS | Performed by: FAMILY MEDICINE

## 2020-12-03 RX ORDER — SULFAMETHOXAZOLE AND TRIMETHOPRIM 800; 160 MG/1; MG/1
TABLET ORAL
Qty: 12 TABLET | Refills: 1 | Status: SHIPPED | OUTPATIENT
Start: 2020-12-03 | End: 2021-12-08 | Stop reason: SDUPTHER

## 2020-12-03 RX ORDER — SULFAMETHOXAZOLE AND TRIMETHOPRIM 800; 160 MG/1; MG/1
1 TABLET ORAL 2 TIMES DAILY
COMMUNITY
End: 2021-12-08

## 2020-12-03 RX ORDER — TRIAMCINOLONE ACETONIDE 1 MG/G
CREAM TOPICAL
Qty: 80 G | Refills: 1 | Status: SHIPPED | OUTPATIENT
Start: 2020-12-03 | End: 2021-12-08 | Stop reason: SDUPTHER

## 2020-12-03 NOTE — PROGRESS NOTES
1,000 Units by mouth daily. Yes Historical Provider, MD       Past Medical History:   Diagnosis Date    Abnormal white blood cell (WBC) count 11/15/2016    Hiatal hernia     Hyperlipidemia     Hypothyroidism     Pap smear for cervical cancer screening 9/2011    normal    prophylaxis for Recurrent UTI 12/18/2013    Screening mammogram 9/2011    normal       Past Surgical History:   Procedure Laterality Date    CHOLECYSTECTOMY      COLONOSCOPY      when she turned 50, normal    KNEE SURGERY Bilateral     right knee replacement, left knee        Family History   Problem Relation Age of Onset    High Cholesterol Mother     High Cholesterol Father        CareTeam (Including outside providers/suppliers regularly involved in providing care):   Patient Care Team:  Bandar Canseco MD as PCP - General (Family Medicine)  Bandar Canseco MD as PCP - [facility] Empaneled Provider  Rachael Medina DO as Surgeon (Orthopedic Surgery)  Patrica Adam DO as Consulting Physician (Obstetrics & Gynecology)  Jazmyne Escalante (Optometry)    Wt Readings from Last 3 Encounters:   09/06/20 130 lb (59 kg)   12/02/19 130 lb 6.4 oz (59.1 kg)   08/29/19 128 lb 6.4 oz (58.2 kg)     No flowsheet data found. There is no height or weight on file to calculate BMI. Based upon direct observation of the patient, evaluation of cognition reveals recent and remote memory intact. Nursing note reviewed. Constitutional:       General: no acute distress. Appearance: Normal appearance. normal weight. not ill-appearing, toxic-appearing or diaphoretic. HENT:      Head: Normocephalic and atraumatic. Right Ear: External ear normal.      Left Ear: External ear normal.      Nose: Nose normal.   Eyes:      General: No scleral icterus. Right eye: No discharge. Left eye: No discharge. Extraocular Movements: Extraocular movements intact.       Conjunctiva/sclera: Conjunctivae normal.   Neck:      Musculoskeletal: Normal range of motion. Pulmonary:      Effort: Pulmonary effort is normal.   Musculoskeletal: Normal range of motion. Neurological:      General: No focal deficit present. Mental Status: alert. Mental status is at baseline. Psychiatric:         Attention and Perception: Attention and perception normal.         Mood and Affect: Mood and affect normal.         Speech: Speech normal.         Behavior: Behavior normal. Behavior is cooperative. Thought Content: Thought content normal.         Cognition and Memory: Memory normal.     Due to this being a TeleHealth encounter, evaluation of the following organ systems is limited: Vitals/Constitutional/EENT/Resp/CV/GI//MS/Neuro/Skin/Heme-Lymph-Imm. Patient's complete Health Risk Assessment and screening values have been reviewed and are found in Flowsheets. The following problems were reviewed today and where indicated follow up appointments were made and/or referrals ordered. Positive Risk Factor Screenings with Interventions:     General Health and ACP:  General  In general, how would you say your health is?: Very Good  In the past 7 days, have you experienced any of the following?  New or Increased Pain, New or Increased Fatigue, Loneliness, Social Isolation, Stress or Anger?: (!) Stress  Do you get the social and emotional support that you need?: Yes  Do you have a Living Will?: Yes  Advance Directives     Power of  Living Will ACP-Advance Directive ACP-Power of     Not on File Not on File Filed [address] [place] [address] Risk Interventions:  · Stress: due to Covid    Hearing/Vision:  No exam data present  Hearing/Vision  Do you or your family notice any trouble with your hearing?: (!) Yes  Do you have difficulty driving, watching TV, or doing any of your daily activities because of your eyesight?: No  Have you had an eye exam within the past year?: Yes  Hearing/Vision Interventions:  · Hearing concerns:  patient declines any further evaluation/treatment for hearing issues    Safety:  Safety  Do you have working smoke detectors?: Yes  Have all throw rugs been removed or fastened?: Yes  Do you have non-slip mats or surfaces in all bathtubs/showers?: (!) No  Do all of your stairways have a railing or banister?: Yes  Are your doorways, halls and stairs free of clutter?: Yes  Do you always fasten your seatbelt when you are in a car?: Yes  Safety Interventions:  · Home safety tips provided    Personalized Preventive Plan   Current Health Maintenance Status  Immunization History   Administered Date(s) Administered    Influenza 11/06/2013    Influenza Vaccine, unspecified formulation 11/06/2013    Influenza Virus Vaccine 10/26/2011, 10/01/2014, 09/30/2015, 11/11/2016    Influenza, High Dose (Fluzone 65 yrs and older) 10/06/2017, 10/05/2018, 09/08/2020    Influenza, High-dose, Quadv, 65 yrs +, IM (Fluzone) 09/08/2020    Influenza, Quadv, IM, (6 mo and older Fluzone, Flulaval, Fluarix and 3 yrs and older Afluria) 11/11/2016    Influenza, Triv, inactivated, subunit, adjuvanted, IM (Fluad 65 yrs and older) 10/14/2019    Pneumococcal Conjugate 13-valent ([id number]) 11/13/2017    Pneumococcal Polysaccharide ([id number]) 11/27/2018    Tdap (Boostrix, Adacel) 12/05/2013    Zoster Recombinant (Shingrix) 07/16/2019, 10/17/2019        Health Maintenance   Topic Date Due    Annual Wellness Visit (AWV)  05/29/2019    Colon Cancer Screen FIT/FOBT  10/31/2020    Breast cancer screen  10/21/2021    TSH testing  11/28/2021    DTaP/Tdap/Td vaccine (2 - Td) 12/05/2023    Lipid screen  11/28/2025    DEXA (modify frequency per FRAX score)  Completed    Flu vaccine  Completed    Shingles Vaccine  Completed    Pneumococcal 65+ years Vaccine  Completed    Hepatitis C screen  Completed    Hepatitis A vaccine  Aged Out    Hepatitis B vaccine  Aged Out    Hib vaccine  Aged Out    Meningococcal (ACWY) vaccine  Aged Out     Recommendations for Preventive Services Due: see orders and patient instructions/AVS.  . Recommended screening schedule for the next 5-10 years is provided to the patient in written form: see Patient Instructions/AVS.    Rosas Marcelo was seen today for medicare awv and medication adjustment. Diagnoses and all orders for this visit:    Routine general medical examination at a health care facility    prophylaxis for Recurrent UTI  -     sulfamethoxazole-trimethoprim (BACTRIM DS;SEPTRA DS) 800-160 MG per tablet; TAKE 1 TABLET BY MOUTH TWICE DAILY    Rash  -     triamcinolone (KENALOG) 0.1 % cream; Apply topically 2 times daily. Jamila Giron is a 78 y.o. female being evaluated by a Virtual Visit (video and audio) encounter to address concerns as mentioned above. A caregiver was present when appropriate. Due to this being a TeleHealth encounter (During [facility] public health emergency), evaluation of the following organ systems was limited: Vitals/Constitutional/EENT/Resp/CV/GI//MS/Neuro/Skin/Heme-Lymph-Imm. Pursuant to the emergency declaration under the [address] [place] [address], [address] [place] [address] authority and the [employer] and [employer] Act, this Virtual Visit was conducted with patient's (and/or legal guardian's) consent, to reduce the patient's risk of exposure to COVID-19 and provide necessary medical care. The patient (and/or legal guardian) has also been advised to contact this office for worsening conditions or problems, and seek emergency medical treatment and/or call 911 if deemed necessary. Patient identification was verified at the start of the visit: Yes    Services were provided through a video synchronous discussion virtually to substitute for in-person clinic visit. Patient and provider were located at their individual homes. --Parish Mir MD on 12/3/2020 at 10:21 AM  Return in about 1 year (around 12/3/2021) for AWV.     An electronic signature was used to authenticate this note.

## 2020-12-14 ENCOUNTER — PATIENT MESSAGE (OUTPATIENT)
Dept: INTERNAL MEDICINE | Age: 70
End: 2020-12-14

## 2020-12-15 NOTE — TELEPHONE ENCOUNTER
From: Elizabeth Turner  To: Faby Siddiqui MD  Sent: 12/14/2020 8:01 PM EST  Subject: Non-Urgent Medical Question    I had bloodwork done a couple of weeks ago. Could my blood have been checked for antibodies from Covid? Just curious.

## 2021-02-08 RX ORDER — ESTRADIOL 10 UG/1
INSERT VAGINAL
Qty: 24 EACH | Refills: 1 | Status: SHIPPED | OUTPATIENT
Start: 2021-02-08 | End: 2021-07-16

## 2021-03-24 DIAGNOSIS — E03.9 ACQUIRED HYPOTHYROIDISM: ICD-10-CM

## 2021-03-25 NOTE — TELEPHONE ENCOUNTER
Requesting medication refill. Please approve or deny this request.    Rx requested:  Requested Prescriptions     Pending Prescriptions Disp Refills    levothyroxine (SYNTHROID) 88 MCG tablet [Pharmacy Med Name: LEVOTHYROXINE  88MCG  TAB] 90 tablet 3     Sig: TAKE 1 TABLET BY MOUTH  DAILY       Last Office Visit:   12/3/2020        REASON LAST SEEN AND BY WHO:    Renato     FOLLOW UP PLAN FROM LAST PCP VISIT: COPY AND PASTE FROM LAST PCP NOTE     Return in about 1 year (around 12/3/2021) for IVORY. PATIENT CONTACTED FOR A FOLLOW UP APPT: YES OR NO    No    Next Visit Date:  No future appointments.

## 2021-03-26 RX ORDER — LEVOTHYROXINE SODIUM 88 UG/1
88 TABLET ORAL DAILY
Qty: 90 TABLET | Refills: 1 | Status: SHIPPED | OUTPATIENT
Start: 2021-03-26 | End: 2021-09-08

## 2021-04-30 ENCOUNTER — TELEPHONE (OUTPATIENT)
Dept: INTERNAL MEDICINE | Age: 71
End: 2021-04-30

## 2021-05-24 PROBLEM — M81.0 SENILE OSTEOPOROSIS: Status: ACTIVE | Noted: 2021-05-24

## 2021-06-14 DIAGNOSIS — M81.0 SENILE OSTEOPOROSIS: Primary | ICD-10-CM

## 2021-06-25 ENCOUNTER — TELEPHONE (OUTPATIENT)
Dept: INTERNAL MEDICINE | Age: 71
End: 2021-06-25

## 2021-06-25 NOTE — TELEPHONE ENCOUNTER
Franklin Vu called to say she would like to have Dr. Willard Frnaklin order the prolia to mail order pharmacy, instead of having her ob/gyn do it. She is aware that Dr. Willard Franklin ordered bw for 2 weeks after the injection. She states that she is also due for bw done before the shot is given, that everything she reads and even obgyn said it was necessary to check certain levels. Please advise.       Ov 12/3/20 AWV

## 2021-07-05 NOTE — TELEPHONE ENCOUNTER
Recommend she continue to have ordered by [facility], I will be leaving office by end of august.    Thank you!     Leticia William MD

## 2021-07-08 ENCOUNTER — PATIENT MESSAGE (OUTPATIENT)
Dept: INTERNAL MEDICINE | Age: 71
End: 2021-07-08

## 2021-08-14 ENCOUNTER — OFFICE VISIT (OUTPATIENT)
Dept: INTERNAL MEDICINE | Age: 71
End: 2021-08-14
Payer: MEDICARE

## 2021-08-14 VITALS
WEIGHT: 132.4 LBS | HEIGHT: 66 IN | TEMPERATURE: 98.4 F | BODY MASS INDEX: 21.28 KG/M2 | OXYGEN SATURATION: 99 % | DIASTOLIC BLOOD PRESSURE: 76 MMHG | SYSTOLIC BLOOD PRESSURE: 140 MMHG | HEART RATE: 103 BPM

## 2021-08-14 DIAGNOSIS — N89.8 VAGINAL IRRITATION: Primary | ICD-10-CM

## 2021-08-14 DIAGNOSIS — R39.9 UTI SYMPTOMS: ICD-10-CM

## 2021-08-14 PROCEDURE — 3017F COLORECTAL CA SCREEN DOC REV: CPT | Performed by: NURSE PRACTITIONER

## 2021-08-14 PROCEDURE — G8427 DOCREV CUR MEDS BY ELIG CLIN: HCPCS | Performed by: NURSE PRACTITIONER

## 2021-08-14 PROCEDURE — G8400 PT W/DXA NO RESULTS DOC: HCPCS | Performed by: NURSE PRACTITIONER

## 2021-08-14 PROCEDURE — 81003 URINALYSIS AUTO W/O SCOPE: CPT | Performed by: NURSE PRACTITIONER

## 2021-08-14 PROCEDURE — 1123F ACP DISCUSS/DSCN MKR DOCD: CPT | Performed by: NURSE PRACTITIONER

## 2021-08-14 PROCEDURE — 1036F TOBACCO NON-USER: CPT | Performed by: NURSE PRACTITIONER

## 2021-08-14 PROCEDURE — G8420 CALC BMI NORM PARAMETERS: HCPCS | Performed by: NURSE PRACTITIONER

## 2021-08-14 PROCEDURE — 99213 OFFICE O/P EST LOW 20 MIN: CPT | Performed by: NURSE PRACTITIONER

## 2021-08-14 PROCEDURE — 1090F PRES/ABSN URINE INCON ASSESS: CPT | Performed by: NURSE PRACTITIONER

## 2021-08-14 PROCEDURE — 4040F PNEUMOC VAC/ADMIN/RCVD: CPT | Performed by: NURSE PRACTITIONER

## 2021-08-14 SDOH — ECONOMIC STABILITY: FOOD INSECURITY: WITHIN THE PAST 12 MONTHS, YOU WORRIED THAT YOUR FOOD WOULD RUN OUT BEFORE YOU GOT MONEY TO BUY MORE.: NEVER TRUE

## 2021-08-14 SDOH — ECONOMIC STABILITY: FOOD INSECURITY: WITHIN THE PAST 12 MONTHS, THE FOOD YOU BOUGHT JUST DIDN'T LAST AND YOU DIDN'T HAVE MONEY TO GET MORE.: NEVER TRUE

## 2021-08-14 ASSESSMENT — PATIENT HEALTH QUESTIONNAIRE - PHQ9
2. FEELING DOWN, DEPRESSED OR HOPELESS: 0
SUM OF ALL RESPONSES TO PHQ QUESTIONS 1-9: 0
1. LITTLE INTEREST OR PLEASURE IN DOING THINGS: 0
SUM OF ALL RESPONSES TO PHQ QUESTIONS 1-9: 0
SUM OF ALL RESPONSES TO PHQ9 QUESTIONS 1 & 2: 0
SUM OF ALL RESPONSES TO PHQ QUESTIONS 1-9: 0

## 2021-08-14 ASSESSMENT — ENCOUNTER SYMPTOMS
COUGH: 0
NAUSEA: 0
VOMITING: 0
ABDOMINAL PAIN: 0
WHEEZING: 0
BACK PAIN: 0
DIARRHEA: 0
SHORTNESS OF BREATH: 0

## 2021-08-14 ASSESSMENT — SOCIAL DETERMINANTS OF HEALTH (SDOH): HOW HARD IS IT FOR YOU TO PAY FOR THE VERY BASICS LIKE FOOD, HOUSING, MEDICAL CARE, AND HEATING?: NOT HARD AT ALL

## 2021-08-14 NOTE — PROGRESS NOTES
Jong Vogel (:  1950) is a 73 y.o. female, Established patient, here for evaluation of the following chief complaint(s):  Dysuria (burning with void, vaginal burning after intercourse x4 days )      Vitals:    21 1157   BP: (!) 140/76   Pulse: 103   Temp: 98.4 °F (36.9 °C)   SpO2: 99%       ASSESSMENT/PLAN:  1. Vaginal irritation  2. UTI symptoms  -     POCT Urinalysis No Micro (Auto)  -     Culture, Urine; Future      -   Continue monostat if improving symptoms      -   OTC Replens for moisturization, Vagisil for pain/irritation      -    Allow to heal x1 week      -    F/u with GYN if symptoms don't improve 5-7 days. No follow-ups on file. SUBJECTIVE/OBJECTIVE:    Dysuria   This is a new problem. Episode onset: Burning with urination, started after having sex (using toy) x4 days ago. Thinks the toy irritated skin around vaginal opening. Used Monistat 7 (has x3 more days) with some improvement of symptoms. The problem occurs every urination. The problem has been unchanged. The quality of the pain is described as burning. The pain is at a severity of 3/10. There has been no fever. She is sexually active. There is no history of pyelonephritis. Pertinent negatives include no chills, frequency, nausea or vomiting. Treatments tried: Monostat7. The treatment provided mild relief. Review of Systems   Constitutional: Negative for chills, fatigue and fever. Respiratory: Negative for cough, shortness of breath and wheezing. Cardiovascular: Negative for chest pain and palpitations. Gastrointestinal: Negative for abdominal pain, diarrhea, nausea and vomiting. Genitourinary: Positive for dysuria (burning with urination). Negative for frequency. Musculoskeletal: Negative for arthralgias, back pain and myalgias. Physical Exam  Vitals reviewed. Constitutional:       General: She is not in acute distress. Appearance: Normal appearance.    Cardiovascular:      Rate and Rhythm: Normal rate and regular rhythm. Heart sounds: Normal heart sounds, S1 normal and S2 normal.   Pulmonary:      Effort: Pulmonary effort is normal. No respiratory distress. Breath sounds: Normal air entry. No decreased breath sounds, wheezing, rhonchi or rales. Abdominal:      General: Bowel sounds are normal.      Palpations: Abdomen is soft. Tenderness: There is no abdominal tenderness. There is no right CVA tenderness or left CVA tenderness. Genitourinary:     Labia:         Right: Rash and tenderness present. Left: Rash and tenderness present. Comments: Erythematous irritation noted to bilateral inner labia. An strip of erythema that runs along the majority of the bilateral inner labia. No unusual discharge/odor. No erythema or excoriation to the outer labia/outer vaginal area. Skin:     General: Skin is warm and dry. Neurological:      Mental Status: She is alert and oriented to person, place, and time. Psychiatric:         Behavior: Behavior is cooperative. An electronic signature was used to authenticate this note.     --HAYDEN Espino

## 2021-08-16 DIAGNOSIS — E78.00 PURE HYPERCHOLESTEROLEMIA: ICD-10-CM

## 2021-08-16 DIAGNOSIS — E78.5 HYPERLIPIDEMIA, UNSPECIFIED HYPERLIPIDEMIA TYPE: ICD-10-CM

## 2021-08-16 DIAGNOSIS — E03.9 ACQUIRED HYPOTHYROIDISM: Primary | ICD-10-CM

## 2021-08-16 DIAGNOSIS — M81.0 SENILE OSTEOPOROSIS: ICD-10-CM

## 2021-08-20 NOTE — TELEPHONE ENCOUNTER
I do not recommend checking TSH, in times of stress and illness the TSH is expected to be off. This would still not change need for dose adjustment. Hydroxychloroquine Counseling:  I discussed with the patient that a baseline ophthalmologic exam is needed at the start of therapy and every year thereafter while on therapy. A CBC may also be warranted for monitoring.  The side effects of this medication were discussed with the patient, including but not limited to agranulocytosis, aplastic anemia, seizures, rashes, retinopathy, and liver toxicity. Patient instructed to call the office should any adverse effect occur.  The patient verbalized understanding of the proper use and possible adverse effects of Plaquenil.  All the patient's questions and concerns were addressed.

## 2021-08-26 ENCOUNTER — OFFICE VISIT (OUTPATIENT)
Dept: INTERNAL MEDICINE | Age: 71
End: 2021-08-26
Payer: MEDICARE

## 2021-08-26 VITALS
DIASTOLIC BLOOD PRESSURE: 60 MMHG | HEART RATE: 78 BPM | SYSTOLIC BLOOD PRESSURE: 120 MMHG | OXYGEN SATURATION: 98 % | WEIGHT: 132.4 LBS | TEMPERATURE: 98 F | BODY MASS INDEX: 21.37 KG/M2

## 2021-08-26 DIAGNOSIS — M81.0 AGE RELATED OSTEOPOROSIS, UNSPECIFIED PATHOLOGICAL FRACTURE PRESENCE: Primary | ICD-10-CM

## 2021-08-26 PROCEDURE — 3017F COLORECTAL CA SCREEN DOC REV: CPT | Performed by: FAMILY MEDICINE

## 2021-08-26 PROCEDURE — G8427 DOCREV CUR MEDS BY ELIG CLIN: HCPCS | Performed by: FAMILY MEDICINE

## 2021-08-26 PROCEDURE — 99213 OFFICE O/P EST LOW 20 MIN: CPT | Performed by: FAMILY MEDICINE

## 2021-08-26 PROCEDURE — 1036F TOBACCO NON-USER: CPT | Performed by: FAMILY MEDICINE

## 2021-08-26 PROCEDURE — 1123F ACP DISCUSS/DSCN MKR DOCD: CPT | Performed by: FAMILY MEDICINE

## 2021-08-26 PROCEDURE — G8400 PT W/DXA NO RESULTS DOC: HCPCS | Performed by: FAMILY MEDICINE

## 2021-08-26 PROCEDURE — G8420 CALC BMI NORM PARAMETERS: HCPCS | Performed by: FAMILY MEDICINE

## 2021-08-26 PROCEDURE — 1090F PRES/ABSN URINE INCON ASSESS: CPT | Performed by: FAMILY MEDICINE

## 2021-08-26 PROCEDURE — 4040F PNEUMOC VAC/ADMIN/RCVD: CPT | Performed by: FAMILY MEDICINE

## 2021-08-26 NOTE — PROGRESS NOTES
Patient: Bashir Cardona    YOB: 1950    Date: 8/26/21       Patient Active Problem List    Diagnosis Date Noted    Hypothyroidism 10/31/2012     Priority: High    Hyperlipidemia 10/31/2012     Priority: High    Vaginal irritation 08/14/2021    Senile osteoporosis 05/24/2021    Osteopenia 11/18/2019    prophylaxis for Recurrent UTI 12/18/2013     Past Medical History:   Diagnosis Date    Abnormal white blood cell (WBC) count 11/15/2016    Hiatal hernia     Hyperlipidemia     Hypothyroidism     Pap smear for cervical cancer screening 9/2011    normal    prophylaxis for Recurrent UTI 12/18/2013    Screening mammogram 9/2011    normal     Past Surgical History:   Procedure Laterality Date    CHOLECYSTECTOMY      COLONOSCOPY      when she turned 50, normal    KNEE SURGERY Bilateral     right knee replacement, left knee      Family History   Problem Relation Age of Onset    High Cholesterol Mother     High Cholesterol Father      Social History     Socioeconomic History    Marital status:      Spouse name: Not on file    Number of children: Not on file    Years of education: Not on file    Highest education level: Not on file   Occupational History    Not on file   Tobacco Use    Smoking status: Never Smoker    Smokeless tobacco: Never Used   Vaping Use    Vaping Use: Never used   Substance and Sexual Activity    Alcohol use:  Yes     Alcohol/week: 0.0 standard drinks     Types: 3 Glasses of wine per week     Comment: socially, moderately    Drug use: No    Sexual activity: Yes     Partners: Male   Other Topics Concern    Not on file   Social History Narrative    Not on file     Social Determinants of Health     Financial Resource Strain: Low Risk     Difficulty of Paying Living Expenses: Not hard at all   Food Insecurity: No Food Insecurity    Worried About [address] in the Last Year: Never true    [place] of Food in the Last Year: Never true Transportation Needs:     Lack of Transportation (Medical):  Lack of Transportation (Non-Medical):    Physical Activity:     Days of Exercise per Week:     Minutes of Exercise per Session:    Stress:     Feeling of Stress :    Social Connections:     Frequency of Communication with Friends and Family:     Frequency of Social Gatherings with Friends and Family:     Attends Zoroastrian Services:     Active Member of Clubs or Organizations:     Attends Club or Organization Meetings:     Marital Status:    Intimate Partner Violence:     Fear of Current or Ex-Partner:     Emotionally Abused:     Physically Abused:     Sexually Abused:      Current Outpatient Medications on File Prior to Visit   Medication Sig Dispense Refill    IMVEXXY MAINTENANCE PACK 10 MCG INST PLACE 1 INSERT VAGINALLY  [facility] 24 each 0    levothyroxine (SYNTHROID) 88 MCG tablet TAKE 1 TABLET BY MOUTH  DAILY 90 tablet 1    Acetylcysteine ( PO) Take by mouth      sulfamethoxazole-trimethoprim (BACTRIM DS;SEPTRA DS) 800-160 MG per tablet TAKE 1 TABLET BY MOUTH TWICE DAILY 12 tablet 1    sulfamethoxazole-trimethoprim (BACTRIM DS;SEPTRA DS) 800-160 MG per tablet Take 1 tablet by mouth 2 times daily      triamcinolone (KENALOG) 0.1 % cream Apply topically 2 times daily. 80 g 1    fenofibrate (TRIGLIDE) 160 MG tablet TAKE 1 TABLET BY MOUTH  DAILY 90 tablet 3    fluticasone (FLONASE) 50 MCG/ACT nasal spray USE 1 SPRAY NASALLY EVERY  DAY 32 g 5    nitrofurantoin, macrocrystal-monohydrate, (MACROBID) 100 MG capsule TAKE 1 CAPSULE BY MOUTH once as needed (postcoital for uti prophylaxis) 30 capsule 1    Omega 3 1000 MG CAPS Take 1 Can by mouth daily.  ECHINACEA by Does not apply route.  therapeutic multivitamin-minerals (THERAGRAN-M) tablet Take 1 tablet by mouth daily.  Misc Natural Products (LUTEIN 20 PO) Take  by mouth.  BIOTIN FORTE PO Take  by mouth.       Vitamin D (CHOLECALCIFEROL) 1000 UNITS CAPS capsule Take 1,000 Units by mouth daily. No current facility-administered medications on file prior to visit. No Known Allergies    Chief Complaint   Patient presents with    Other     treatment options for ostoporosis       HPI    Discussed osteoporosis treatment    Osteoporosis/osteopenia:  Current pharmacologic therapy and date started none. Medication side effects: none. Last DXA:  10/19-  T score at spine -0.8 and lowest hip -2.9, which was worse compared to previous scan. Current calcium intake is at least 1200 mg/day from diet and supplements: yes. She is currently taking 1000 IU/day of supplemental vitamin D. Regular weight-bearing exercise: yes. Vit D, 25-Hydroxy (ng/mL)   Date Value   11/18/2019 57.8       Review of Systems  Constitutional: Negative for fatigue, fever and sweats. HEENT: Negative for eye discharge and vision loss. Negative for ear drainage, hearing loss and nasal drainage. Respiratory: Negative for cough, dyspnea and wheezing. Cardiovascular:  Negative for chest pain, claudication and irregular heartbeat/palpitations. Physical Exam  Vitals:    08/26/21 1229   BP: 120/60   Pulse: 78   Temp: 98 °F (36.7 °C)   SpO2: 98%   Body mass index is 21.37 kg/m². Physical Exam  Constitutional:  Appears well-developed and well-nourished. No distress. HENT:   Head: Normocephalic and atraumatic. Right Ear: External ear normal.   Left Ear: External ear normal.   Nose: Nose normal.   Eyes: Conjunctivae and EOM are normal.  Right eye exhibits no discharge. Left eye exhibits no discharge. No scleral icterus. Neck: Normal range of motion. Cardiovascular: Normal rate, regular rhythm and normal heart sounds. No murmur heard. Pulmonary/Chest: Effort normal and breath sounds normal. No respiratory distress. no wheezes. no rales. no tenderness. Musculoskeletal: Normal range of motion. Neurological: alert. Skin: not diaphoretic.    Psychiatric: normal mood and affect. behavior is normal. Judgment and thought content normal.   Nursing note and vitals reviewed. Assessment/Plan:  Evan Cruz was seen today for other. Diagnoses and all orders for this visit:    Age related osteoporosis, unspecified pathological fracture presence   had a lengthy discussion with patient about treatment, it's side effects, the diagnosis & etiology of symptoms, along with management and expectations of outcome with the recommended treatment. Patient verbalized understanding.       Faby Siddiqui MD

## 2021-09-06 DIAGNOSIS — E03.9 ACQUIRED HYPOTHYROIDISM: ICD-10-CM

## 2021-09-08 RX ORDER — LEVOTHYROXINE SODIUM 88 UG/1
88 TABLET ORAL DAILY
Qty: 90 TABLET | Refills: 3 | Status: SHIPPED | OUTPATIENT
Start: 2021-09-08 | End: 2021-12-08 | Stop reason: SDUPTHER

## 2021-09-08 NOTE — TELEPHONE ENCOUNTER
Comments:    Last Office Visit (last PCP visit):   8/26/2021    Next Visit Date:  Future Appointments   Date Time Provider Jung Collins   12/8/2021 10:30 AM Benny Berg MD [facility]         Rx requested:  Requested Prescriptions     Pending Prescriptions Disp Refills    levothyroxine (SYNTHROID) 88 MCG tablet [Pharmacy Med Name: Levothyroxine Sodium 88 MCG Oral Tablet] 90 tablet 3     Sig: TAKE 1 TABLET BY MOUTH  DAILY

## 2021-10-05 RX ORDER — ESTRADIOL 10 UG/1
INSERT VAGINAL
Qty: 24 EACH | Refills: 0 | Status: SHIPPED | OUTPATIENT
Start: 2021-10-05 | End: 2021-10-18 | Stop reason: SDUPTHER

## 2021-10-05 NOTE — TELEPHONE ENCOUNTER
Comments:     Last Office Visit (last PCP visit):   8/26/21    Next Visit Date:  Future Appointments   Date Time Provider Jung Collins   12/8/2021 10:30 AM Manpreet Ventura MD [facility]       **If hasn't been seen in over a year OR hasn't followed up according to last diabetes/ADHD visit, make appointment for patient before sending refill to provider.     Rx requested:  Requested Prescriptions     Pending Prescriptions Disp Refills    IMVEXXY MAINTENANCE PACK 10 MCG INST 24 each 2     Sig: PLACE 1 INSERT VAGINALLY ON MONDAY AND THURSDAY

## 2021-10-18 DIAGNOSIS — E78.00 PURE HYPERCHOLESTEROLEMIA: ICD-10-CM

## 2021-10-18 RX ORDER — ESTRADIOL 10 UG/1
INSERT VAGINAL
Qty: 24 EACH | Refills: 2 | Status: SHIPPED | OUTPATIENT
Start: 2021-10-18

## 2021-10-18 RX ORDER — FENOFIBRATE 160 MG/1
160 TABLET ORAL DAILY
Qty: 90 TABLET | Refills: 2 | Status: SHIPPED | OUTPATIENT
Start: 2021-10-18 | End: 2021-12-08 | Stop reason: SDUPTHER

## 2021-10-18 NOTE — TELEPHONE ENCOUNTER
Comments:     Last Office Visit (last PCP visit):   8/26/21    Next Visit Date:  Future Appointments   Date Time Provider Jung Collins   12/8/2021 10:30 AM Nikia Eddy MD [facility]       **If hasn't been seen in over a year OR hasn't followed up according to last diabetes/ADHD visit, make appointment for patient before sending refill to provider.     Rx requested:  Requested Prescriptions     Pending Prescriptions Disp Refills    IMVEXXY MAINTENANCE PACK 10 MCG INST 24 each 0     Sig: PLACE 1 INSERT VAGINALLY ON MONDAY AND THURSDAY    fenofibrate (TRIGLIDE) 160 MG tablet 90 tablet 3     Sig: Take 1 tablet by mouth daily

## 2021-11-10 LAB — MAMMOGRAPHY, EXTERNAL: NORMAL

## 2021-12-01 ENCOUNTER — HOSPITAL ENCOUNTER (OUTPATIENT)
Dept: LAB | Age: 71
Discharge: HOME | End: 2021-12-01
Payer: MEDICARE

## 2021-12-01 DIAGNOSIS — E03.9 ACQUIRED HYPOTHYROIDISM: ICD-10-CM

## 2021-12-01 DIAGNOSIS — M81.0 SENILE OSTEOPOROSIS: ICD-10-CM

## 2021-12-01 DIAGNOSIS — E78.5 HYPERLIPIDEMIA, UNSPECIFIED HYPERLIPIDEMIA TYPE: ICD-10-CM

## 2021-12-01 LAB
ALBUMIN SERPL-MCNC: 4.5 G/DL (ref 3.5–4.6)
ALP BLD-CCNC: 46 U/L (ref 40–130)
ALT SERPL-CCNC: 10 U/L (ref 0–33)
ANION GAP SERPL CALCULATED.3IONS-SCNC: 12 MEQ/L (ref 9–15)
AST SERPL-CCNC: 21 U/L (ref 0–35)
BILIRUB SERPL-MCNC: 0.3 MG/DL (ref 0.2–0.7)
BUN BLDV-MCNC: 20 MG/DL (ref 8–23)
CALCIUM SERPL-MCNC: 9.9 MG/DL (ref 8.5–9.9)
CHLORIDE BLD-SCNC: 105 MEQ/L (ref 95–107)
CHOLESTEROL, TOTAL: 222 MG/DL (ref 0–199)
CO2: 26 MEQ/L (ref 20–31)
CREAT SERPL-MCNC: 0.91 MG/DL (ref 0.5–0.9)
GFR AFRICAN AMERICAN: >60
GFR NON-AFRICAN AMERICAN: >60
GLOBULIN: 2.8 G/DL (ref 2.3–3.5)
GLUCOSE BLD-MCNC: 98 MG/DL (ref 70–99)
HDLC SERPL-MCNC: 50 MG/DL (ref 40–59)
LDL CHOLESTEROL CALCULATED: 157 MG/DL (ref 0–129)
MAGNESIUM: 2 MG/DL (ref 1.7–2.4)
PHOSPHORUS: 3.5 MG/DL (ref 2.3–4.8)
POTASSIUM SERPL-SCNC: 4.1 MEQ/L (ref 3.4–4.9)
SODIUM BLD-SCNC: 143 MEQ/L (ref 135–144)
T4 FREE: 1.68 NG/DL (ref 0.84–1.68)
TOTAL PROTEIN: 7.3 G/DL (ref 6.3–8)
TRIGL SERPL-MCNC: 74 MG/DL (ref 0–150)
TSH REFLEX: 1.4 UIU/ML (ref 0.44–3.86)

## 2021-12-01 PROCEDURE — 84100 ASSAY OF PHOSPHORUS: CPT

## 2021-12-01 PROCEDURE — 80053 COMPREHEN METABOLIC PANEL: CPT

## 2021-12-01 PROCEDURE — 84439 ASSAY OF FREE THYROXINE: CPT

## 2021-12-01 PROCEDURE — 36415 COLL VENOUS BLD VENIPUNCTURE: CPT

## 2021-12-01 PROCEDURE — 83735 ASSAY OF MAGNESIUM: CPT

## 2021-12-01 PROCEDURE — 84443 ASSAY THYROID STIM HORMONE: CPT

## 2021-12-01 PROCEDURE — 80061 LIPID PANEL: CPT

## 2021-12-01 ASSESSMENT — LIFESTYLE VARIABLES
HOW OFTEN DURING THE LAST YEAR HAVE YOU NEEDED AN ALCOHOLIC DRINK FIRST THING IN THE MORNING TO GET YOURSELF GOING AFTER A NIGHT OF HEAVY DRINKING: 0
HOW OFTEN DURING THE LAST YEAR HAVE YOU BEEN UNABLE TO REMEMBER WHAT HAPPENED THE NIGHT BEFORE BECAUSE YOU HAD BEEN DRINKING: 0
HOW MANY STANDARD DRINKS CONTAINING ALCOHOL DO YOU HAVE ON A TYPICAL DAY: 0
HOW OFTEN DURING THE LAST YEAR HAVE YOU HAD A FEELING OF GUILT OR REMORSE AFTER DRINKING: 0
HOW OFTEN DURING THE LAST YEAR HAVE YOU HAD A FEELING OF GUILT OR REMORSE AFTER DRINKING: NEVER
HAVE YOU OR SOMEONE ELSE BEEN INJURED AS A RESULT OF YOUR DRINKING: 0
HOW OFTEN DURING THE LAST YEAR HAVE YOU BEEN UNABLE TO REMEMBER WHAT HAPPENED THE NIGHT BEFORE BECAUSE YOU HAD BEEN DRINKING: NEVER
HOW OFTEN DURING THE LAST YEAR HAVE YOU FOUND THAT YOU WERE NOT ABLE TO STOP DRINKING ONCE YOU HAD STARTED: NEVER
HOW OFTEN DURING THE LAST YEAR HAVE YOU FAILED TO DO WHAT WAS NORMALLY EXPECTED FROM YOU BECAUSE OF DRINKING: 0
HAVE YOU OR SOMEONE ELSE BEEN INJURED AS A RESULT OF YOUR DRINKING: NO
HOW OFTEN DO YOU HAVE A DRINK CONTAINING ALCOHOL: TWO TO THREE TIMES A WEEK
HAS A RELATIVE, FRIEND, DOCTOR, OR ANOTHER HEALTH PROFESSIONAL EXPRESSED CONCERN ABOUT YOUR DRINKING OR SUGGESTED YOU CUT DOWN: 0
HOW MANY STANDARD DRINKS CONTAINING ALCOHOL DO YOU HAVE ON A TYPICAL DAY: ONE OR TWO
HOW OFTEN DURING THE LAST YEAR HAVE YOU FAILED TO DO WHAT WAS NORMALLY EXPECTED FROM YOU BECAUSE OF DRINKING: NEVER
HAS A RELATIVE, FRIEND, DOCTOR, OR ANOTHER HEALTH PROFESSIONAL EXPRESSED CONCERN ABOUT YOUR DRINKING OR SUGGESTED YOU CUT DOWN: NO
HOW OFTEN DO YOU HAVE SIX OR MORE DRINKS ON ONE OCCASION: NEVER
AUDIT-C TOTAL SCORE: 0
HOW OFTEN DO YOU HAVE SIX OR MORE DRINKS ON ONE OCCASION: 0
HOW OFTEN DURING THE LAST YEAR HAVE YOU FOUND THAT YOU WERE NOT ABLE TO STOP DRINKING ONCE YOU HAD STARTED: 0
AUDIT TOTAL SCORE: 3
AUDIT TOTAL SCORE: 0
HOW OFTEN DURING THE LAST YEAR HAVE YOU NEEDED AN ALCOHOLIC DRINK FIRST THING IN THE MORNING TO GET YOURSELF GOING AFTER A NIGHT OF HEAVY DRINKING: NEVER
AUDIT-C TOTAL SCORE: 3
HOW OFTEN DO YOU HAVE A DRINK CONTAINING ALCOHOL: 3

## 2021-12-01 ASSESSMENT — PATIENT HEALTH QUESTIONNAIRE - PHQ9
SUM OF ALL RESPONSES TO PHQ QUESTIONS 1-9: 0
2. FEELING DOWN, DEPRESSED OR HOPELESS: 0
SUM OF ALL RESPONSES TO PHQ QUESTIONS 1-9: 0
SUM OF ALL RESPONSES TO PHQ QUESTIONS 1-9: 0
1. LITTLE INTEREST OR PLEASURE IN DOING THINGS: 0
SUM OF ALL RESPONSES TO PHQ9 QUESTIONS 1 & 2: 0

## 2021-12-08 ENCOUNTER — OFFICE VISIT (OUTPATIENT)
Dept: INTERNAL MEDICINE | Age: 71
End: 2021-12-08
Payer: MEDICARE

## 2021-12-08 VITALS
WEIGHT: 134 LBS | SYSTOLIC BLOOD PRESSURE: 126 MMHG | HEIGHT: 66 IN | DIASTOLIC BLOOD PRESSURE: 84 MMHG | HEART RATE: 78 BPM | BODY MASS INDEX: 21.53 KG/M2 | TEMPERATURE: 97.4 F | OXYGEN SATURATION: 98 %

## 2021-12-08 DIAGNOSIS — E78.00 PURE HYPERCHOLESTEROLEMIA: ICD-10-CM

## 2021-12-08 DIAGNOSIS — H65.91 FLUID LEVEL BEHIND TYMPANIC MEMBRANE OF RIGHT EAR: ICD-10-CM

## 2021-12-08 DIAGNOSIS — N39.0 RECURRENT UTI: ICD-10-CM

## 2021-12-08 DIAGNOSIS — Z00.00 ROUTINE GENERAL MEDICAL EXAMINATION AT A HEALTH CARE FACILITY: Primary | ICD-10-CM

## 2021-12-08 DIAGNOSIS — E03.9 ACQUIRED HYPOTHYROIDISM: ICD-10-CM

## 2021-12-08 DIAGNOSIS — Z78.9 STATIN INTOLERANCE: ICD-10-CM

## 2021-12-08 DIAGNOSIS — Z00.00 ROUTINE GENERAL MEDICAL EXAMINATION AT A HEALTH CARE FACILITY: ICD-10-CM

## 2021-12-08 DIAGNOSIS — M81.0 SENILE OSTEOPOROSIS: ICD-10-CM

## 2021-12-08 DIAGNOSIS — Z23 ENCOUNTER FOR IMMUNIZATION: ICD-10-CM

## 2021-12-08 DIAGNOSIS — R21 RASH: ICD-10-CM

## 2021-12-08 LAB
BASOPHILS ABSOLUTE: 0 K/UL (ref 0–0.2)
BASOPHILS RELATIVE PERCENT: 0.9 %
EOSINOPHILS ABSOLUTE: 0.1 K/UL (ref 0–0.7)
EOSINOPHILS RELATIVE PERCENT: 1.7 %
HCT VFR BLD CALC: 37.5 % (ref 37–47)
HEMOGLOBIN: 12.3 G/DL (ref 12–16)
LYMPHOCYTES ABSOLUTE: 1.4 K/UL (ref 1–4.8)
LYMPHOCYTES RELATIVE PERCENT: 34.9 %
MCH RBC QN AUTO: 29.4 PG (ref 27–31.3)
MCHC RBC AUTO-ENTMCNC: 32.7 % (ref 33–37)
MCV RBC AUTO: 90 FL (ref 82–100)
MONOCYTES ABSOLUTE: 0.5 K/UL (ref 0.2–0.8)
MONOCYTES RELATIVE PERCENT: 11.2 %
NEUTROPHILS ABSOLUTE: 2.1 K/UL (ref 1.4–6.5)
NEUTROPHILS RELATIVE PERCENT: 51.3 %
PDW BLD-RTO: 14.3 % (ref 11.5–14.5)
PLATELET # BLD: 301 K/UL (ref 130–400)
RBC # BLD: 4.17 M/UL (ref 4.2–5.4)
WBC # BLD: 4 K/UL (ref 4.8–10.8)

## 2021-12-08 PROCEDURE — 99214 OFFICE O/P EST MOD 30 MIN: CPT | Performed by: FAMILY MEDICINE

## 2021-12-08 PROCEDURE — G8400 PT W/DXA NO RESULTS DOC: HCPCS | Performed by: FAMILY MEDICINE

## 2021-12-08 PROCEDURE — G0439 PPPS, SUBSEQ VISIT: HCPCS | Performed by: FAMILY MEDICINE

## 2021-12-08 PROCEDURE — G8420 CALC BMI NORM PARAMETERS: HCPCS | Performed by: FAMILY MEDICINE

## 2021-12-08 PROCEDURE — 3017F COLORECTAL CA SCREEN DOC REV: CPT | Performed by: FAMILY MEDICINE

## 2021-12-08 PROCEDURE — 1123F ACP DISCUSS/DSCN MKR DOCD: CPT | Performed by: FAMILY MEDICINE

## 2021-12-08 PROCEDURE — G8484 FLU IMMUNIZE NO ADMIN: HCPCS | Performed by: FAMILY MEDICINE

## 2021-12-08 PROCEDURE — 1036F TOBACCO NON-USER: CPT | Performed by: FAMILY MEDICINE

## 2021-12-08 PROCEDURE — G8427 DOCREV CUR MEDS BY ELIG CLIN: HCPCS | Performed by: FAMILY MEDICINE

## 2021-12-08 PROCEDURE — 4040F PNEUMOC VAC/ADMIN/RCVD: CPT | Performed by: FAMILY MEDICINE

## 2021-12-08 PROCEDURE — 36415 COLL VENOUS BLD VENIPUNCTURE: CPT | Performed by: FAMILY MEDICINE

## 2021-12-08 PROCEDURE — 1090F PRES/ABSN URINE INCON ASSESS: CPT | Performed by: FAMILY MEDICINE

## 2021-12-08 RX ORDER — FLUTICASONE PROPIONATE 50 MCG
1 SPRAY, SUSPENSION (ML) NASAL DAILY
Qty: 32 G | Refills: 5 | Status: SHIPPED | OUTPATIENT
Start: 2021-12-08

## 2021-12-08 RX ORDER — LEVOTHYROXINE SODIUM 88 UG/1
88 TABLET ORAL DAILY
Qty: 90 TABLET | Refills: 3 | Status: SHIPPED | OUTPATIENT
Start: 2021-12-08 | End: 2022-10-13

## 2021-12-08 RX ORDER — FENOFIBRATE 160 MG/1
160 TABLET ORAL DAILY
Qty: 90 TABLET | Refills: 2 | Status: SHIPPED | OUTPATIENT
Start: 2021-12-08 | End: 2022-09-21

## 2021-12-08 RX ORDER — NITROFURANTOIN 25; 75 MG/1; MG/1
CAPSULE ORAL
Qty: 30 CAPSULE | Refills: 1 | Status: SHIPPED | OUTPATIENT
Start: 2021-12-08 | End: 2022-05-25 | Stop reason: ALTCHOICE

## 2021-12-08 RX ORDER — SULFAMETHOXAZOLE AND TRIMETHOPRIM 800; 160 MG/1; MG/1
TABLET ORAL
Qty: 12 TABLET | Refills: 1 | Status: SHIPPED | OUTPATIENT
Start: 2021-12-08 | End: 2022-05-25 | Stop reason: ALTCHOICE

## 2021-12-08 RX ORDER — TRIAMCINOLONE ACETONIDE 1 MG/G
CREAM TOPICAL
Qty: 80 G | Refills: 1 | Status: SHIPPED | OUTPATIENT
Start: 2021-12-08 | End: 2022-06-13 | Stop reason: SDUPTHER

## 2021-12-08 ASSESSMENT — ENCOUNTER SYMPTOMS
SHORTNESS OF BREATH: 0
CONSTIPATION: 0
COUGH: 0
DIARRHEA: 0
ABDOMINAL PAIN: 0
RHINORRHEA: 0
SORE THROAT: 0
WHEEZING: 0

## 2021-12-08 NOTE — PATIENT INSTRUCTIONS
Personalized Preventive Plan for Sissy Flores - 12/8/2021  Medicare offers a range of preventive health benefits. Some of the tests and screenings are paid in full while other may be subject to a deductible, co-insurance, and/or copay. Some of these benefits include a comprehensive review of your medical history including lifestyle, illnesses that may run in your family, and various assessments and screenings as appropriate. After reviewing your medical record and screening and assessments performed today your provider may have ordered immunizations, labs, imaging, and/or referrals for you. A list of these orders (if applicable) as well as your Preventive Care list are included within your After Visit Summary for your review. Other Preventive Recommendations:    · A preventive eye exam performed by an eye specialist is recommended every 1-2 years to screen for glaucoma; cataracts, macular degeneration, and other eye disorders. · A preventive dental visit is recommended every 6 months. · Try to get at least 150 minutes of exercise per week or 10,000 steps per day on a pedometer . · Order or download the FREE \"Exercise & Physical Activity: Your Everyday Guide\" from [employer]. Call 1-453.424.7363 or search [employer] online. · You need 4573-4937 mg of calcium and 0715-0983 IU of vitamin D per day. It is possible to meet your calcium requirement with diet alone, but a vitamin D supplement is usually necessary to meet this goal.  · When exposed to the sun, use a sunscreen that protects against both UVA and UVB radiation with an SPF of 30 or greater. Reapply every 2 to 3 hours or after sweating, drying off with a towel, or swimming. · Always wear a seat belt when traveling in a car. Always wear a helmet when riding a bicycle or motorcycle.

## 2021-12-08 NOTE — PROGRESS NOTES
[address]  PRIMARY CARE  [address]  [address] [address]  Dept: [phone]  Dept Fax: [phone]: [phone]     Chief Complaint  Chief Complaint   Patient presents with    Medicare AWV    Discuss Medications     macrobid, bactrim, fenofibrate    Discuss Labs     12/1/21, needs cbc still, wrong dx put down       HPI:  73 y. o.female who presents for the following:      Spent decades as an ; still works out daily; mostly eats veggies and sometimes fish; didn't tolerate statins (generalized pain) but taking fenofibrate. Hx of osteoporosis: sees gyn from this; thinking about bisphosphonate but undecided    Recurrent UTIs: usually post-coital; takes macrobid after sex; self treats with bactrim for actual UTIs    The 10-year ASCVD risk score (Tiera Ness, et al., 2013) is: 8.8%    Values used to calculate the score:      Age: 73 years      Sex: Female      Is Non- : No      Diabetic: No      Tobacco smoker: No      Systolic Blood Pressure: 255 mmHg      Is BP treated: No      HDL Cholesterol: 50 mg/dL      Total Cholesterol: 222 mg/dL     Review of Systems   Constitutional: Negative for chills and fever. HENT: Negative for congestion, rhinorrhea and sore throat. Respiratory: Negative for cough, shortness of breath and wheezing. Gastrointestinal: Negative for abdominal pain, constipation and diarrhea. Endocrine: Negative for polydipsia and polyuria. Genitourinary: Negative for dysuria, frequency and urgency. Neurological: Negative for syncope, light-headedness, numbness and headaches. Psychiatric/Behavioral: Negative for sleep disturbance. The patient is not nervous/anxious.         Past Medical History:   Diagnosis Date    Abnormal white blood cell (WBC) count 11/15/2016    Hiatal hernia     Hyperlipidemia     Hypothyroidism     Pap smear for cervical cancer screening 9/2011    normal    prophylaxis for Recurrent UTI 12/18/2013    Screening mammogram 9/2011    normal     Past Surgical History:   Procedure Laterality Date    CHOLECYSTECTOMY      COLONOSCOPY      when she turned 50, normal    KNEE SURGERY Bilateral     right knee replacement, left knee      Social History     Socioeconomic History    Marital status:      Spouse name: Not on file    Number of children: Not on file    Years of education: Not on file    Highest education level: Not on file   Occupational History    Not on file   Tobacco Use    Smoking status: Never Smoker    Smokeless tobacco: Never Used   Vaping Use    Vaping Use: Never used   Substance and Sexual Activity    Alcohol use: Yes     Alcohol/week: 0.0 standard drinks     Types: 3 Glasses of wine per week     Comment: socially, moderately    Drug use: No    Sexual activity: Yes     Partners: Male   Other Topics Concern    Not on file   Social History Narrative    Not on file     Social Determinants of Health     Financial Resource Strain: Low Risk     Difficulty of Paying Living Expenses: Not hard at all   Food Insecurity: No Food Insecurity    Worried About Running Out of Food in the Last Year: Never true    [address] in the Last Year: Never true   Transportation Needs:     Lack of Transportation (Medical): Not on file    Lack of Transportation (Non-Medical):  Not on file   Physical Activity:     Days of Exercise per Week: Not on file    Minutes of Exercise per Session: Not on file   Stress:     Feeling of Stress : Not on file   Social Connections:     Frequency of Communication with Friends and Family: Not on file    Frequency of Social Gatherings with Friends and Family: Not on file    Attends [employer] Services: Not on file    Active Member of Clubs or Organizations: Not on file    Attends Club or Organization Meetings: Not on file    Marital Status: Not on file   Intimate Partner Violence:     Fear of Current or Ex-Partner: Not on file    Emotionally Abused: Not on file    Physically Abused: Not on file    Sexually Abused: Not on file   Housing Stability:     Unable to Pay for Housing in the Last Year: Not on file    Number of [place] in the Last Year: Not on file    Unstable Housing in the Last Year: Not on file     Family History   Problem Relation Age of Onset    High Cholesterol Mother     High Cholesterol Father       No Known Allergies  Current Outpatient Medications   Medication Sig Dispense Refill    nitrofurantoin, macrocrystal-monohydrate, (MACROBID) 100 MG capsule TAKE 1 CAPSULE BY MOUTH once as needed (postcoital for uti prophylaxis) 30 capsule 1    sulfamethoxazole-trimethoprim (BACTRIM DS;SEPTRA DS) 800-160 MG per tablet TAKE 1 TABLET BY MOUTH TWICE DAILY 12 tablet 1    levothyroxine (SYNTHROID) 88 MCG tablet Take 1 tablet by mouth daily 90 tablet 3    fenofibrate (TRIGLIDE) 160 MG tablet Take 1 tablet by mouth daily 90 tablet 2    triamcinolone (KENALOG) 0.1 % cream Apply topically 2 times daily. 80 g 1    fluticasone (FLONASE) 50 MCG/ACT nasal spray 1 spray by Nasal route daily 32 g 5    IMVEXXY MAINTENANCE PACK 10 MCG INST PLACE 1 INSERT VAGINALLY ON MONDAY AND THURSDAY 24 each 2    Acetylcysteine ( PO) Take by mouth      Omega 3 1000 MG CAPS Take 1 Can by mouth daily.  ECHINACEA by Does not apply route.  therapeutic multivitamin-minerals (THERAGRAN-M) tablet Take 1 tablet by mouth daily.  Misc Natural Products (LUTEIN 20 PO) Take  by mouth.  BIOTIN FORTE PO Take  by mouth.  Vitamin D (CHOLECALCIFEROL) 1000 UNITS CAPS capsule Take 1,000 Units by mouth daily. No current facility-administered medications for this visit.          Vitals:    12/08/21 1036   BP: 126/84   Pulse: 78   Temp: 97.4 °F (36.3 °C)   TempSrc: Infrared   SpO2: 98%   Weight: 134 lb (60.8 kg)   Height: 5' 6\" (1.676 m)       Physical exam:  Physical Exam  Vitals reviewed. Constitutional:       General: She is not in acute distress. Appearance: She is well-developed. HENT:      Head: Normocephalic and atraumatic. Right Ear: Tympanic membrane, ear canal and external ear normal. Tympanic membrane is not erythematous. Tympanic membrane has normal mobility. Left Ear: Tympanic membrane, ear canal and external ear normal. Tympanic membrane is not erythematous. Tympanic membrane has normal mobility. Nose: Nose normal.      Mouth/Throat:      Pharynx: No oropharyngeal exudate. Neck:      Thyroid: No thyromegaly. Cardiovascular:      Rate and Rhythm: Normal rate and regular rhythm. Heart sounds: Normal heart sounds. No murmur heard. Pulmonary:      Effort: Pulmonary effort is normal. No respiratory distress. Breath sounds: Normal breath sounds. No wheezing. Abdominal:      General: Bowel sounds are normal. There is no distension. Palpations: Abdomen is soft. Tenderness: There is no abdominal tenderness. There is no guarding or rebound. Musculoskeletal:      Cervical back: Normal range of motion. Lymphadenopathy:      Cervical: No cervical adenopathy. Skin:     General: Skin is warm and dry. Neurological:      Mental Status: She is alert and oriented to person, place, and time. Psychiatric:         Behavior: Behavior normal.         Assessment/Plan:  73 y.o. female here mainly for multiple issues:  - Recurrent UTIs: plan in place for postcoital prophylaxis with macrobid and self-treating with bactrim  - Statin intolerance: she has tried multiple; will cont with the fenofibrate  - Rash: itchy irritated skin in the winter; topical steroid helps  - Osteoporosis: she is still contemplative of tx     Diagnosis Orders   1. Routine general medical examination at a health care facility  CBC With Auto Differential   2.  Encounter for immunization     3. prophylaxis for Recurrent UTI  nitrofurantoin, macrocrystal-monohydrate, (MACROBID) 100 MG capsule    sulfamethoxazole-trimethoprim (BACTRIM DS;SEPTRA DS) 800-160 MG per tablet   4. Acquired hypothyroidism  levothyroxine (SYNTHROID) 88 MCG tablet   5. Pure hypercholesterolemia  fenofibrate (TRIGLIDE) 160 MG tablet   6. Rash  triamcinolone (KENALOG) 0.1 % cream   7. Fluid level behind tympanic membrane of right ear  fluticasone (FLONASE) 50 MCG/ACT nasal spray   8. Statin intolerance     9. Senile osteoporosis          Return for Medicare Annual Wellness Visit in 1 year. Stefan Spain MD     Medicare Annual Wellness Visit  Name: Danyell Marrow Date: 2021   MRN: [id number] Sex: Female   Age: 73 y.o. Ethnicity: Non- / Non    : 1950 Race: White (non-)      Justa Motta is here for Medicare AWV, Discuss Medications (macrobid, bactrim, fenofibrate), and Discuss Labs (21, needs cbc still, wrong dx put down)    Screenings for behavioral, psychosocial and functional/safety risks, and cognitive dysfunction are all negative except as indicated below. These results, as well as other patient data from the [address] form, are documented in Flowsheets linked to this Encounter. No Known Allergies      Prior to Visit Medications    Medication Sig Taking? Authorizing Provider   nitrofurantoin, macrocrystal-monohydrate, (MACROBID) 100 MG capsule TAKE 1 CAPSULE BY MOUTH once as needed (postcoital for uti prophylaxis) Yes Stefan Spain MD   sulfamethoxazole-trimethoprim (BACTRIM DS;SEPTRA DS) 800-160 MG per tablet TAKE 1 TABLET BY MOUTH TWICE DAILY Yes Stefan Spain MD   levothyroxine (SYNTHROID) 88 MCG tablet Take 1 tablet by mouth daily Yes Stefan Spain MD   fenofibrate (TRIGLIDE) 160 MG tablet Take 1 tablet by mouth daily Yes Stefan Spain MD   triamcinolone (KENALOG) 0.1 % cream Apply topically 2 times daily.  Yes Stefan Spain MD   fluticasone Tasha Contras) 50 MCG/ACT nasal spray 1 spray by Nasal route daily Yes Stefan Spain MD [address] INSERT VAGINALLY  [facility] [place] Yes Althea Frazier MD   Acetylcysteine ( PO) Take by mouth Yes Historical Provider, MD   Omega 3 1000 MG CAPS Take 1 Can by mouth daily. Yes Historical Provider, MD   [facility] by Does not apply route. Yes Historical Provider, MD   therapeutic multivitamin-minerals (THERAGRAN-M) tablet Take 1 tablet by mouth daily. Yes Historical Provider, MD   Misc Natural Products (LUTEIN 20 PO) Take  by mouth. Yes Historical Provider, MD   BIOTIN FORTE PO Take  by mouth. Yes Historical Provider, MD   Vitamin D (CHOLECALCIFEROL) 1000 UNITS CAPS capsule Take 1,000 Units by mouth daily.  Yes Historical Provider, MD         Past Medical History:   Diagnosis Date    Abnormal white blood cell (WBC) count 11/15/2016    Hiatal hernia     Hyperlipidemia     Hypothyroidism     Pap smear for cervical cancer screening 9/2011    normal    prophylaxis for Recurrent UTI 12/18/2013    Screening mammogram 9/2011    normal       Past Surgical History:   Procedure Laterality Date    CHOLECYSTECTOMY      COLONOSCOPY      when she turned 50, normal    KNEE SURGERY Bilateral     right knee replacement, left knee          Family History   Problem Relation Age of Onset    High Cholesterol Mother     High Cholesterol Father        CareTeam (Including outside providers/suppliers regularly involved in providing care):   Patient Care Team:  Althea Frazier MD as PCP - General (Family Medicine)  Althea Frazier MD as PCP - [facility] Empaneled Provider  Bess Rose DO as Surgeon (Orthopedic Surgery)  Robert Padilla DO as Consulting Physician (Obstetrics & Gynecology)  Ese Fuentes (Optometry)    Wt Readings from Last 3 Encounters:   12/08/21 134 lb (60.8 kg)   08/26/21 132 lb 6.4 oz (60.1 kg)   08/14/21 132 lb 6.4 oz (60.1 kg)     Vitals:    12/08/21 1036   BP: 126/84   Pulse: 78   Temp: 97.4 °F (36.3 °C)   TempSrc: Infrared   SpO2: 98%   Weight: 134 lb (60.8 kg)   Height: 5' 6\" (1.676 m)     Body mass index is 21.63 kg/m². Based upon direct observation of the patient, evaluation of cognition reveals recent and remote memory intact. Patient's complete Health Risk Assessment and screening values have been reviewed and are found in Flowsheets. The following problems were reviewed today and where indicated follow up appointments were made and/or referrals ordered.     Positive Risk Factor Screenings with Interventions:             Safety:  Safety  Do you have working smoke detectors?: Yes  Have all throw rugs been removed or fastened?: Yes  Do you have non-slip mats or surfaces in all bathtubs/showers?: (!) No  Do all of your stairways have a railing or banister?: Yes  Are your doorways, halls and stairs free of clutter?: Yes  Do you always fasten your seatbelt when you are in a car?: Yes  Safety Interventions:  · Home safety tips provided     Personalized Preventive Plan   Current Health Maintenance Status  Immunization History   Administered Date(s) Administered    COVID-19, Valentino Flattery, Primary or Immunocompromised, PF, 100mcg/0.5mL 02/08/2021, 03/08/2021, 10/26/2021    Influenza 11/06/2013    Influenza Vaccine, unspecified formulation 11/06/2013    Influenza Virus Vaccine 10/26/2011, 11/06/2013, 10/01/2014, 09/30/2015, 11/11/2016, 11/15/2021    Influenza, High Dose (Fluzone 65 yrs and older) 10/06/2017, 10/05/2018, 09/08/2020    Influenza, High-dose, Rodrigue Spine, 65 yrs +, IM (Fluzone) 09/08/2020    Influenza, Quadv, IM, (6 mo and older Fluzone, Flulaval, Fluarix and 3 yrs and older Afluria) 11/11/2016    Influenza, Triv, inactivated, subunit, adjuvanted, IM (Fluad 65 yrs and older) 10/14/2019    Pneumococcal Conjugate 13-valent ([id number]) 11/13/2017    Pneumococcal Polysaccharide ([id number]) 11/27/2018, 05/25/2021    Tdap (Boostrix, Adacel) 12/05/2013    Zoster Recombinant (Shingrix) 07/16/2019, 10/17/2019        Health Maintenance   Topic Date Due  Annual Wellness Visit (AWV)  12/04/2021    TSH testing  12/01/2022    Breast cancer screen  11/10/2023    DTaP/Tdap/Td vaccine (2 - Td or Tdap) 12/05/2023    Lipid screen  12/01/2026    Colon cancer screen colonoscopy  03/10/2030    DEXA (modify frequency per FRAX score)  Completed    Flu vaccine  Completed    Shingles Vaccine  Completed    Pneumococcal 65+ years Vaccine  Completed    COVID-19 Vaccine  Completed    Hepatitis C screen  Completed    Hepatitis A vaccine  Aged Out    Hepatitis B vaccine  Aged Out    Hib vaccine  Aged Out    Meningococcal (ACWY) vaccine  Aged Out     Recommendations for Degree Controls Due: see orders and patient instructions/AVS.  . Recommended screening schedule for the next 5-10 years is provided to the patient in written form: see Patient Instructions/AVS.    Shay Murphy was seen today for medicare awv, discuss medications and discuss labs. Diagnoses and all orders for this visit:    Routine general medical examination at a health care facility  -     CBC With Auto Differential; Future    Encounter for immunization    prophylaxis for Recurrent UTI  -     nitrofurantoin, macrocrystal-monohydrate, (MACROBID) 100 MG capsule; TAKE 1 CAPSULE BY MOUTH once as needed (postcoital for uti prophylaxis)  -     sulfamethoxazole-trimethoprim (BACTRIM DS;SEPTRA DS) 800-160 MG per tablet; TAKE 1 TABLET BY MOUTH TWICE DAILY    Acquired hypothyroidism  -     levothyroxine (SYNTHROID) 88 MCG tablet; Take 1 tablet by mouth daily    Pure hypercholesterolemia  -     fenofibrate (TRIGLIDE) 160 MG tablet; Take 1 tablet by mouth daily    Rash  -     triamcinolone (KENALOG) 0.1 % cream; Apply topically 2 times daily.     Fluid level behind tympanic membrane of right ear  -     fluticasone (FLONASE) 50 MCG/ACT nasal spray; 1 spray by Nasal route daily    Statin intolerance    Senile osteoporosis

## 2022-01-20 ENCOUNTER — TELEPHONE (OUTPATIENT)
Dept: FAMILY MEDICINE CLINIC | Age: 72
End: 2022-01-20

## 2022-01-20 DIAGNOSIS — D72.819 LEUKOPENIA, UNSPECIFIED TYPE: Primary | ICD-10-CM

## 2022-01-20 NOTE — TELEPHONE ENCOUNTER
Matthew Patel called in to let you know the code you put in for the patients CBC order is not covered by insurance. She is requesting a new order to be put in with a new code.     Once the new CBC is placed we will need to call Priya Lazo at [phone]

## 2022-01-21 NOTE — TELEPHONE ENCOUNTER
Lab reordered with new diagnosis.  She doesn't need to redraw since it was done last month and normal.

## 2022-03-31 ENCOUNTER — COMMUNITY OUTREACH (OUTPATIENT)
Dept: INTERNAL MEDICINE | Age: 72
End: 2022-03-31

## 2022-03-31 NOTE — PROGRESS NOTES
Patient's HM shows they are overdue for [address] and  files searched without success. No results to attach to order nor HM updated. No upcoming appointment.

## 2022-05-25 ENCOUNTER — OFFICE VISIT (OUTPATIENT)
Dept: INTERNAL MEDICINE | Age: 72
End: 2022-05-25
Payer: MEDICARE

## 2022-05-25 VITALS
BODY MASS INDEX: 21.53 KG/M2 | HEIGHT: 66 IN | WEIGHT: 134 LBS | TEMPERATURE: 98.3 F | HEART RATE: 94 BPM | SYSTOLIC BLOOD PRESSURE: 138 MMHG | OXYGEN SATURATION: 98 % | DIASTOLIC BLOOD PRESSURE: 82 MMHG

## 2022-05-25 DIAGNOSIS — J34.89 RHINORRHEA: Primary | ICD-10-CM

## 2022-05-25 DIAGNOSIS — R09.82 POST-NASAL DRIP: ICD-10-CM

## 2022-05-25 DIAGNOSIS — H65.93 FLUID LEVEL BEHIND TYMPANIC MEMBRANE OF BOTH EARS: ICD-10-CM

## 2022-05-25 PROCEDURE — 1090F PRES/ABSN URINE INCON ASSESS: CPT | Performed by: NURSE PRACTITIONER

## 2022-05-25 PROCEDURE — G8400 PT W/DXA NO RESULTS DOC: HCPCS | Performed by: NURSE PRACTITIONER

## 2022-05-25 PROCEDURE — G8427 DOCREV CUR MEDS BY ELIG CLIN: HCPCS | Performed by: NURSE PRACTITIONER

## 2022-05-25 PROCEDURE — 3017F COLORECTAL CA SCREEN DOC REV: CPT | Performed by: NURSE PRACTITIONER

## 2022-05-25 PROCEDURE — 1123F ACP DISCUSS/DSCN MKR DOCD: CPT | Performed by: NURSE PRACTITIONER

## 2022-05-25 PROCEDURE — G8420 CALC BMI NORM PARAMETERS: HCPCS | Performed by: NURSE PRACTITIONER

## 2022-05-25 PROCEDURE — 99213 OFFICE O/P EST LOW 20 MIN: CPT | Performed by: NURSE PRACTITIONER

## 2022-05-25 PROCEDURE — 1036F TOBACCO NON-USER: CPT | Performed by: NURSE PRACTITIONER

## 2022-05-25 RX ORDER — FLUTICASONE PROPIONATE 50 MCG
1 SPRAY, SUSPENSION (ML) NASAL DAILY
Qty: 1 EACH | Refills: 1 | Status: CANCELLED | OUTPATIENT
Start: 2022-05-25

## 2022-05-25 RX ORDER — FEXOFENADINE HCL 180 MG/1
180 TABLET ORAL DAILY
Qty: 30 TABLET | Refills: 0 | Status: SHIPPED | OUTPATIENT
Start: 2022-05-25 | End: 2022-05-25

## 2022-05-25 RX ORDER — FEXOFENADINE HCL 180 MG/1
180 TABLET ORAL DAILY
Qty: 30 TABLET | Refills: 0 | Status: SHIPPED | OUTPATIENT
Start: 2022-05-25 | End: 2022-06-24

## 2022-05-25 RX ORDER — LORATADINE 10 MG/1
10 TABLET ORAL DAILY
Qty: 30 TABLET | Refills: 0 | Status: CANCELLED | OUTPATIENT
Start: 2022-05-25

## 2022-05-25 ASSESSMENT — PATIENT HEALTH QUESTIONNAIRE - PHQ9
SUM OF ALL RESPONSES TO PHQ QUESTIONS 1-9: 0
8. MOVING OR SPEAKING SO SLOWLY THAT OTHER PEOPLE COULD HAVE NOTICED. OR THE OPPOSITE, BEING SO FIGETY OR RESTLESS THAT YOU HAVE BEEN MOVING AROUND A LOT MORE THAN USUAL: 0
6. FEELING BAD ABOUT YOURSELF - OR THAT YOU ARE A FAILURE OR HAVE LET YOURSELF OR YOUR FAMILY DOWN: 0
SUM OF ALL RESPONSES TO PHQ QUESTIONS 1-9: 0
SUM OF ALL RESPONSES TO PHQ QUESTIONS 1-9: 0
2. FEELING DOWN, DEPRESSED OR HOPELESS: 0
5. POOR APPETITE OR OVEREATING: 0
7. TROUBLE CONCENTRATING ON THINGS, SUCH AS READING THE NEWSPAPER OR WATCHING TELEVISION: 0
9. THOUGHTS THAT YOU WOULD BE BETTER OFF DEAD, OR OF HURTING YOURSELF: 0
1. LITTLE INTEREST OR PLEASURE IN DOING THINGS: 0
3. TROUBLE FALLING OR STAYING ASLEEP: 0
SUM OF ALL RESPONSES TO PHQ9 QUESTIONS 1 & 2: 0
SUM OF ALL RESPONSES TO PHQ QUESTIONS 1-9: 0
10. IF YOU CHECKED OFF ANY PROBLEMS, HOW DIFFICULT HAVE THESE PROBLEMS MADE IT FOR YOU TO DO YOUR WORK, TAKE CARE OF THINGS AT HOME, OR GET ALONG WITH OTHER PEOPLE: 0
4. FEELING TIRED OR HAVING LITTLE ENERGY: 0

## 2022-05-25 ASSESSMENT — ENCOUNTER SYMPTOMS
RHINORRHEA: 1
WHEEZING: 0
VOMITING: 0
COUGH: 0
SHORTNESS OF BREATH: 0
CHEST TIGHTNESS: 0
BACK PAIN: 0
SORE THROAT: 1
ABDOMINAL PAIN: 0
DIARRHEA: 0
NAUSEA: 0

## 2022-05-25 NOTE — PROGRESS NOTES
Savanna Jernigan (:  1950) is a 81 y.o. female, Established patient, here for evaluation of the following chief complaint(s):  Post-COVID Symptoms (22- Pt states that after covid she now has drainage, and ST from the drainage. )      Vitals:    22 1416   BP: 138/82   Pulse: 94   Temp: 98.3 °F (36.8 °C)   SpO2: 98%       ASSESSMENT/PLAN:  1. Rhinorrhea  -     fexofenadine (ALLEGRA) 180 MG tablet; Take 1 tablet by mouth daily, Disp-30 tablet, R-0Normal  -     D/c zyrtec for now (pt has used >1yr)  2. Post-nasal drip  -     fexofenadine (ALLEGRA) 180 MG tablet; Take 1 tablet by mouth daily, Disp-30 tablet, R-0Normal  3. Fluid level behind tympanic membrane of both ears        -    Continue Flonase    Return if symptoms worsen or fail to improve. SUBJECTIVE/OBJECTIVE:    Pharyngitis  This is a new problem. Episode onset: covid+ on 5/10/22. has had runny nose and sore throat after having covid. The problem occurs constantly. The problem has been unchanged. Associated symptoms include a sore throat. Pertinent negatives include no abdominal pain, arthralgias, chest pain, chills, congestion, coughing, fatigue, fever, headaches, myalgias, nausea or vomiting. Associated symptoms comments: Runny nose. Treatments tried: zyrtec for the past  year, flonase daily. The treatment provided mild relief. Review of Systems   Constitutional: Negative for chills, fatigue and fever. HENT: Positive for rhinorrhea and sore throat. Negative for congestion and ear pain. Respiratory: Negative for cough, chest tightness, shortness of breath and wheezing. Cardiovascular: Negative for chest pain, palpitations and leg swelling. Gastrointestinal: Negative for abdominal pain, diarrhea, nausea and vomiting. Musculoskeletal: Negative for arthralgias, back pain and myalgias. Neurological: Negative for dizziness, light-headedness and headaches.        Physical Exam  Constitutional:       General: She is not in acute distress. Appearance: Normal appearance. HENT:      Right Ear: A middle ear effusion is present. Tympanic membrane is not erythematous. Left Ear: A middle ear effusion is present. Tympanic membrane is not erythematous. Nose: Rhinorrhea present. No mucosal edema. Rhinorrhea is clear. Right Turbinates: Not swollen. Left Turbinates: Not swollen. Right Sinus: No maxillary sinus tenderness or frontal sinus tenderness. Left Sinus: No maxillary sinus tenderness or frontal sinus tenderness. Mouth/Throat:      Lips: Pink. Mouth: Mucous membranes are moist.      Pharynx: Oropharyngeal exudate: post nasal drip. Cardiovascular:      Rate and Rhythm: Normal rate and regular rhythm. Heart sounds: Normal heart sounds, S1 normal and S2 normal.   Pulmonary:      Effort: Pulmonary effort is normal. No respiratory distress. Breath sounds: Normal air entry. No decreased breath sounds, wheezing, rhonchi or rales. Skin:     General: Skin is warm and dry. Neurological:      Mental Status: She is alert and oriented to person, place, and time. Psychiatric:         Mood and Affect: Mood normal.         Behavior: Behavior is cooperative. An electronic signature was used to authenticate this note.     --HAYDEN Roldan

## 2022-06-13 DIAGNOSIS — R21 RASH: ICD-10-CM

## 2022-06-13 RX ORDER — TRIAMCINOLONE ACETONIDE 1 MG/G
CREAM TOPICAL
Qty: 80 G | Refills: 3 | Status: SHIPPED | OUTPATIENT
Start: 2022-06-13

## 2022-06-13 NOTE — TELEPHONE ENCOUNTER
Comments:     Last Office Visit (last PCP visit):   12/8/2021    Next Visit Date:  No future appointments. **If hasn't been seen in over a year OR hasn't followed up according to last diabetes/ADHD visit, make appointment for patient before sending refill to provider. Rx requested:  Requested Prescriptions     Pending Prescriptions Disp Refills    triamcinolone (KENALOG) 0.1 % cream 80 g 3     Sig: Apply topically 2 times daily.

## 2022-09-21 DIAGNOSIS — E78.00 PURE HYPERCHOLESTEROLEMIA: ICD-10-CM

## 2022-09-21 RX ORDER — FENOFIBRATE 160 MG/1
160 TABLET ORAL DAILY
Qty: 90 TABLET | Refills: 0 | Status: SHIPPED | OUTPATIENT
Start: 2022-09-21

## 2022-09-21 NOTE — TELEPHONE ENCOUNTER
Comments:     Last Office Visit (last PCP visit):   12/8/2021    Next Visit Date:  No future appointments. **If hasn't been seen in over a year OR hasn't followed up according to last diabetes/ADHD visit, make appointment for patient before sending refill to provider.     Rx requested:  Requested Prescriptions     Pending Prescriptions Disp Refills    fenofibrate (TRIGLIDE) 160 MG tablet [Pharmacy Med Name: FENOFIBRATE  160MG  TAB] 90 tablet 3     Sig: TAKE 1 TABLET BY MOUTH  DAILY

## 2022-10-06 ENCOUNTER — TELEPHONE (OUTPATIENT)
Dept: INTERNAL MEDICINE | Age: 72
End: 2022-10-06

## 2022-10-06 NOTE — TELEPHONE ENCOUNTER
----- Message from Alexander Hanson sent at 10/5/2022  9:47 AM EDT -----  Subject: Message to Provider    QUESTIONS  Information for Provider? pt would like for someone to reach out to her   when order for blood work is ready she would like to confirm and possible   come to office to get this done .  ---------------------------------------------------------------------------  --------------  [address]  [phone]; OK to leave message on voicemail  ---------------------------------------------------------------------------  --------------  SCRIPT ANSWERS  Relationship to Patient?  Self

## 2022-10-06 NOTE — TELEPHONE ENCOUNTER
Subject: Referral Request     Reason for referral request? needs a full panel blood work   Provider patient wants to be referred to(if known):     Provider Phone Number(if known):      Additional Information for Provider?   ---------------------------------------------------------------------------   --------------   Mackenzie Eller INFO     [phone]; OK to leave message on voicemail   ---------------------------------------------------------------------------   --------------

## 2022-10-12 DIAGNOSIS — E03.9 ACQUIRED HYPOTHYROIDISM: ICD-10-CM

## 2022-10-13 RX ORDER — LEVOTHYROXINE SODIUM 88 UG/1
88 TABLET ORAL DAILY
Qty: 90 TABLET | Refills: 3 | Status: SHIPPED | OUTPATIENT
Start: 2022-10-13

## 2022-11-20 ENCOUNTER — OFFICE VISIT (OUTPATIENT)
Dept: INTERNAL MEDICINE | Age: 72
End: 2022-11-20
Payer: MEDICARE

## 2022-11-20 VITALS
OXYGEN SATURATION: 98 % | HEART RATE: 112 BPM | TEMPERATURE: 97.9 F | SYSTOLIC BLOOD PRESSURE: 138 MMHG | WEIGHT: 130 LBS | HEIGHT: 65 IN | BODY MASS INDEX: 21.66 KG/M2 | DIASTOLIC BLOOD PRESSURE: 78 MMHG

## 2022-11-20 DIAGNOSIS — N89.8 VAGINAL IRRITATION: Primary | ICD-10-CM

## 2022-11-20 DIAGNOSIS — N81.6 RECTOCELE: ICD-10-CM

## 2022-11-20 DIAGNOSIS — R30.9 PAINFUL URINATION: ICD-10-CM

## 2022-11-20 LAB
BILIRUBIN, POC: NORMAL
BLOOD URINE, POC: NORMAL
CLARITY, POC: NORMAL
COLOR, POC: YELLOW
GLUCOSE URINE, POC: NORMAL
KETONES, POC: NORMAL
LEUKOCYTE EST, POC: NORMAL
NITRITE, POC: NORMAL
PH, POC: 6
PROTEIN, POC: NORMAL
SPECIFIC GRAVITY, POC: 1.01
UROBILINOGEN, POC: NORMAL

## 2022-11-20 PROCEDURE — 81003 URINALYSIS AUTO W/O SCOPE: CPT | Performed by: NURSE PRACTITIONER

## 2022-11-20 PROCEDURE — 99213 OFFICE O/P EST LOW 20 MIN: CPT | Performed by: NURSE PRACTITIONER

## 2022-11-20 PROCEDURE — G8400 PT W/DXA NO RESULTS DOC: HCPCS | Performed by: NURSE PRACTITIONER

## 2022-11-20 PROCEDURE — G8484 FLU IMMUNIZE NO ADMIN: HCPCS | Performed by: NURSE PRACTITIONER

## 2022-11-20 PROCEDURE — G8420 CALC BMI NORM PARAMETERS: HCPCS | Performed by: NURSE PRACTITIONER

## 2022-11-20 PROCEDURE — 3017F COLORECTAL CA SCREEN DOC REV: CPT | Performed by: NURSE PRACTITIONER

## 2022-11-20 PROCEDURE — G8427 DOCREV CUR MEDS BY ELIG CLIN: HCPCS | Performed by: NURSE PRACTITIONER

## 2022-11-20 PROCEDURE — 1090F PRES/ABSN URINE INCON ASSESS: CPT | Performed by: NURSE PRACTITIONER

## 2022-11-20 PROCEDURE — 1123F ACP DISCUSS/DSCN MKR DOCD: CPT | Performed by: NURSE PRACTITIONER

## 2022-11-20 PROCEDURE — 1036F TOBACCO NON-USER: CPT | Performed by: NURSE PRACTITIONER

## 2022-11-20 RX ORDER — GLYCERIN/MIN OIL/POLYCARBOPHIL
1 GEL WITH APPLICATOR (GRAM) VAGINAL 2 TIMES DAILY PRN
Qty: 35 G | Refills: 0 | Status: SHIPPED | OUTPATIENT
Start: 2022-11-20

## 2022-11-20 ASSESSMENT — ENCOUNTER SYMPTOMS
WHEEZING: 0
DIARRHEA: 0
ABDOMINAL PAIN: 0
VOMITING: 0
SHORTNESS OF BREATH: 0
BACK PAIN: 0
COUGH: 0
NAUSEA: 0

## 2022-11-20 NOTE — PROGRESS NOTES
Bhakti Vera (:  1950) is a 81 y.o. female, Established patient, here for evaluation of the following chief complaint(s): Other (Burning when urinating)      Vitals:    22 1112   BP: 138/78   Pulse: (!) 112   Temp: 97.9 °F (36.6 °C)   SpO2: 98%       ASSESSMENT/PLAN:  1. Vaginal irritation  -     Vaginal Lubricant (REPLENS) GEL; Place 1 Dose vaginally 2 times daily as needed (vaginal irritation), Disp-35 g, R-0Normal  2. Painful urination  -     POCT Urinalysis No Micro (Auto)  3. Rectocele        Return if symptoms worsen or fail to improve, for GYN. SUBJECTIVE/OBJECTIVE:    Other  This is a new problem. Episode onset: pt states urinary symptoms that started Wednesday. has been taking Bactrim with resoloution of symptoms. Pt has a rectocele. Now has irritation to the outside of the vagina. The problem occurs constantly. The problem has been unchanged. Pertinent negatives include no abdominal pain, arthralgias, chest pain, chills, coughing, fatigue, fever, myalgias, nausea, urinary symptoms or vomiting. Treatments tried: pinxav. Bactrim. The treatment provided no relief (pinxav made irritation worse. Bactrim resolved UTI symptoms). Review of Systems   Constitutional:  Negative for chills, fatigue and fever. Respiratory:  Negative for cough, shortness of breath and wheezing. Cardiovascular:  Negative for chest pain and palpitations. Gastrointestinal:  Negative for abdominal pain, diarrhea, nausea and vomiting. Genitourinary:  Negative for dysuria, frequency, vaginal discharge and vaginal pain (irritation to outside vagina). Musculoskeletal:  Negative for arthralgias, back pain and myalgias. Physical Exam  Vitals reviewed. Constitutional:       General: She is not in acute distress. Appearance: Normal appearance. Cardiovascular:      Rate and Rhythm: Normal rate and regular rhythm.       Heart sounds: Normal heart sounds, S1 normal and S2 normal.   Pulmonary: Effort: Pulmonary effort is normal. No respiratory distress. Breath sounds: Normal air entry. No decreased breath sounds, wheezing, rhonchi or rales. Abdominal:      General: Bowel sounds are normal.      Palpations: Abdomen is soft. Tenderness: There is no abdominal tenderness. There is no right CVA tenderness or left CVA tenderness. Genitourinary:     Comments: Deferred. Pt states outside vaginal irritation. No odor, discharge or itching. Skin:     General: Skin is warm and dry. Neurological:      Mental Status: She is alert and oriented to person, place, and time. Psychiatric:         Behavior: Behavior is cooperative. An electronic signature was used to authenticate this note.     --HAYDEN Todd

## 2022-11-22 LAB
ORGANISM: ABNORMAL
URINE CULTURE, ROUTINE: ABNORMAL
URINE CULTURE, ROUTINE: ABNORMAL

## 2022-11-23 DIAGNOSIS — N30.01 ACUTE CYSTITIS WITH HEMATURIA: Primary | ICD-10-CM

## 2022-11-23 RX ORDER — NITROFURANTOIN 25; 75 MG/1; MG/1
100 CAPSULE ORAL 2 TIMES DAILY
Qty: 10 CAPSULE | Refills: 0 | Status: SHIPPED | OUTPATIENT
Start: 2022-11-23 | End: 2022-11-28

## 2022-11-29 ENCOUNTER — TELEPHONE (OUTPATIENT)
Dept: INTERNAL MEDICINE | Age: 72
End: 2022-11-29

## 2022-11-29 NOTE — TELEPHONE ENCOUNTER
Patient called and she is requesting a phone call back.  Her phone number is [phone]    Thank you

## 2022-12-02 SDOH — HEALTH STABILITY: PHYSICAL HEALTH: ON AVERAGE, HOW MANY MINUTES DO YOU ENGAGE IN EXERCISE AT THIS LEVEL?: 150+ MIN

## 2022-12-02 SDOH — HEALTH STABILITY: PHYSICAL HEALTH: ON AVERAGE, HOW MANY DAYS PER WEEK DO YOU ENGAGE IN MODERATE TO STRENUOUS EXERCISE (LIKE A BRISK WALK)?: 6 DAYS

## 2022-12-02 ASSESSMENT — PATIENT HEALTH QUESTIONNAIRE - PHQ9
2. FEELING DOWN, DEPRESSED OR HOPELESS: 0
SUM OF ALL RESPONSES TO PHQ QUESTIONS 1-9: 0
SUM OF ALL RESPONSES TO PHQ QUESTIONS 1-9: 0
SUM OF ALL RESPONSES TO PHQ9 QUESTIONS 1 & 2: 0
1. LITTLE INTEREST OR PLEASURE IN DOING THINGS: 0
SUM OF ALL RESPONSES TO PHQ QUESTIONS 1-9: 0
SUM OF ALL RESPONSES TO PHQ QUESTIONS 1-9: 0

## 2022-12-02 ASSESSMENT — LIFESTYLE VARIABLES
HOW OFTEN DO YOU HAVE A DRINK CONTAINING ALCOHOL: PATIENT DECLINED
HOW MANY STANDARD DRINKS CONTAINING ALCOHOL DO YOU HAVE ON A TYPICAL DAY: PATIENT DECLINED

## 2022-12-08 ASSESSMENT — LIFESTYLE VARIABLES
HOW OFTEN DO YOU HAVE A DRINK CONTAINING ALCOHOL: 98
HOW MANY STANDARD DRINKS CONTAINING ALCOHOL DO YOU HAVE ON A TYPICAL DAY: 98
HOW OFTEN DO YOU HAVE SIX OR MORE DRINKS ON ONE OCCASION: 1

## 2022-12-14 ENCOUNTER — OFFICE VISIT (OUTPATIENT)
Dept: INTERNAL MEDICINE | Age: 72
End: 2022-12-14

## 2022-12-14 ENCOUNTER — TELEPHONE (OUTPATIENT)
Dept: FAMILY MEDICINE CLINIC | Age: 72
End: 2022-12-14

## 2022-12-14 VITALS
OXYGEN SATURATION: 98 % | WEIGHT: 128 LBS | DIASTOLIC BLOOD PRESSURE: 84 MMHG | HEIGHT: 65 IN | SYSTOLIC BLOOD PRESSURE: 124 MMHG | HEART RATE: 97 BPM | BODY MASS INDEX: 21.33 KG/M2

## 2022-12-14 DIAGNOSIS — E78.00 PURE HYPERCHOLESTEROLEMIA: ICD-10-CM

## 2022-12-14 DIAGNOSIS — Z00.00 MEDICARE ANNUAL WELLNESS VISIT, SUBSEQUENT: ICD-10-CM

## 2022-12-14 DIAGNOSIS — Z00.00 ENCOUNTER FOR ANNUAL WELLNESS EXAM IN MEDICARE PATIENT: Primary | ICD-10-CM

## 2022-12-14 DIAGNOSIS — E03.9 ACQUIRED HYPOTHYROIDISM: ICD-10-CM

## 2022-12-14 DIAGNOSIS — Z00.00 ENCOUNTER FOR ANNUAL WELLNESS EXAM IN MEDICARE PATIENT: ICD-10-CM

## 2022-12-14 PROBLEM — N89.8 VAGINAL IRRITATION: Status: RESOLVED | Noted: 2021-08-14 | Resolved: 2022-12-14

## 2022-12-14 LAB
ALBUMIN SERPL-MCNC: 4.6 G/DL (ref 3.5–4.6)
ALP BLD-CCNC: 42 U/L (ref 40–130)
ALT SERPL-CCNC: 11 U/L (ref 0–33)
ANION GAP SERPL CALCULATED.3IONS-SCNC: 14 MEQ/L (ref 9–15)
AST SERPL-CCNC: 19 U/L (ref 0–35)
BILIRUB SERPL-MCNC: 0.3 MG/DL (ref 0.2–0.7)
BUN BLDV-MCNC: 11 MG/DL (ref 8–23)
CALCIUM SERPL-MCNC: 9.8 MG/DL (ref 8.5–9.9)
CHLORIDE BLD-SCNC: 104 MEQ/L (ref 95–107)
CHOLESTEROL, FASTING: 223 MG/DL (ref 0–199)
CO2: 26 MEQ/L (ref 20–31)
CREAT SERPL-MCNC: 0.74 MG/DL (ref 0.5–0.9)
GFR SERPL CREATININE-BSD FRML MDRD: >60 ML/MIN/{1.73_M2}
GLOBULIN: 2.7 G/DL (ref 2.3–3.5)
GLUCOSE FASTING: 151 MG/DL (ref 70–99)
HDLC SERPL-MCNC: 60 MG/DL (ref 40–59)
LDL CHOLESTEROL CALCULATED: 147 MG/DL (ref 0–129)
POTASSIUM SERPL-SCNC: 3.6 MEQ/L (ref 3.4–4.9)
SODIUM BLD-SCNC: 144 MEQ/L (ref 135–144)
TOTAL PROTEIN: 7.3 G/DL (ref 6.3–8)
TRIGLYCERIDE, FASTING: 80 MG/DL (ref 0–150)
TSH SERPL DL<=0.05 MIU/L-ACNC: 2.1 UIU/ML (ref 0.44–3.86)

## 2022-12-14 RX ORDER — SPIRONOLACTONE 25 MG
20 TABLET ORAL DAILY
COMMUNITY
Start: 2017-11-24

## 2022-12-14 RX ORDER — FENOFIBRATE 160 MG/1
160 TABLET ORAL DAILY
Qty: 90 TABLET | Refills: 3 | Status: SHIPPED | OUTPATIENT
Start: 2022-12-14

## 2022-12-14 RX ORDER — FENOFIBRATE 160 MG/1
160 TABLET ORAL DAILY
Qty: 90 TABLET | Refills: 3 | Status: SHIPPED | OUTPATIENT
Start: 2022-12-14 | End: 2022-12-14 | Stop reason: SDUPTHER

## 2022-12-14 SDOH — ECONOMIC STABILITY: FOOD INSECURITY: WITHIN THE PAST 12 MONTHS, THE FOOD YOU BOUGHT JUST DIDN'T LAST AND YOU DIDN'T HAVE MONEY TO GET MORE.: NEVER TRUE

## 2022-12-14 SDOH — ECONOMIC STABILITY: FOOD INSECURITY: WITHIN THE PAST 12 MONTHS, YOU WORRIED THAT YOUR FOOD WOULD RUN OUT BEFORE YOU GOT MONEY TO BUY MORE.: NEVER TRUE

## 2022-12-14 ASSESSMENT — PATIENT HEALTH QUESTIONNAIRE - PHQ9
1. LITTLE INTEREST OR PLEASURE IN DOING THINGS: 0
2. FEELING DOWN, DEPRESSED OR HOPELESS: 0
SUM OF ALL RESPONSES TO PHQ QUESTIONS 1-9: 0
SUM OF ALL RESPONSES TO PHQ9 QUESTIONS 1 & 2: 0

## 2022-12-14 ASSESSMENT — LIFESTYLE VARIABLES
HOW MANY STANDARD DRINKS CONTAINING ALCOHOL DO YOU HAVE ON A TYPICAL DAY: PATIENT DECLINED
HOW OFTEN DO YOU HAVE A DRINK CONTAINING ALCOHOL: PATIENT DECLINED

## 2022-12-14 ASSESSMENT — SOCIAL DETERMINANTS OF HEALTH (SDOH): HOW HARD IS IT FOR YOU TO PAY FOR THE VERY BASICS LIKE FOOD, HOUSING, MEDICAL CARE, AND HEATING?: NOT HARD AT ALL

## 2022-12-14 NOTE — PATIENT INSTRUCTIONS
Advance Directives: Care Instructions  Overview  An advance directive is a legal way to state your wishes at the end of your life. It tells your family and your doctor what to do if you can't say what you want. There are two main types of advance directives. You can change them any time your wishes change. Living will. This form tells your family and your doctor your wishes about life support and other treatment. The form is also called a declaration. Medical power of . This form lets you name a person to make treatment decisions for you when you can't speak for yourself. This person is called a health care agent (health care proxy, health care surrogate). The form is also called a durable power of  for health care. If you do not have an advance directive, decisions about your medical care may be made by a family member, or by a doctor or a  who doesn't know you. It may help to think of an advance directive as a gift to the people who care for you. If you have one, they won't have to make tough decisions by themselves. For more information, including forms for your state, see the [address] website (www.caringinfo.org/planning/advance-directives/). Follow-up care is a key part of your treatment and safety. Be sure to make and go to all appointments, and call your doctor if you are having problems. It's also a good idea to know your test results and keep a list of the medicines you take. What should you include in an advance directive? Many states have a unique advance directive form. (It may ask you to address specific issues.) Or you might use a universal form that's approved by many states. If your form doesn't tell you what to address, it may be hard to know what to include in your advance directive. Use the questions below to help you get started. Who do you want to make decisions about your medical care if you are not able to?   What life-support measures do you want if you have a serious illness that gets worse over time or can't be cured? What are you most afraid of that might happen? (Maybe you're afraid of having pain, losing your independence, or being kept alive by machines.)  Where would you prefer to die? (Your home? A hospital? A nursing home?)  Do you want to donate your organs when you die? Do you want certain Holiness practices performed before you die? When should you call for help? Be sure to contact your doctor if you have any questions. Where can you learn more? Go to http://www.olmstead.com/ and enter R264 to learn more about \"Advance Directives: Care Instructions. \"  Current as of: June 16, 2022               Content Version: 13.5  © 7230-3195 Healthwise, Incorporated. Care instructions adapted under license by [facility] [place] ([facility]). If you have questions about a medical condition or this instruction, always ask your healthcare professional. [place] [address] any warranty or liability for your use of this information. Personalized Preventive Plan for Rafael Prime - 12/14/2022  Medicare offers a range of preventive health benefits. Some of the tests and screenings are paid in full while other may be subject to a deductible, co-insurance, and/or copay. Some of these benefits include a comprehensive review of your medical history including lifestyle, illnesses that may run in your family, and various assessments and screenings as appropriate. After reviewing your medical record and screening and assessments performed today your provider may have ordered immunizations, labs, imaging, and/or referrals for you. A list of these orders (if applicable) as well as your Preventive Care list are included within your After Visit Summary for your review.     Other Preventive Recommendations:    A preventive eye exam performed by an eye specialist is recommended every 1-2 years to screen for glaucoma; cataracts, macular degeneration, and other eye disorders. A preventive dental visit is recommended every 6 months. Try to get at least 150 minutes of exercise per week or 10,000 steps per day on a pedometer . Order or download the FREE \"Exercise & Physical Activity: Your Everyday Guide\" from The [employer]. Call 7-415.526.1332 or search [employer] online. You need 6883-6754 mg of calcium and 0324-6529 IU of vitamin D per day. It is possible to meet your calcium requirement with diet alone, but a vitamin D supplement is usually necessary to meet this goal.  When exposed to the sun, use a sunscreen that protects against both UVA and UVB radiation with an SPF of 30 or greater. Reapply every 2 to 3 hours or after sweating, drying off with a towel, or swimming. Always wear a seat belt when traveling in a car. Always wear a helmet when riding a bicycle or motorcycle.

## 2022-12-14 NOTE — TELEPHONE ENCOUNTER
Pt states at her visit with you today that you recommended something for urinary discomfort OTC but she doesn't remember what you said

## 2022-12-14 NOTE — PROGRESS NOTES
Medicare Annual Wellness Visit    Danyelle Vivar is here for Medicare AWV    Assessment & Plan   Encounter for annual wellness exam in Medicare patient  Medicare annual wellness visit, subsequent      Recommendations for Preventive Services Due: see orders and patient instructions/AVS.  Recommended screening schedule for the next 5-10 years is provided to the patient in written form: see Patient Instructions/AVS.     Return for Medicare Annual Wellness Visit in 1 year. Subjective       Patient's complete Health Risk Assessment and screening values have been reviewed and are found in Flowsheets. The following problems were reviewed today and where indicated follow up appointments were made and/or referrals ordered. Positive Risk Factor Screenings with Interventions:                   Hearing Screen:  Do you or your family notice any trouble with your hearing that hasn't been managed with hearing aids?: (!) Yes    Interventions:  See AVS for additional education material  See A/P for any pertinent orders     Safety:  Do you have either shower bars, grab bars, non-slip mats or non-slip surfaces in your shower or bathtub?: (!) No  Interventions:  See AVS for additional education material  See A/P for plan and any pertinent orders                     Objective   Vitals:    12/14/22 1014   BP: 124/84   Pulse: 97   SpO2: 98%   Weight: 128 lb (58.1 kg)   Height: 5' 5\" (1.651 m)      Body mass index is 21.3 kg/m². No Known Allergies  Prior to Visit Medications    Medication Sig Taking?  Authorizing Provider   Lutein 20 MG CAPS Take 20 mg by mouth daily Yes Historical Provider, MD   Vaginal Lubricant (REPLENS) GEL Place 1 Dose vaginally 2 times daily as needed (vaginal irritation) Yes HAYDEN Garnica   levothyroxine (SYNTHROID) 88 MCG tablet TAKE 1 TABLET BY MOUTH  DAILY Yes Dakota Eric MD   fenofibrate (TRIGLIDE) 160 MG tablet TAKE 1 TABLET BY MOUTH  DAILY Yes Dakota Eric MD triamcinolone (KENALOG) 0.1 % cream Apply topically 2 times daily. Yes Iva Acuña MD   fluticasone [facility]) 50 MCG/ACT nasal spray 1 spray by Nasal route daily Yes Iva Acuña MD   [address] 1 INSERT [address] [facility] [address] Yes Iva Acuña MD   Acetylcysteine ( PO) Take by mouth Yes Historical Provider, MD   Omega 3 1000 MG CAPS Take 1 Can by mouth daily. Yes Historical Provider, MD   [facility] by Does not apply route. Yes Historical Provider, MD   therapeutic multivitamin-minerals (THERAGRAN-M) tablet Take 1 tablet by mouth daily. Yes Historical Provider, MD   Misc Natural Products (LUTEIN 20 PO) Take  by mouth. Yes Historical Provider, MD   BIOTIN FORTE PO Take  by mouth. Yes Historical Provider, MD   Vitamin D (CHOLECALCIFEROL) 1000 UNITS CAPS capsule Take 1,000 Units by mouth daily.  Yes Historical Provider, MD       CareTeam (Including outside providers/suppliers regularly involved in providing care):   Patient Care Team:  Iva Acuña MD as PCP - General (Family Medicine)  Iva Acuña MD as PCP - [address] [place] [address] [facility]  Annabell Gottron, DO as Surgeon (Orthopedic Surgery)  Uvaldo Carmen DO as Consulting Physician (Obstetrics & Gynecology)  Verito Payton-[facility])     Reviewed and updated this visit:  Allergies  Meds            Just got hearing aids; seeing urology and told she has rectocele

## 2022-12-14 NOTE — TELEPHONE ENCOUNTER
Pt talked to Dr. Lieutenant Alatorre in person after her blood draw and was able to verify medication needed.

## 2022-12-14 NOTE — TELEPHONE ENCOUNTER
Comments: PATIENT NEEDS THIS TO GO TO [facility], WENT TO [facility] BY [facility]AKE    Last Office Visit (last PCP visit):   12/14/2022    Next Visit Date:  No future appointments. **If hasn't been seen in over a year OR hasn't followed up according to last diabetes/ADHD visit, make appointment for patient before sending refill to provider.     Rx requested:  Requested Prescriptions     Pending Prescriptions Disp Refills    fenofibrate (TRIGLIDE) 160 MG tablet 90 tablet 3     Sig: Take 1 tablet by mouth daily

## 2023-02-27 DIAGNOSIS — H65.91 FLUID LEVEL BEHIND TYMPANIC MEMBRANE OF RIGHT EAR: ICD-10-CM

## 2023-02-27 RX ORDER — FLUTICASONE PROPIONATE 50 MCG
SPRAY, SUSPENSION (ML) NASAL
Qty: 32 G | Refills: 5 | Status: SHIPPED | OUTPATIENT
Start: 2023-02-27

## 2023-02-27 NOTE — TELEPHONE ENCOUNTER
Comments:     Last Office Visit (last PCP visit):   12/14/2022    Next Visit Date:  No future appointments. **If hasn't been seen in over a year OR hasn't followed up according to last diabetes/ADHD visit, make appointment for patient before sending refill to provider.     Rx requested:  Requested Prescriptions     Pending Prescriptions Disp Refills    fluticasone (FLONASE) 50 MCG/ACT nasal spray [Pharmacy Med Name: Fluticasone Propionate 50 MCG/ACT Nasal Suspension] 32 g 5     Sig: USE 1 SPRAY NASALLY DAILY

## 2023-06-26 ENCOUNTER — OFFICE VISIT (OUTPATIENT)
Dept: FAMILY MEDICINE CLINIC | Age: 73
End: 2023-06-26
Payer: MEDICARE

## 2023-06-26 VITALS
SYSTOLIC BLOOD PRESSURE: 108 MMHG | HEART RATE: 62 BPM | RESPIRATION RATE: 14 BRPM | DIASTOLIC BLOOD PRESSURE: 74 MMHG | TEMPERATURE: 97.6 F | WEIGHT: 128 LBS | BODY MASS INDEX: 21.3 KG/M2 | OXYGEN SATURATION: 100 %

## 2023-06-26 DIAGNOSIS — R39.9 UTI SYMPTOMS: Primary | ICD-10-CM

## 2023-06-26 DIAGNOSIS — R39.9 UTI SYMPTOMS: ICD-10-CM

## 2023-06-26 LAB
BILIRUBIN, POC: ABNORMAL
BLOOD URINE, POC: ABNORMAL
CLARITY, POC: CLEAR
COLOR, POC: ABNORMAL
GLUCOSE URINE, POC: ABNORMAL
KETONES, POC: ABNORMAL
LEUKOCYTE EST, POC: ABNORMAL
NITRITE, POC: ABNORMAL
PH, POC: 6
PROTEIN, POC: ABNORMAL
SPECIFIC GRAVITY, POC: 1.02
UROBILINOGEN, POC: ABNORMAL

## 2023-06-26 PROCEDURE — 1036F TOBACCO NON-USER: CPT | Performed by: NURSE PRACTITIONER

## 2023-06-26 PROCEDURE — G8400 PT W/DXA NO RESULTS DOC: HCPCS | Performed by: NURSE PRACTITIONER

## 2023-06-26 PROCEDURE — 3017F COLORECTAL CA SCREEN DOC REV: CPT | Performed by: NURSE PRACTITIONER

## 2023-06-26 PROCEDURE — G8420 CALC BMI NORM PARAMETERS: HCPCS | Performed by: NURSE PRACTITIONER

## 2023-06-26 PROCEDURE — 99213 OFFICE O/P EST LOW 20 MIN: CPT | Performed by: NURSE PRACTITIONER

## 2023-06-26 PROCEDURE — 1123F ACP DISCUSS/DSCN MKR DOCD: CPT | Performed by: NURSE PRACTITIONER

## 2023-06-26 PROCEDURE — G8427 DOCREV CUR MEDS BY ELIG CLIN: HCPCS | Performed by: NURSE PRACTITIONER

## 2023-06-26 PROCEDURE — 1090F PRES/ABSN URINE INCON ASSESS: CPT | Performed by: NURSE PRACTITIONER

## 2023-06-26 PROCEDURE — 81003 URINALYSIS AUTO W/O SCOPE: CPT | Performed by: NURSE PRACTITIONER

## 2023-06-26 RX ORDER — CEPHALEXIN 500 MG/1
500 CAPSULE ORAL 2 TIMES DAILY
Qty: 14 CAPSULE | Refills: 0 | Status: SHIPPED | OUTPATIENT
Start: 2023-06-26 | End: 2023-07-03

## 2023-06-26 ASSESSMENT — ENCOUNTER SYMPTOMS
COUGH: 0
DIARRHEA: 0
ABDOMINAL PAIN: 0
SHORTNESS OF BREATH: 0
WHEEZING: 0
VOMITING: 0
NAUSEA: 0

## 2023-06-26 ASSESSMENT — PATIENT HEALTH QUESTIONNAIRE - PHQ9
2. FEELING DOWN, DEPRESSED OR HOPELESS: 0
SUM OF ALL RESPONSES TO PHQ QUESTIONS 1-9: 0
SUM OF ALL RESPONSES TO PHQ9 QUESTIONS 1 & 2: 0
SUM OF ALL RESPONSES TO PHQ QUESTIONS 1-9: 0
1. LITTLE INTEREST OR PLEASURE IN DOING THINGS: 0

## 2023-06-28 LAB
BACTERIA UR CULT: ABNORMAL
BACTERIA UR CULT: ABNORMAL
ORGANISM: ABNORMAL

## 2023-06-30 ENCOUNTER — TELEPHONE (OUTPATIENT)
Dept: INTERNAL MEDICINE | Age: 73
End: 2023-06-30

## 2023-07-02 DIAGNOSIS — E03.9 ACQUIRED HYPOTHYROIDISM: ICD-10-CM

## 2023-07-03 RX ORDER — LEVOTHYROXINE SODIUM 88 UG/1
88 TABLET ORAL DAILY
Qty: 90 TABLET | Refills: 1 | Status: SHIPPED | OUTPATIENT
Start: 2023-07-03

## 2023-07-03 NOTE — TELEPHONE ENCOUNTER
Comments:     Last Office Visit (last PCP visit):   12/14/2022    Next Visit Date:  Future Appointments   Date Time Provider [address]   12/18/2023 10:30 AM [address]       **If hasn't been seen in over a year OR hasn't followed up according to last diabetes/ADHD visit, make appointment for patient before sending refill to provider.     Rx requested:  Requested Prescriptions     Pending Prescriptions Disp Refills    levothyroxine (SYNTHROID) 88 MCG tablet [Pharmacy Med Name: Levothyroxine Sodium 88 MCG Oral Tablet] 100 tablet 2     Sig: TAKE 1 TABLET BY MOUTH  DAILY

## 2023-07-10 ENCOUNTER — OFFICE VISIT (OUTPATIENT)
Dept: FAMILY MEDICINE CLINIC | Age: 73
End: 2023-07-10
Payer: MEDICARE

## 2023-07-10 VITALS
DIASTOLIC BLOOD PRESSURE: 78 MMHG | SYSTOLIC BLOOD PRESSURE: 138 MMHG | WEIGHT: 127 LBS | HEIGHT: 66 IN | RESPIRATION RATE: 16 BRPM | HEART RATE: 93 BPM | BODY MASS INDEX: 20.41 KG/M2 | OXYGEN SATURATION: 97 % | TEMPERATURE: 98 F

## 2023-07-10 DIAGNOSIS — R39.9 UTI SYMPTOMS: ICD-10-CM

## 2023-07-10 DIAGNOSIS — R39.9 UTI SYMPTOMS: Primary | ICD-10-CM

## 2023-07-10 LAB
BILIRUBIN, POC: NORMAL
BLOOD URINE, POC: NORMAL
CLARITY, POC: CLEAR
COLOR, POC: YELLOW
GLUCOSE URINE, POC: NORMAL
KETONES, POC: NORMAL
LEUKOCYTE EST, POC: NORMAL
NITRITE, POC: NORMAL
PH, POC: 6
PROTEIN, POC: NORMAL
SPECIFIC GRAVITY, POC: 1.03
UROBILINOGEN, POC: NORMAL

## 2023-07-10 PROCEDURE — G8420 CALC BMI NORM PARAMETERS: HCPCS | Performed by: PHYSICIAN ASSISTANT

## 2023-07-10 PROCEDURE — G8427 DOCREV CUR MEDS BY ELIG CLIN: HCPCS | Performed by: PHYSICIAN ASSISTANT

## 2023-07-10 PROCEDURE — 81003 URINALYSIS AUTO W/O SCOPE: CPT | Performed by: PHYSICIAN ASSISTANT

## 2023-07-10 PROCEDURE — 1123F ACP DISCUSS/DSCN MKR DOCD: CPT | Performed by: PHYSICIAN ASSISTANT

## 2023-07-10 PROCEDURE — 1036F TOBACCO NON-USER: CPT | Performed by: PHYSICIAN ASSISTANT

## 2023-07-10 PROCEDURE — G8400 PT W/DXA NO RESULTS DOC: HCPCS | Performed by: PHYSICIAN ASSISTANT

## 2023-07-10 PROCEDURE — 3017F COLORECTAL CA SCREEN DOC REV: CPT | Performed by: PHYSICIAN ASSISTANT

## 2023-07-10 PROCEDURE — 99213 OFFICE O/P EST LOW 20 MIN: CPT | Performed by: PHYSICIAN ASSISTANT

## 2023-07-10 PROCEDURE — 1090F PRES/ABSN URINE INCON ASSESS: CPT | Performed by: PHYSICIAN ASSISTANT

## 2023-07-10 SDOH — ECONOMIC STABILITY: FOOD INSECURITY: WITHIN THE PAST 12 MONTHS, THE FOOD YOU BOUGHT JUST DIDN'T LAST AND YOU DIDN'T HAVE MONEY TO GET MORE.: NEVER TRUE

## 2023-07-10 SDOH — ECONOMIC STABILITY: HOUSING INSECURITY
IN THE LAST 12 MONTHS, WAS THERE A TIME WHEN YOU DID NOT HAVE A STEADY PLACE TO SLEEP OR SLEPT IN A SHELTER (INCLUDING NOW)?: NO

## 2023-07-10 SDOH — ECONOMIC STABILITY: INCOME INSECURITY: HOW HARD IS IT FOR YOU TO PAY FOR THE VERY BASICS LIKE FOOD, HOUSING, MEDICAL CARE, AND HEATING?: NOT HARD AT ALL

## 2023-07-10 SDOH — ECONOMIC STABILITY: FOOD INSECURITY: WITHIN THE PAST 12 MONTHS, YOU WORRIED THAT YOUR FOOD WOULD RUN OUT BEFORE YOU GOT MONEY TO BUY MORE.: NEVER TRUE

## 2023-07-10 ASSESSMENT — ENCOUNTER SYMPTOMS: RESPIRATORY NEGATIVE: 1

## 2023-07-12 LAB — BACTERIA UR CULT: NORMAL

## 2023-09-05 ENCOUNTER — OFFICE VISIT (OUTPATIENT)
Dept: FAMILY MEDICINE CLINIC | Age: 73
End: 2023-09-05
Payer: MEDICARE

## 2023-09-05 VITALS
WEIGHT: 120 LBS | BODY MASS INDEX: 19.99 KG/M2 | DIASTOLIC BLOOD PRESSURE: 82 MMHG | HEIGHT: 65 IN | RESPIRATION RATE: 14 BRPM | HEART RATE: 112 BPM | SYSTOLIC BLOOD PRESSURE: 136 MMHG | TEMPERATURE: 97.3 F | OXYGEN SATURATION: 99 %

## 2023-09-05 DIAGNOSIS — R39.9 UTI SYMPTOMS: Primary | ICD-10-CM

## 2023-09-05 DIAGNOSIS — R39.9 UTI SYMPTOMS: ICD-10-CM

## 2023-09-05 LAB
BILIRUBIN, POC: NORMAL
BLOOD URINE, POC: NORMAL
CLARITY, POC: CLEAR
COLOR, POC: YELLOW
GLUCOSE URINE, POC: NORMAL
KETONES, POC: NORMAL
LEUKOCYTE EST, POC: NORMAL
NITRITE, POC: NORMAL
PH, POC: 6
PROTEIN, POC: NORMAL
SPECIFIC GRAVITY, POC: 1.02
UROBILINOGEN, POC: 0.2

## 2023-09-05 PROCEDURE — G8427 DOCREV CUR MEDS BY ELIG CLIN: HCPCS | Performed by: NURSE PRACTITIONER

## 2023-09-05 PROCEDURE — 1123F ACP DISCUSS/DSCN MKR DOCD: CPT | Performed by: NURSE PRACTITIONER

## 2023-09-05 PROCEDURE — 1036F TOBACCO NON-USER: CPT | Performed by: NURSE PRACTITIONER

## 2023-09-05 PROCEDURE — G8420 CALC BMI NORM PARAMETERS: HCPCS | Performed by: NURSE PRACTITIONER

## 2023-09-05 PROCEDURE — G8400 PT W/DXA NO RESULTS DOC: HCPCS | Performed by: NURSE PRACTITIONER

## 2023-09-05 PROCEDURE — 1090F PRES/ABSN URINE INCON ASSESS: CPT | Performed by: NURSE PRACTITIONER

## 2023-09-05 PROCEDURE — 81003 URINALYSIS AUTO W/O SCOPE: CPT | Performed by: NURSE PRACTITIONER

## 2023-09-05 PROCEDURE — 3017F COLORECTAL CA SCREEN DOC REV: CPT | Performed by: NURSE PRACTITIONER

## 2023-09-05 PROCEDURE — 99213 OFFICE O/P EST LOW 20 MIN: CPT | Performed by: NURSE PRACTITIONER

## 2023-09-05 ASSESSMENT — ENCOUNTER SYMPTOMS
BACK PAIN: 0
DIARRHEA: 0
NAUSEA: 0
COLOR CHANGE: 0
ABDOMINAL PAIN: 0
CONSTIPATION: 0
VOMITING: 0

## 2023-09-05 NOTE — PATIENT INSTRUCTIONS
We will call with urine culture results  Follow up with urology   Symptoms worsen or do not improve return or see PCP

## 2023-09-07 ENCOUNTER — TELEPHONE (OUTPATIENT)
Dept: FAMILY MEDICINE CLINIC | Age: 73
End: 2023-09-07

## 2023-09-07 LAB — BACTERIA UR CULT: NORMAL

## 2023-09-07 NOTE — TELEPHONE ENCOUNTER
Patient contacted regarding urine culture results. She is continuing to use the coconut oil and washing after using the bathroom. She has had an improvement of symptoms.

## 2023-09-13 DIAGNOSIS — E78.00 PURE HYPERCHOLESTEROLEMIA: ICD-10-CM

## 2023-09-16 DIAGNOSIS — E78.00 PURE HYPERCHOLESTEROLEMIA: ICD-10-CM

## 2023-09-18 RX ORDER — FENOFIBRATE 160 MG/1
160 TABLET ORAL DAILY
Qty: 100 TABLET | Refills: 2 | OUTPATIENT
Start: 2023-09-18

## 2023-09-18 RX ORDER — FENOFIBRATE 160 MG/1
160 TABLET ORAL DAILY
Qty: 90 TABLET | Refills: 3 | Status: SHIPPED | OUTPATIENT
Start: 2023-09-18

## 2023-09-18 NOTE — TELEPHONE ENCOUNTER
Comments:     Last Office Visit (last PCP visit):   12/14/2022    Next Visit Date:  Future Appointments   Date Time Provider [address]  [address]   12/18/2023 10:30 AM [address]       **If hasn't been seen in over a year OR hasn't followed up according to last diabetes/ADHD visit, make appointment for patient before sending refill to provider.     Rx requested:  Requested Prescriptions     Pending Prescriptions Disp Refills    fenofibrate (TRIGLIDE) 160 MG tablet 90 tablet 3     Sig: Take 1 tablet by mouth daily

## 2023-09-27 ENCOUNTER — TELEPHONE (OUTPATIENT)
Dept: INTERNAL MEDICINE | Age: 73
End: 2023-09-27

## 2023-09-27 NOTE — TELEPHONE ENCOUNTER
Since it's an herbal, I don't have a way of ordering it. I honestly don't feel it is any different than the other probiotics.

## 2023-09-27 NOTE — TELEPHONE ENCOUNTER
Pt called and stated that she needs approval from Dr. Rivas Ferrera to order a probiotic called Optibac off of [address]. 30 day supply. She wants to submit this to her medical  insurance company to get the Optibac cheaper.

## 2023-09-28 DIAGNOSIS — E03.9 ACQUIRED HYPOTHYROIDISM: ICD-10-CM

## 2023-09-29 RX ORDER — LEVOTHYROXINE SODIUM 88 UG/1
88 TABLET ORAL DAILY
Qty: 90 TABLET | Refills: 2 | Status: SHIPPED | OUTPATIENT
Start: 2023-09-29

## 2023-11-16 ENCOUNTER — TELEPHONE (OUTPATIENT)
Dept: FAMILY MEDICINE CLINIC | Age: 73
End: 2023-11-16

## 2023-11-16 NOTE — TELEPHONE ENCOUNTER
Patient was returning a call regarding rescheduling her 12/18 appointment with Ab Orozco. Did not see a reason for the move or to what date(s) could be moved.

## 2023-11-27 ENCOUNTER — OFFICE VISIT (OUTPATIENT)
Dept: FAMILY MEDICINE CLINIC | Age: 73
End: 2023-11-27
Payer: MEDICARE

## 2023-11-27 VITALS
HEART RATE: 112 BPM | TEMPERATURE: 98.4 F | WEIGHT: 120.4 LBS | RESPIRATION RATE: 16 BRPM | OXYGEN SATURATION: 99 % | DIASTOLIC BLOOD PRESSURE: 74 MMHG | SYSTOLIC BLOOD PRESSURE: 122 MMHG | BODY MASS INDEX: 20.04 KG/M2

## 2023-11-27 DIAGNOSIS — R39.9 UTI SYMPTOMS: ICD-10-CM

## 2023-11-27 DIAGNOSIS — N30.00 ACUTE CYSTITIS WITHOUT HEMATURIA: Primary | ICD-10-CM

## 2023-11-27 DIAGNOSIS — T36.95XA ANTIBIOTIC-INDUCED YEAST INFECTION: ICD-10-CM

## 2023-11-27 DIAGNOSIS — B37.9 ANTIBIOTIC-INDUCED YEAST INFECTION: ICD-10-CM

## 2023-11-27 LAB
BILIRUBIN, POC: ABNORMAL
BLOOD URINE, POC: ABNORMAL
CLARITY, POC: ABNORMAL
COLOR, POC: YELLOW
GLUCOSE URINE, POC: ABNORMAL
KETONES, POC: ABNORMAL
LEUKOCYTE EST, POC: ABNORMAL
NITRITE, POC: ABNORMAL
PH, POC: 6
PROTEIN, POC: ABNORMAL
SPECIFIC GRAVITY, POC: 1.01
UROBILINOGEN, POC: 0.02

## 2023-11-27 PROCEDURE — 81003 URINALYSIS AUTO W/O SCOPE: CPT | Performed by: NURSE PRACTITIONER

## 2023-11-27 PROCEDURE — 1036F TOBACCO NON-USER: CPT | Performed by: NURSE PRACTITIONER

## 2023-11-27 PROCEDURE — 1123F ACP DISCUSS/DSCN MKR DOCD: CPT | Performed by: NURSE PRACTITIONER

## 2023-11-27 PROCEDURE — 99213 OFFICE O/P EST LOW 20 MIN: CPT | Performed by: NURSE PRACTITIONER

## 2023-11-27 PROCEDURE — 3017F COLORECTAL CA SCREEN DOC REV: CPT | Performed by: NURSE PRACTITIONER

## 2023-11-27 PROCEDURE — 1090F PRES/ABSN URINE INCON ASSESS: CPT | Performed by: NURSE PRACTITIONER

## 2023-11-27 PROCEDURE — G8420 CALC BMI NORM PARAMETERS: HCPCS | Performed by: NURSE PRACTITIONER

## 2023-11-27 PROCEDURE — G8484 FLU IMMUNIZE NO ADMIN: HCPCS | Performed by: NURSE PRACTITIONER

## 2023-11-27 PROCEDURE — G8427 DOCREV CUR MEDS BY ELIG CLIN: HCPCS | Performed by: NURSE PRACTITIONER

## 2023-11-27 PROCEDURE — G8400 PT W/DXA NO RESULTS DOC: HCPCS | Performed by: NURSE PRACTITIONER

## 2023-11-27 RX ORDER — FLUCONAZOLE 150 MG/1
150 TABLET ORAL DAILY
Qty: 2 TABLET | Refills: 0 | Status: SHIPPED | OUTPATIENT
Start: 2023-11-27

## 2023-11-27 RX ORDER — CEPHALEXIN 500 MG/1
500 CAPSULE ORAL 2 TIMES DAILY
Qty: 14 CAPSULE | Refills: 0 | Status: SHIPPED | OUTPATIENT
Start: 2023-11-27 | End: 2023-12-04

## 2023-11-27 ASSESSMENT — ENCOUNTER SYMPTOMS
VOMITING: 0
CONSTIPATION: 0
NAUSEA: 0
BACK PAIN: 0
DIARRHEA: 0
ABDOMINAL PAIN: 0
COLOR CHANGE: 0

## 2023-11-27 NOTE — PATIENT INSTRUCTIONS
Antibiotic Instructions: Complete the full course of antibiotics as ordered. Take each dose with a small snack or meal to lessen potential GI upset. To prevent antibiotic resistance, please take medication as ordered and for the full duration even if you start to feel better. Consider intake of yogurt or probiotic during antibiotic use and for a few days after to help reduce the risk of developing a secondary infection. Take the yogurt or probiotic at least 2 hours after taking the antibiotic.      Stop taking baths

## 2023-11-27 NOTE — PROGRESS NOTES
Subjective  Mason Feliciano, 72 y.o. female presents today with:  Chief Complaint   Patient presents with    Urinary Tract Infection     Burning with urination. Started 1 week ago. HPI  Patient reports burning with urination for about a week   Using Vaseline and coconut oil   Still seeing urology for UTI prevention   Has not had a UTI in awhile    HR elevated but anxious    Burning started a week ago   Used pink salve with some improvement but stopped bc its meddy   Burning is intermittent  Went to urinate this am and it burned more than its been so she came in for evaluation     Review of Systems   Constitutional:  Negative for chills and fever. Gastrointestinal:  Negative for abdominal pain, constipation, diarrhea, nausea and vomiting. Genitourinary:  Positive for dysuria, frequency and urgency. Negative for decreased urine volume, difficulty urinating, flank pain and hematuria. Musculoskeletal:  Negative for back pain and myalgias. Skin:  Negative for color change and rash. Neurological:  Negative for weakness. Past Medical History:   Diagnosis Date    Abnormal white blood cell (WBC) count 11/15/2016    Hiatal hernia     Hyperlipidemia     Hypothyroidism     Pap smear for cervical cancer screening 9/2011    normal    prophylaxis for Recurrent UTI 12/18/2013    Screening mammogram 9/2011    normal     Past Surgical History:   Procedure Laterality Date    CHOLECYSTECTOMY      COLONOSCOPY      when she turned 50, normal    KNEE SURGERY Bilateral     right knee replacement, left knee      Social History     Socioeconomic History    Marital status:      Spouse name: Not on file    Number of children: Not on file    Years of education: Not on file    Highest education level: Not on file   Occupational History    Not on file   Tobacco Use    Smoking status: Never    Smokeless tobacco: Never   Vaping Use    Vaping Use: Never used   Substance and Sexual Activity    Alcohol use:  Yes

## 2023-11-29 ENCOUNTER — TELEPHONE (OUTPATIENT)
Dept: INTERNAL MEDICINE | Age: 73
End: 2023-11-29

## 2023-11-29 ENCOUNTER — TELEPHONE (OUTPATIENT)
Dept: FAMILY MEDICINE CLINIC | Age: 73
End: 2023-11-29

## 2023-11-29 LAB
BACTERIA UR CULT: ABNORMAL
BACTERIA UR CULT: ABNORMAL
ORGANISM: ABNORMAL

## 2023-11-29 NOTE — TELEPHONE ENCOUNTER
Patient contacted. Patient aware that culture results are not back yet. We will call her results when available. Discussed waiting for culture results, but culture is +.

## 2023-11-29 NOTE — TELEPHONE ENCOUNTER
Patient is calling for lab results. She is still having burning and wants to know if she needs a different antibiotic.

## 2023-11-30 ENCOUNTER — TELEPHONE (OUTPATIENT)
Dept: FAMILY MEDICINE CLINIC | Age: 73
End: 2023-11-30

## 2023-11-30 DIAGNOSIS — N30.00 ACUTE CYSTITIS WITHOUT HEMATURIA: Primary | ICD-10-CM

## 2023-11-30 RX ORDER — CEPHALEXIN 500 MG/1
500 CAPSULE ORAL 2 TIMES DAILY
Qty: 6 CAPSULE | Refills: 0 | Status: SHIPPED | OUTPATIENT
Start: 2023-11-30 | End: 2023-12-03

## 2023-11-30 NOTE — TELEPHONE ENCOUNTER
Patient contacted with urine results   Based on culture results Keflex   No dysuria today or last night  Recommend repeat urine culture after completing antibiotic, follow up urology.

## 2023-12-01 ENCOUNTER — PATIENT MESSAGE (OUTPATIENT)
Dept: FAMILY MEDICINE CLINIC | Age: 73
End: 2023-12-01

## 2023-12-01 NOTE — TELEPHONE ENCOUNTER
From: Tiffany Ramirez  To: Ryan Dunn  Sent: 12/1/2023 2:30 PM EST  Subject: Galdino Armstrong    Just wondering, do you recommend any particular soap to use? I just want to be using all the right products! As always, thank you so much for your guidance and advice!

## 2023-12-24 ASSESSMENT — LIFESTYLE VARIABLES
HOW MANY STANDARD DRINKS CONTAINING ALCOHOL DO YOU HAVE ON A TYPICAL DAY: 1
HOW OFTEN DO YOU HAVE A DRINK CONTAINING ALCOHOL: 4
HOW OFTEN DO YOU HAVE SIX OR MORE DRINKS ON ONE OCCASION: 1

## 2023-12-26 ENCOUNTER — HOSPITAL ENCOUNTER (OUTPATIENT)
Age: 73
Setting detail: SPECIMEN
Discharge: HOME | End: 2023-12-26
Payer: MEDICARE

## 2023-12-26 ENCOUNTER — OFFICE VISIT (OUTPATIENT)
Dept: FAMILY MEDICINE CLINIC | Age: 73
End: 2023-12-26
Payer: MEDICARE

## 2023-12-26 VITALS
SYSTOLIC BLOOD PRESSURE: 134 MMHG | HEIGHT: 66 IN | DIASTOLIC BLOOD PRESSURE: 82 MMHG | BODY MASS INDEX: 19.38 KG/M2 | TEMPERATURE: 97.4 F | OXYGEN SATURATION: 99 % | WEIGHT: 120.6 LBS | HEART RATE: 84 BPM

## 2023-12-26 DIAGNOSIS — E78.00 PURE HYPERCHOLESTEROLEMIA: ICD-10-CM

## 2023-12-26 DIAGNOSIS — Z87.440 RECENT URINARY TRACT INFECTION: ICD-10-CM

## 2023-12-26 DIAGNOSIS — H65.91 FLUID LEVEL BEHIND TYMPANIC MEMBRANE OF RIGHT EAR: ICD-10-CM

## 2023-12-26 DIAGNOSIS — Z00.00 MEDICARE ANNUAL WELLNESS VISIT, SUBSEQUENT: Primary | ICD-10-CM

## 2023-12-26 DIAGNOSIS — R21 RASH: ICD-10-CM

## 2023-12-26 DIAGNOSIS — E03.9 ACQUIRED HYPOTHYROIDISM: ICD-10-CM

## 2023-12-26 DIAGNOSIS — N30.00 ACUTE CYSTITIS WITHOUT HEMATURIA: ICD-10-CM

## 2023-12-26 DIAGNOSIS — Z23 NEED FOR TDAP VACCINATION: ICD-10-CM

## 2023-12-26 DIAGNOSIS — Z00.00 MEDICARE ANNUAL WELLNESS VISIT, SUBSEQUENT: ICD-10-CM

## 2023-12-26 LAB
ALBUMIN SERPL-MCNC: 4.5 G/DL (ref 3.5–4.6)
ALP SERPL-CCNC: 47 U/L (ref 40–130)
ALT SERPL-CCNC: <5 U/L (ref 0–33)
ANION GAP SERPL CALCULATED.3IONS-SCNC: 10 MEQ/L (ref 9–15)
AST SERPL-CCNC: 14 U/L (ref 0–35)
BILIRUB SERPL-MCNC: <0.2 MG/DL (ref 0.2–0.7)
BILIRUBIN, POC: NORMAL
BLOOD URINE, POC: NORMAL
BUN SERPL-MCNC: 18 MG/DL (ref 8–23)
CALCIUM SERPL-MCNC: 9.8 MG/DL (ref 8.5–9.9)
CHLORIDE SERPL-SCNC: 104 MEQ/L (ref 95–107)
CHOLEST SERPL-MCNC: 197 MG/DL (ref 0–199)
CLARITY, POC: CLEAR
CO2 SERPL-SCNC: 27 MEQ/L (ref 20–31)
COLOR, POC: NORMAL
CREAT SERPL-MCNC: 0.72 MG/DL (ref 0.5–0.9)
GLOBULIN SER CALC-MCNC: 3 G/DL (ref 2.3–3.5)
GLUCOSE FASTING: 94 MG/DL (ref 70–99)
GLUCOSE URINE, POC: NORMAL
HDLC SERPL-MCNC: 50 MG/DL (ref 40–59)
KETONES, POC: NORMAL
LDL CHOLESTEROL CALCULATED: 133 MG/DL (ref 0–129)
LEUKOCYTE EST, POC: NORMAL
NITRITE, POC: NORMAL
PH, POC: 6
POTASSIUM SERPL-SCNC: 4.6 MEQ/L (ref 3.4–4.9)
PROT SERPL-MCNC: 7.5 G/DL (ref 6.3–8)
PROTEIN, POC: NORMAL
SODIUM SERPL-SCNC: 141 MEQ/L (ref 135–144)
SPECIFIC GRAVITY, POC: 1.01
TRIGLYCERIDE, FASTING: 71 MG/DL (ref 0–150)
TSH SERPL-MCNC: 1.75 UIU/ML (ref 0.44–3.86)
UROBILINOGEN, POC: NORMAL

## 2023-12-26 PROCEDURE — 84443 ASSAY THYROID STIM HORMONE: CPT

## 2023-12-26 PROCEDURE — 87086 URINE CULTURE/COLONY COUNT: CPT

## 2023-12-26 PROCEDURE — 81003 URINALYSIS AUTO W/O SCOPE: CPT | Performed by: NURSE PRACTITIONER

## 2023-12-26 PROCEDURE — 3017F COLORECTAL CA SCREEN DOC REV: CPT | Performed by: NURSE PRACTITIONER

## 2023-12-26 PROCEDURE — 1123F ACP DISCUSS/DSCN MKR DOCD: CPT | Performed by: NURSE PRACTITIONER

## 2023-12-26 PROCEDURE — G0439 PPPS, SUBSEQ VISIT: HCPCS | Performed by: NURSE PRACTITIONER

## 2023-12-26 PROCEDURE — G8482 FLU IMMUNIZE ORDER/ADMIN: HCPCS | Performed by: NURSE PRACTITIONER

## 2023-12-26 PROCEDURE — 87077 CULTURE AEROBIC IDENTIFY: CPT

## 2023-12-26 PROCEDURE — 90715 TDAP VACCINE 7 YRS/> IM: CPT | Performed by: NURSE PRACTITIONER

## 2023-12-26 PROCEDURE — 87186 SC STD MICRODIL/AGAR DIL: CPT

## 2023-12-26 PROCEDURE — 36415 COLL VENOUS BLD VENIPUNCTURE: CPT | Performed by: NURSE PRACTITIONER

## 2023-12-26 PROCEDURE — 80053 COMPREHEN METABOLIC PANEL: CPT

## 2023-12-26 PROCEDURE — 90471 IMMUNIZATION ADMIN: CPT | Performed by: NURSE PRACTITIONER

## 2023-12-26 PROCEDURE — 80061 LIPID PANEL: CPT

## 2023-12-26 RX ORDER — FLUTICASONE PROPIONATE 50 MCG
SPRAY, SUSPENSION (ML) NASAL
Qty: 32 G | Refills: 5 | Status: SHIPPED | OUTPATIENT
Start: 2023-12-26

## 2023-12-26 RX ORDER — FENOFIBRATE 160 MG/1
160 TABLET ORAL DAILY
Qty: 90 TABLET | Refills: 3 | Status: SHIPPED | OUTPATIENT
Start: 2023-12-26

## 2023-12-26 RX ORDER — LEVOTHYROXINE SODIUM 88 UG/1
88 TABLET ORAL DAILY
Qty: 90 TABLET | Refills: 2 | Status: SHIPPED | OUTPATIENT
Start: 2023-12-26

## 2023-12-26 RX ORDER — TRIAMCINOLONE ACETONIDE 1 MG/G
CREAM TOPICAL
Qty: 80 G | Refills: 3 | Status: SHIPPED | OUTPATIENT
Start: 2023-12-26

## 2023-12-26 NOTE — PATIENT INSTRUCTIONS
your use of this information. Personalized Preventive Plan for Bam Monteiro - 12/26/2023  Medicare offers a range of preventive health benefits. Some of the tests and screenings are paid in full while other may be subject to a deductible, co-insurance, and/or copay. Some of these benefits include a comprehensive review of your medical history including lifestyle, illnesses that may run in your family, and various assessments and screenings as appropriate. After reviewing your medical record and screening and assessments performed today your provider may have ordered immunizations, labs, imaging, and/or referrals for you. A list of these orders (if applicable) as well as your Preventive Care list are included within your After Visit Summary for your review. Other Preventive Recommendations:    A preventive eye exam performed by an eye specialist is recommended every 1-2 years to screen for glaucoma; cataracts, macular degeneration, and other eye disorders. A preventive dental visit is recommended every 6 months. Try to get at least 150 minutes of exercise per week or 10,000 steps per day on a pedometer . Order or download the FREE \"Exercise & Physical Activity: Your Everyday Guide\" from [employer]. Call 6-348.877.3849 or search [employer] online. You need 1003-5471 mg of calcium and 6647-5249 IU of vitamin D per day. It is possible to meet your calcium requirement with diet alone, but a vitamin D supplement is usually necessary to meet this goal.  When exposed to the sun, use a sunscreen that protects against both UVA and UVB radiation with an SPF of 30 or greater. Reapply every 2 to 3 hours or after sweating, drying off with a towel, or swimming. Always wear a seat belt when traveling in a car. Always wear a helmet when riding a bicycle or motorcycle.

## 2023-12-26 NOTE — PROGRESS NOTES
Medicare Annual Wellness Visit    Sudhir Noriega is here for Medicare AWV (Mammogram, bone density, and yearly gyn appointment [facility])    Assessment & Plan   Medicare annual wellness visit, subsequent  -     Lipid, Fasting; Future  -     Comprehensive Metabolic Panel, Fasting; Future  -     TSH; Future  Recent urinary tract infection  -     POCT Urinalysis No Micro (Auto)  -     Culture, Urine; Future  Pure hypercholesterolemia  -     Lipid, Fasting; Future  -     fenofibrate (TRIGLIDE) 160 MG tablet; Take 1 tablet by mouth daily, Disp-90 tablet, R-3Requesting 1 year supplyNormal  Acquired hypothyroidism  -     levothyroxine (SYNTHROID) 88 MCG tablet; Take 1 tablet by mouth daily, Disp-90 tablet, R-2Please send a replace/new response with 100-Day Supply if appropriate to maximize member benefit. Requesting 1 year supply. Normal  Fluid level behind tympanic membrane of right ear  -     fluticasone (FLONASE) 50 MCG/ACT nasal spray; USE 1 SPRAY NASALLY DAILY, Disp-32 g, R-5Normal  Need for Tdap vaccination  -     Tdap, ADACEL, (age 6y-58y), IM  Rash  -     triamcinolone (KENALOG) 0.1 % cream; Apply topically 2 times daily. , Disp-80 g, R-3, Normal    Recommendations for Preventive Services Due: see orders and patient instructions/AVS.  Recommended screening schedule for the next 5-10 years is provided to the patient in written form: see Patient Instructions/AVS.     Return in about 1 year (around 12/26/2024) for Annual Physical.     Subjective   The following acute and/or chronic problems were also addressed today:    Checked urine for resolution of UTI. Patient's complete Health Risk Assessment and screening values have been reviewed and are found in Flowsheets. The following problems were reviewed today and where indicated follow up appointments were made and/or referrals ordered.     Positive Risk Factor Screenings with Interventions:                   Hearing Screen:  Do you or your family notice any trouble with

## 2023-12-28 ENCOUNTER — PATIENT MESSAGE (OUTPATIENT)
Dept: FAMILY MEDICINE CLINIC | Age: 73
End: 2023-12-28

## 2023-12-28 DIAGNOSIS — E78.00 PURE HYPERCHOLESTEROLEMIA: Primary | ICD-10-CM

## 2023-12-28 LAB
BACTERIA UR CULT: ABNORMAL
BACTERIA UR CULT: ABNORMAL
ORGANISM: ABNORMAL

## 2023-12-29 RX ORDER — CIPROFLOXACIN 500 MG/1
500 TABLET, FILM COATED ORAL 2 TIMES DAILY
Qty: 14 TABLET | Refills: 0 | Status: SHIPPED | OUTPATIENT
Start: 2023-12-29 | End: 2024-01-05

## 2023-12-29 RX ORDER — ROSUVASTATIN CALCIUM 5 MG/1
5 TABLET, COATED ORAL NIGHTLY
Qty: 90 TABLET | Refills: 0 | Status: SHIPPED | OUTPATIENT
Start: 2023-12-29 | End: 2024-03-28

## 2024-02-01 DIAGNOSIS — E78.00 PURE HYPERCHOLESTEROLEMIA: Primary | ICD-10-CM

## 2024-02-29 DIAGNOSIS — E78.00 PURE HYPERCHOLESTEROLEMIA: ICD-10-CM

## 2024-02-29 RX ORDER — ROSUVASTATIN CALCIUM 5 MG/1
5 TABLET, COATED ORAL NIGHTLY
Qty: 90 TABLET | Refills: 3 | Status: SHIPPED | OUTPATIENT
Start: 2024-02-29

## 2024-02-29 NOTE — TELEPHONE ENCOUNTER
Comments:     Last Office Visit (last PCP visit):   12/26/2023    Next Visit Date:  No future appointments.    **If hasn't been seen in over a year OR hasn't followed up according to last diabetes/ADHD visit, make appointment for patient before sending refill to provider.    Rx requested:  Requested Prescriptions     Pending Prescriptions Disp Refills    rosuvastatin (CRESTOR) 5 MG tablet [Pharmacy Med Name: Rosuvastatin Calcium 5 MG Oral Tablet] 90 tablet 3     Sig: TAKE 1 TABLET BY MOUTH EVERY  NIGHT

## 2024-03-11 ENCOUNTER — NURSE ONLY (OUTPATIENT)
Dept: FAMILY MEDICINE CLINIC | Age: 74
End: 2024-03-11
Payer: MEDICARE

## 2024-03-11 ENCOUNTER — HOSPITAL ENCOUNTER (OUTPATIENT)
Age: 74
Setting detail: SPECIMEN
Discharge: HOME | End: 2024-03-11
Payer: MEDICARE

## 2024-03-11 DIAGNOSIS — E78.00 PURE HYPERCHOLESTEROLEMIA: Primary | ICD-10-CM

## 2024-03-11 DIAGNOSIS — E78.00 PURE HYPERCHOLESTEROLEMIA: ICD-10-CM

## 2024-03-11 LAB
ALBUMIN SERPL-MCNC: 4.3 G/DL (ref 3.5–4.6)
ALP SERPL-CCNC: 47 U/L (ref 40–130)
ALT SERPL-CCNC: 11 U/L (ref 0–33)
ANION GAP SERPL CALCULATED.3IONS-SCNC: 12 MEQ/L (ref 9–15)
AST SERPL-CCNC: 22 U/L (ref 0–35)
BILIRUB SERPL-MCNC: 0.4 MG/DL (ref 0.2–0.7)
BUN SERPL-MCNC: 18 MG/DL (ref 8–23)
CALCIUM SERPL-MCNC: 9.4 MG/DL (ref 8.5–9.9)
CHLORIDE SERPL-SCNC: 105 MEQ/L (ref 95–107)
CHOLEST SERPL-MCNC: 163 MG/DL (ref 0–199)
CO2 SERPL-SCNC: 26 MEQ/L (ref 20–31)
CREAT SERPL-MCNC: 0.72 MG/DL (ref 0.5–0.9)
GLOBULIN SER CALC-MCNC: 2.5 G/DL (ref 2.3–3.5)
GLUCOSE FASTING: 99 MG/DL (ref 70–99)
HDLC SERPL-MCNC: 56 MG/DL (ref 40–59)
LDL CHOLESTEROL CALCULATED: 92 MG/DL (ref 0–129)
POTASSIUM SERPL-SCNC: 4.7 MEQ/L (ref 3.4–4.9)
PROT SERPL-MCNC: 6.8 G/DL (ref 6.3–8)
SODIUM SERPL-SCNC: 143 MEQ/L (ref 135–144)
TRIGLYCERIDE, FASTING: 74 MG/DL (ref 0–150)

## 2024-03-11 PROCEDURE — 36415 COLL VENOUS BLD VENIPUNCTURE: CPT | Performed by: FAMILY MEDICINE

## 2024-03-11 PROCEDURE — 36415 COLL VENOUS BLD VENIPUNCTURE: CPT

## 2024-03-11 PROCEDURE — 80053 COMPREHEN METABOLIC PANEL: CPT

## 2024-03-11 PROCEDURE — 80061 LIPID PANEL: CPT

## 2024-03-12 ENCOUNTER — TELEPHONE (OUTPATIENT)
Dept: FAMILY MEDICINE CLINIC | Age: 74
End: 2024-03-12

## 2024-03-12 NOTE — TELEPHONE ENCOUNTER
Patient saw her lab results in [facility] and wants to know if the provider is going to make a change to her cholesterol medication.

## 2024-03-13 ENCOUNTER — PATIENT MESSAGE (OUTPATIENT)
Dept: FAMILY MEDICINE CLINIC | Age: 74
End: 2024-03-13

## 2024-03-13 DIAGNOSIS — E78.00 PURE HYPERCHOLESTEROLEMIA: ICD-10-CM

## 2024-03-13 NOTE — TELEPHONE ENCOUNTER
From: Carlyn Rojas  Sent: 3/13/2024 11:16 AM EDT  To: Nabil Batista Clinical Staff  Subject: Results    Is she gonna keep me at 5mg on Rosuvastatin? Will she be sending in my prescription to [facility]?  Thank you for your help!

## 2024-07-10 ENCOUNTER — OFFICE VISIT (OUTPATIENT)
Dept: FAMILY MEDICINE CLINIC | Age: 74
End: 2024-07-10
Payer: MEDICARE

## 2024-07-10 VITALS
TEMPERATURE: 97 F | HEIGHT: 65 IN | SYSTOLIC BLOOD PRESSURE: 124 MMHG | BODY MASS INDEX: 20.33 KG/M2 | HEART RATE: 96 BPM | WEIGHT: 122 LBS | OXYGEN SATURATION: 98 % | DIASTOLIC BLOOD PRESSURE: 74 MMHG

## 2024-07-10 DIAGNOSIS — N89.8 VAGINAL IRRITATION: Primary | ICD-10-CM

## 2024-07-10 LAB
BILIRUBIN, POC: NORMAL
BLOOD URINE, POC: NORMAL
CLARITY, POC: CLEAR
COLOR, POC: YELLOW
GLUCOSE URINE, POC: NORMAL
KETONES, POC: NORMAL
LEUKOCYTE EST, POC: NORMAL
NITRITE, POC: NORMAL
PH, POC: 6.5
PROTEIN, POC: NORMAL
SPECIFIC GRAVITY, POC: NORMAL
UROBILINOGEN, POC: 0.2

## 2024-07-10 PROCEDURE — 81003 URINALYSIS AUTO W/O SCOPE: CPT | Performed by: NURSE PRACTITIONER

## 2024-07-10 SDOH — ECONOMIC STABILITY: FOOD INSECURITY: WITHIN THE PAST 12 MONTHS, THE FOOD YOU BOUGHT JUST DIDN'T LAST AND YOU DIDN'T HAVE MONEY TO GET MORE.: NEVER TRUE

## 2024-07-10 SDOH — ECONOMIC STABILITY: FOOD INSECURITY: WITHIN THE PAST 12 MONTHS, YOU WORRIED THAT YOUR FOOD WOULD RUN OUT BEFORE YOU GOT MONEY TO BUY MORE.: NEVER TRUE

## 2024-07-10 SDOH — ECONOMIC STABILITY: INCOME INSECURITY: HOW HARD IS IT FOR YOU TO PAY FOR THE VERY BASICS LIKE FOOD, HOUSING, MEDICAL CARE, AND HEATING?: NOT HARD AT ALL

## 2024-07-10 ASSESSMENT — ENCOUNTER SYMPTOMS
WHEEZING: 0
SHORTNESS OF BREATH: 0
NAUSEA: 0
VOMITING: 0
BACK PAIN: 0
DIARRHEA: 0
ABDOMINAL PAIN: 0
COUGH: 0

## 2024-07-10 ASSESSMENT — PATIENT HEALTH QUESTIONNAIRE - PHQ9
SUM OF ALL RESPONSES TO PHQ9 QUESTIONS 1 & 2: 0
SUM OF ALL RESPONSES TO PHQ QUESTIONS 1-9: 0
1. LITTLE INTEREST OR PLEASURE IN DOING THINGS: NOT AT ALL
2. FEELING DOWN, DEPRESSED OR HOPELESS: NOT AT ALL
SUM OF ALL RESPONSES TO PHQ QUESTIONS 1-9: 0

## 2024-07-10 NOTE — PROGRESS NOTES
Carlyn Rojas (:  1950) is a 74 y.o. female, Established patient, here for evaluation of the following chief complaint(s):  Urinary Tract Infection (X 3-4 days )      Vitals:    07/10/24 1212   BP: 124/74   Pulse: 96   Temp: 97 °F (36.1 °C)   SpO2: 98%       ASSESSMENT/PLAN:  1. Vaginal irritation  -     POCT Urinalysis No Micro (Auto)        No follow-ups on file.      SUBJECTIVE/OBJECTIVE:    Urinary Tract Infection  This is a new problem. Episode onset: x3 days burning with urination. The problem has been gradually worsening since onset. Associated symptoms include pain. The pain is present in the perineum. Her pain is at a severity of 5/10.         Review of Systems   Constitutional:  Negative for chills, fatigue and fever.   Respiratory:  Negative for cough, shortness of breath and wheezing.    Cardiovascular:  Negative for chest pain and palpitations.   Gastrointestinal:  Negative for abdominal pain, diarrhea, nausea and vomiting.   Genitourinary:  Positive for dysuria and vaginal pain (burning on outside with urination). Negative for frequency, urgency and vaginal discharge.   Musculoskeletal:  Negative for arthralgias, back pain and myalgias.         Physical Exam  Vitals reviewed.   Constitutional:       General: She is not in acute distress.     Appearance: Normal appearance.   Cardiovascular:      Rate and Rhythm: Normal rate and regular rhythm.   Pulmonary:      Effort: Pulmonary effort is normal. No respiratory distress.      Breath sounds: Normal air entry.   Abdominal:      General: Bowel sounds are normal.      Palpations: Abdomen is soft.      Tenderness: There is no abdominal tenderness. There is no right CVA tenderness or left CVA tenderness.   Skin:     General: Skin is warm and dry.   Neurological:      Mental Status: She is alert and oriented to person, place, and time.   Psychiatric:         Behavior: Behavior is cooperative.               An electronic signature was used to

## 2024-09-22 DIAGNOSIS — E03.9 ACQUIRED HYPOTHYROIDISM: ICD-10-CM

## 2024-09-23 RX ORDER — LEVOTHYROXINE SODIUM 88 UG/1
88 TABLET ORAL DAILY
Qty: 90 TABLET | Refills: 1 | Status: SHIPPED | OUTPATIENT
Start: 2024-09-23

## 2024-11-04 ENCOUNTER — OFFICE VISIT (OUTPATIENT)
Dept: FAMILY MEDICINE CLINIC | Age: 74
End: 2024-11-04

## 2024-11-04 VITALS
SYSTOLIC BLOOD PRESSURE: 134 MMHG | BODY MASS INDEX: 19.99 KG/M2 | HEIGHT: 65 IN | DIASTOLIC BLOOD PRESSURE: 88 MMHG | HEART RATE: 92 BPM | OXYGEN SATURATION: 97 % | WEIGHT: 120 LBS

## 2024-11-04 DIAGNOSIS — F41.9 ANXIETY: Primary | ICD-10-CM

## 2024-11-04 RX ORDER — BUSPIRONE HYDROCHLORIDE 5 MG/1
5 TABLET ORAL 2 TIMES DAILY
Qty: 60 TABLET | Refills: 0 | Status: SHIPPED | OUTPATIENT
Start: 2024-11-04 | End: 2024-12-04

## 2024-11-04 ASSESSMENT — ENCOUNTER SYMPTOMS
SORE THROAT: 0
NAUSEA: 0
EYE REDNESS: 0
VOMITING: 0
SHORTNESS OF BREATH: 0
TROUBLE SWALLOWING: 0
WHEEZING: 0
DIARRHEA: 0
COUGH: 0
RHINORRHEA: 0

## 2024-11-04 NOTE — PROGRESS NOTES
General: No focal deficit present.      Mental Status: She is alert and oriented to person, place, and time.   Psychiatric:         Mood and Affect: Mood normal.         Behavior: Behavior normal. Behavior is cooperative.         Assessment/Plan:  74 y.o. female here mainly for Anxiety:  - Try low dose Buspar for anxiety     Diagnosis Orders   1. Anxiety  busPIRone (BUSPAR) 5 MG tablet           [facility] .    Cele East, HAYDEN - CNP

## 2024-11-09 DIAGNOSIS — E78.00 PURE HYPERCHOLESTEROLEMIA: ICD-10-CM

## 2024-11-11 RX ORDER — ROSUVASTATIN CALCIUM 5 MG/1
5 TABLET, COATED ORAL NIGHTLY
Qty: 100 TABLET | Refills: 2 | Status: SHIPPED | OUTPATIENT
Start: 2024-11-11

## 2024-11-11 NOTE — TELEPHONE ENCOUNTER
Comments:     Last Office Visit (last PCP visit):   11/04/2024    Next Visit Date:  Future Appointments   Date Time Provider Department Center   1/3/2025  8:00 AM Cele East APRN - CNP [facility]       **If hasn't been seen in over a year OR hasn't followed up according to last diabetes/ADHD visit, make appointment for patient before sending refill to provider.    Rx requested:  Requested Prescriptions     Pending Prescriptions Disp Refills    rosuvastatin (CRESTOR) 5 MG tablet [Pharmacy Med Name: Rosuvastatin Calcium 5 MG Oral Tablet] 100 tablet 2     Sig: TAKE 1 TABLET BY MOUTH EVERY  NIGHT

## 2024-11-13 ENCOUNTER — TELEMEDICINE (OUTPATIENT)
Dept: FAMILY MEDICINE CLINIC | Age: 74
End: 2024-11-13

## 2024-11-13 ENCOUNTER — PATIENT MESSAGE (OUTPATIENT)
Dept: FAMILY MEDICINE CLINIC | Age: 74
End: 2024-11-13

## 2024-11-13 DIAGNOSIS — F41.9 ANXIETY: ICD-10-CM

## 2024-11-13 DIAGNOSIS — F41.9 ANXIETY: Primary | ICD-10-CM

## 2024-11-13 RX ORDER — BUSPIRONE HYDROCHLORIDE 5 MG/1
5 TABLET ORAL 2 TIMES DAILY
Qty: 180 TABLET | Refills: 0 | Status: SHIPPED | OUTPATIENT
Start: 2024-11-13 | End: 2024-11-15 | Stop reason: SDUPTHER

## 2024-11-13 NOTE — PROGRESS NOTES
2024    TELEHEALTH EVALUATION -- Audio/Visual    HPI:    Carlyn Rojas (:  1950) has requested an audio/video evaluation for the following concern(s):    Last visit - Started Buspar 5mg BID for anxiety    She talks to her therapist who is her    She is very anxious and upset about the false Lichen Sclerosis dx and having a hard time getting over it.   She just has questions about the medication and not sure if she should increase the dose or not.      We discussed the medication at length     Review of Systems   Constitutional:  Negative for appetite change, chills, fatigue and fever.   HENT:  Negative for congestion, ear pain, rhinorrhea, sore throat and trouble swallowing.    Eyes:  Negative for redness.   Respiratory:  Negative for cough, shortness of breath and wheezing.    Gastrointestinal:  Negative for diarrhea, nausea and vomiting.   Musculoskeletal:  Negative for myalgias.   Skin:  Negative for rash.   Neurological:  Negative for headaches.   Psychiatric/Behavioral:  Negative for agitation and confusion. The patient is nervous/anxious. The patient is not hyperactive.        Prior to Visit Medications    Medication Sig Taking? Authorizing Provider   rosuvastatin (CRESTOR) 5 MG tablet TAKE 1 TABLET BY MOUTH EVERY  NIGHT Yes Cele East APRN - CNP   levothyroxine (SYNTHROID) 88 MCG tablet TAKE 1 TABLET BY MOUTH DAILY Yes German Tillman MD   fluticasone (FLONASE) 50 MCG/ACT nasal spray USE 1 SPRAY NASALLY DAILY Yes Cele East APRN - CNP   triamcinolone (KENALOG) 0.1 % cream Apply topically 2 times daily. Yes Cele East APRN - CNP   Lutein 20 MG CAPS Take 20 mg by mouth daily Yes Sherley Bauer MD   Omega 3 1000 MG CAPS Take 1 Can by mouth daily. Yes Sherley Bauer MD   therapeutic multivitamin-minerals (THERAGRAN-M) tablet Take 1 tablet by mouth daily Yes Sherley Bauer MD   Misc Natural Products (LUTEIN 20 PO) Take  by

## 2024-11-15 RX ORDER — BUSPIRONE HYDROCHLORIDE 5 MG/1
5 TABLET ORAL 2 TIMES DAILY
Qty: 60 TABLET | Refills: 0 | Status: SHIPPED | OUTPATIENT
Start: 2024-11-15 | End: 2024-12-15

## 2024-11-15 ASSESSMENT — ENCOUNTER SYMPTOMS
COUGH: 0
VOMITING: 0
RHINORRHEA: 0
SORE THROAT: 0
NAUSEA: 0
SHORTNESS OF BREATH: 0
WHEEZING: 0
TROUBLE SWALLOWING: 0
DIARRHEA: 0
EYE REDNESS: 0

## 2024-11-16 ENCOUNTER — OFFICE VISIT (OUTPATIENT)
Dept: FAMILY MEDICINE CLINIC | Age: 74
End: 2024-11-16

## 2024-11-16 VITALS
HEART RATE: 109 BPM | OXYGEN SATURATION: 99 % | SYSTOLIC BLOOD PRESSURE: 132 MMHG | BODY MASS INDEX: 19.97 KG/M2 | TEMPERATURE: 98.9 F | DIASTOLIC BLOOD PRESSURE: 80 MMHG | WEIGHT: 120 LBS

## 2024-11-16 DIAGNOSIS — J01.00 ACUTE NON-RECURRENT MAXILLARY SINUSITIS: Primary | ICD-10-CM

## 2024-11-16 DIAGNOSIS — T36.95XA ANTIBIOTIC-INDUCED YEAST INFECTION: ICD-10-CM

## 2024-11-16 DIAGNOSIS — B37.9 ANTIBIOTIC-INDUCED YEAST INFECTION: ICD-10-CM

## 2024-11-16 RX ORDER — GUAIFENESIN 600 MG/1
600 TABLET, EXTENDED RELEASE ORAL 2 TIMES DAILY
Qty: 30 TABLET | Refills: 0 | Status: SHIPPED | OUTPATIENT
Start: 2024-11-16 | End: 2024-12-01

## 2024-11-16 RX ORDER — FLUCONAZOLE 150 MG/1
150 TABLET ORAL DAILY
Qty: 2 TABLET | Refills: 0 | Status: SHIPPED | OUTPATIENT
Start: 2024-11-16

## 2024-11-16 RX ORDER — CETIRIZINE HYDROCHLORIDE 10 MG/1
10 TABLET ORAL DAILY
Qty: 30 TABLET | Refills: 0 | Status: SHIPPED | OUTPATIENT
Start: 2024-11-16

## 2024-11-16 ASSESSMENT — ENCOUNTER SYMPTOMS
SINUS PRESSURE: 1
WHEEZING: 0
SHORTNESS OF BREATH: 0
SINUS PAIN: 1
SORE THROAT: 1
EYE PAIN: 0
COUGH: 1
EYE REDNESS: 0
EYE DISCHARGE: 1
EYE ITCHING: 0
RHINORRHEA: 0

## 2024-11-16 NOTE — PROGRESS NOTES
amoxicillin-clavulanate (AUGMENTIN) 875-125 MG per tablet     Sig: Take 1 tablet by mouth 2 times daily for 7 days     Dispense:  14 tablet     Refill:  0    guaiFENesin (MUCINEX) 600 MG extended release tablet     Sig: Take 1 tablet by mouth 2 times daily for 15 days     Dispense:  30 tablet     Refill:  0    fluconazole (DIFLUCAN) 150 MG tablet     Sig: Take 1 tablet by mouth daily Take 1 tablet by mouth and then 72 hours later take 1 tablet by mouth.     Dispense:  2 tablet     Refill:  0     There are no discontinued medications.  Return in about 1 week (around 11/23/2024), or if symptoms worsen or fail to improve, for follow up with PCP.    Reviewed with the patient: current clinical status,medications, activities and diet.     Side effects, adverse effects of the medication prescribed today, as well as treatment plan/ rationale and result expectations have been discussed with the patient who expresses understanding and desires to proceed.    Close follow up to evaluate treatment results and for coordination of care.  I have reviewed the patient's medical history in detail and updated the computerized patient record.    Jessenia Jones, APRN - CNP

## 2024-11-16 NOTE — PATIENT INSTRUCTIONS
Antibiotic Instructions: Complete the full course of antibiotics as ordered.  Take each dose with a small snack or meal to lessen potential GI upset.  To prevent antibiotic resistance, please take medication as ordered and for the full duration even if you start to feel better.  Consider intake of yogurt or probiotic during antibiotic use and for a few days after to help reduce the risk of developing a secondary infection. Take the yogurt or probiotic at least 2 hours after taking the antibiotic.     Stop allegra and start zyrtec

## 2024-11-20 ENCOUNTER — PATIENT MESSAGE (OUTPATIENT)
Dept: FAMILY MEDICINE CLINIC | Age: 74
End: 2024-11-20

## 2024-11-20 DIAGNOSIS — F41.9 ANXIETY: ICD-10-CM

## 2024-11-20 RX ORDER — BUSPIRONE HYDROCHLORIDE 5 MG/1
5 TABLET ORAL 2 TIMES DAILY
Qty: 60 TABLET | Refills: 0 | Status: SHIPPED | OUTPATIENT
Start: 2024-11-20 | End: 2024-11-21 | Stop reason: SDUPTHER

## 2024-11-20 NOTE — TELEPHONE ENCOUNTER
Comments:     Last Office Visit (last PCP visit):   11/13/2024    Next Visit Date:  Future Appointments   Date Time Provider Department Center   1/3/2025  8:00 AM Cele East APRN - CNP [facility] DE[facility]       **If hasn't been seen in over a year OR hasn't followed up according to last diabetes/ADHD visit, make appointment for patient before sending refill to provider.    Rx requested:  Requested Prescriptions     Pending Prescriptions Disp Refills    busPIRone (BUSPAR) 5 MG tablet 60 tablet 0     Sig: Take 1 tablet by mouth 2 times daily

## 2024-11-21 DIAGNOSIS — F41.9 ANXIETY: ICD-10-CM

## 2024-11-21 RX ORDER — BUSPIRONE HYDROCHLORIDE 5 MG/1
5 TABLET ORAL 2 TIMES DAILY
Qty: 60 TABLET | Refills: 0 | Status: SHIPPED | OUTPATIENT
Start: 2024-11-21 | End: 2024-11-21 | Stop reason: SDUPTHER

## 2024-11-21 RX ORDER — BUSPIRONE HYDROCHLORIDE 5 MG/1
5 TABLET ORAL 3 TIMES DAILY
Qty: 270 TABLET | Refills: 2 | Status: SHIPPED | OUTPATIENT
Start: 2024-11-21 | End: 2025-02-19

## 2024-11-21 RX ORDER — BUSPIRONE HYDROCHLORIDE 5 MG/1
5 TABLET ORAL 3 TIMES DAILY
Qty: 90 TABLET | Refills: 0 | Status: SHIPPED | OUTPATIENT
Start: 2024-11-21 | End: 2024-12-21

## 2024-11-21 NOTE — TELEPHONE ENCOUNTER
Comments:     Last Office Visit (last PCP visit):   11/13/2024    Next Visit Date:  Future Appointments   Date Time Provider Department Center   1/3/2025  8:00 AM Cele East APRN - CNP [facility] DE[facility]       **If hasn't been seen in over a year OR hasn't followed up according to last diabetes/ADHD visit, make appointment for patient before sending refill to provider.    Rx requested:  Requested Prescriptions     Pending Prescriptions Disp Refills    busPIRone (BUSPAR) 5 MG tablet 60 tablet 0     Sig: Take 1 tablet by mouth 2 times daily

## 2024-11-21 NOTE — TELEPHONE ENCOUNTER
Comments: PATIENT NEEDS IT TO BE SENT TID TO BOTH [facility] AS WELL AS [facility]. PENDING FOR BOTH PHARMACIES AS IT ACCIDENTALLY WAS CANCELLED.       PATIENT WOULD LIKE A CALL ONCE THIS IS DONE SO THAT SHE CAN BE AWARE THAT THIS IS COMPLETE.     Last Office Visit (last PCP visit):   11/13/2024    Next Visit Date:  Future Appointments   Date Time Provider Department Center   1/3/2025  8:00 AM Cele East APRN - CNP [place] [facility] DEP       **If hasn't been seen in over a year OR hasn't followed up according to last diabetes/ADHD visit, make appointment for patient before sending refill to provider.    Rx requested:  Requested Prescriptions     Pending Prescriptions Disp Refills    busPIRone (BUSPAR) 5 MG tablet 90 tablet 3     Sig: Take 1 tablet by mouth 3 times daily    busPIRone (BUSPAR) 5 MG tablet 60 tablet 0     Sig: Take 1 tablet by mouth 2 times daily

## 2024-12-09 ENCOUNTER — TELEPHONE (OUTPATIENT)
Dept: FAMILY MEDICINE CLINIC | Age: 74
End: 2024-12-09

## 2024-12-09 NOTE — TELEPHONE ENCOUNTER
Patient came into office asking if you recommended a therapist for anxiety?  Patient prefers a call back on her cell phone when a response is given please.

## 2024-12-11 DIAGNOSIS — E03.9 ACQUIRED HYPOTHYROIDISM: ICD-10-CM

## 2024-12-11 RX ORDER — LEVOTHYROXINE SODIUM 88 UG/1
88 TABLET ORAL DAILY
Qty: 100 TABLET | Refills: 2 | Status: SHIPPED | OUTPATIENT
Start: 2024-12-11

## 2024-12-11 NOTE — TELEPHONE ENCOUNTER
Comments:     Last Office Visit (last PCP visit):   11/13/2024    Next Visit Date:  Future Appointments   Date Time Provider Department Center   1/3/2025  8:00 AM Cele East APRN - CNP [facility] DE[facility]       **If hasn't been seen in over a year OR hasn't followed up according to last diabetes/ADHD visit, make appointment for patient before sending refill to provider.    Rx requested:  Requested Prescriptions     Pending Prescriptions Disp Refills    levothyroxine (SYNTHROID) 88 MCG tablet [Pharmacy Med Name: Levothyroxine Sodium 88 MCG Oral Tablet] 100 tablet 2     Sig: TAKE 1 TABLET BY MOUTH DAILY

## 2025-01-02 ENCOUNTER — HOSPITAL ENCOUNTER (OUTPATIENT)
Age: 75
Setting detail: SPECIMEN
Discharge: HOME | End: 2025-01-02
Payer: MEDICARE

## 2025-01-02 ENCOUNTER — OFFICE VISIT (OUTPATIENT)
Dept: FAMILY MEDICINE CLINIC | Age: 75
End: 2025-01-02

## 2025-01-02 VITALS
BODY MASS INDEX: 19.66 KG/M2 | SYSTOLIC BLOOD PRESSURE: 134 MMHG | HEART RATE: 116 BPM | WEIGHT: 118 LBS | DIASTOLIC BLOOD PRESSURE: 82 MMHG | HEIGHT: 65 IN | TEMPERATURE: 97.1 F | OXYGEN SATURATION: 98 %

## 2025-01-02 DIAGNOSIS — Z00.00 MEDICARE ANNUAL WELLNESS VISIT, SUBSEQUENT: Primary | ICD-10-CM

## 2025-01-02 DIAGNOSIS — M81.0 SENILE OSTEOPOROSIS: ICD-10-CM

## 2025-01-02 DIAGNOSIS — E03.9 ACQUIRED HYPOTHYROIDISM: ICD-10-CM

## 2025-01-02 DIAGNOSIS — Z00.00 MEDICARE ANNUAL WELLNESS VISIT, SUBSEQUENT: ICD-10-CM

## 2025-01-02 LAB
ALBUMIN SERPL-MCNC: 4.4 G/DL (ref 3.5–4.6)
ALP SERPL-CCNC: 66 U/L (ref 40–130)
ALT SERPL-CCNC: 8 U/L (ref 0–33)
ANION GAP SERPL CALCULATED.3IONS-SCNC: 15 MEQ/L (ref 9–15)
AST SERPL-CCNC: 20 U/L (ref 0–35)
BILIRUB SERPL-MCNC: 0.4 MG/DL (ref 0.2–0.7)
BUN SERPL-MCNC: 6 MG/DL (ref 8–23)
CALCIUM SERPL-MCNC: 9.4 MG/DL (ref 8.5–9.9)
CHLORIDE SERPL-SCNC: 101 MEQ/L (ref 95–107)
CHOLEST SERPL-MCNC: 178 MG/DL (ref 0–199)
CO2 SERPL-SCNC: 25 MEQ/L (ref 20–31)
CREAT SERPL-MCNC: 0.63 MG/DL (ref 0.5–0.9)
ERYTHROCYTE [DISTWIDTH] IN BLOOD BY AUTOMATED COUNT: 13.8 % (ref 11.5–14.5)
GLOBULIN SER CALC-MCNC: 3.2 G/DL (ref 2.3–3.5)
GLUCOSE FASTING: 111 MG/DL (ref 70–99)
HCT VFR BLD AUTO: 43.8 % (ref 37–47)
HDLC SERPL-MCNC: 63 MG/DL (ref 40–59)
HGB BLD-MCNC: 13.8 G/DL (ref 12–16)
LDL CHOLESTEROL: 99 MG/DL (ref 0–129)
MCH RBC QN AUTO: 29.4 PG (ref 27–31.3)
MCHC RBC AUTO-ENTMCNC: 31.5 % (ref 33–37)
MCV RBC AUTO: 93.4 FL (ref 79.4–94.8)
PLATELET # BLD AUTO: 250 K/UL (ref 130–400)
POTASSIUM SERPL-SCNC: 4.4 MEQ/L (ref 3.4–4.9)
PROT SERPL-MCNC: 7.6 G/DL (ref 6.3–8)
RBC # BLD AUTO: 4.69 M/UL (ref 4.2–5.4)
SODIUM SERPL-SCNC: 141 MEQ/L (ref 135–144)
TRIGLYCERIDE, FASTING: 79 MG/DL (ref 0–150)
TSH SERPL-MCNC: 0.23 UIU/ML (ref 0.44–3.86)
WBC # BLD AUTO: 6.2 K/UL (ref 4.8–10.8)

## 2025-01-02 PROCEDURE — 80061 LIPID PANEL: CPT

## 2025-01-02 PROCEDURE — 84443 ASSAY THYROID STIM HORMONE: CPT

## 2025-01-02 PROCEDURE — 80053 COMPREHEN METABOLIC PANEL: CPT

## 2025-01-02 PROCEDURE — 82306 VITAMIN D 25 HYDROXY: CPT

## 2025-01-02 PROCEDURE — 85027 COMPLETE CBC AUTOMATED: CPT

## 2025-01-02 ASSESSMENT — PATIENT HEALTH QUESTIONNAIRE - PHQ9
1. LITTLE INTEREST OR PLEASURE IN DOING THINGS: NOT AT ALL
SUM OF ALL RESPONSES TO PHQ QUESTIONS 1-9: 1
1. LITTLE INTEREST OR PLEASURE IN DOING THINGS: NOT AT ALL
SUM OF ALL RESPONSES TO PHQ9 QUESTIONS 1 & 2: 1
2. FEELING DOWN, DEPRESSED OR HOPELESS: NOT AT ALL
2. FEELING DOWN, DEPRESSED OR HOPELESS: NOT AT ALL
SUM OF ALL RESPONSES TO PHQ QUESTIONS 1-9: 1
1. LITTLE INTEREST OR PLEASURE IN DOING THINGS: SEVERAL DAYS
SUM OF ALL RESPONSES TO PHQ QUESTIONS 1-9: 0
SUM OF ALL RESPONSES TO PHQ9 QUESTIONS 1 & 2: 0
SUM OF ALL RESPONSES TO PHQ QUESTIONS 1-9: 1
SUM OF ALL RESPONSES TO PHQ QUESTIONS 1-9: 0
SUM OF ALL RESPONSES TO PHQ QUESTIONS 1-9: 0
SUM OF ALL RESPONSES TO PHQ QUESTIONS 1-9: 1
2. FEELING DOWN, DEPRESSED OR HOPELESS: NOT AT ALL
SUM OF ALL RESPONSES TO PHQ9 QUESTIONS 1 & 2: 0
SUM OF ALL RESPONSES TO PHQ QUESTIONS 1-9: 0

## 2025-01-02 ASSESSMENT — LIFESTYLE VARIABLES
HOW MANY STANDARD DRINKS CONTAINING ALCOHOL DO YOU HAVE ON A TYPICAL DAY: 1 OR 2
HOW OFTEN DO YOU HAVE A DRINK CONTAINING ALCOHOL: 2-3 TIMES A WEEK

## 2025-01-02 NOTE — PROGRESS NOTES
Medicare Annual Wellness Visit          Patient wants to get labs done and then discuss the results with her PCP tomorrow. She prefers not to wait until tomorrow to see her PCP to get labs. Has much anxiety and wonders if her thyroid is the cause            Carlyn Rojas is here for Medicare AWV (Pt would like labs ordered and would like to discuss results with Cele tomorrow, would like to be checked for many different labs)    Assessment & Plan   Medicare annual wellness visit, subsequent  -     Lipid, Fasting; Future  -     Comprehensive Metabolic Panel, Fasting; Future  -     CBC; Future  Acquired hypothyroidism  -     TSH; Future  Senile osteoporosis  -     Vitamin D 25 Hydroxy; Future     No follow-ups on file.     Subjective       Patient's complete Health Risk Assessment and screening values have been reviewed and are found in Flowsheets. The following problems were reviewed today and where indicated follow up appointments were made and/or referrals ordered.    Positive Risk Factor Screenings with Interventions:             General HRA Questions:  Select all that apply: (!) Stress  Interventions - Stress:  See AVS for additional education material  See A/P for plan and any pertinent orders            Safety:  Do you have non-slip mats or non-slip surfaces or shower bars or grab bars in your shower or bathtub?: (!) No  Interventions:  See AVS for additional education material  See A/P for plan and any pertinent orders                   Objective   Vitals:    01/02/25 0926   BP: 134/82   Pulse: (!) 116   Temp: 97.1 °F (36.2 °C)   TempSrc: Infrared   SpO2: 98%   Weight: 53.5 kg (118 lb)   Height: 1.651 m (5' 5\")      Body mass index is 19.64 kg/m².                  No Known Allergies  Prior to Visit Medications    Medication Sig Taking? Authorizing Provider   levothyroxine (SYNTHROID) 88 MCG tablet TAKE 1 TABLET BY MOUTH DAILY Yes Cele East, APRN - CNP   busPIRone (BUSPAR) 5 MG tablet Take 1

## 2025-01-02 NOTE — PATIENT INSTRUCTIONS
think you may have a problem with alcohol or drug use, talk to your doctor.   Medicines    Take your medicines exactly as prescribed. Call your doctor if you think you are having a problem with your medicine.     If your doctor recommends aspirin, take the amount directed each day. Make sure you take aspirin and not another kind of pain reliever, such as acetaminophen (Tylenol).   When should you call for help?   Call 911 if you have symptoms of a heart attack. These may include:    Chest pain or pressure, or a strange feeling in the chest.     Sweating.     Shortness of breath.     Pain, pressure, or a strange feeling in the back, neck, jaw, or upper belly or in one or both shoulders or arms.     Lightheadedness or sudden weakness.     A fast or irregular heartbeat.   After you call 911, the  may tell you to chew 1 adult-strength or 2 to 4 low-dose aspirin. Wait for an ambulance. Do not try to drive yourself.  Watch closely for changes in your health, and be sure to contact your doctor if you have any problems.  Where can you learn more?  Go to https://www.[employer].net/patientEd and enter F075 to learn more about \"A Healthy Heart: Care Instructions.\"  Current as of: June 24, 2023  Content Version: 14.2  © 2024 [employer]   Care instructions adapted under license by [employer]. If you have questions about a medical condition or this instruction, always ask your healthcare professional. [employer] disclaims any warranty or liability for your use of this information.      Personalized Preventive Plan for Carlyn Rojas - 1/2/2025  Medicare offers a range of preventive health benefits. Some of the tests and screenings are paid in full while other may be subject to a deductible, co-insurance, and/or copay.  Some of these benefits include a comprehensive review of your medical history including lifestyle, illnesses that may run in your family, and various assessments and

## 2025-01-03 ENCOUNTER — OFFICE VISIT (OUTPATIENT)
Dept: FAMILY MEDICINE CLINIC | Age: 75
End: 2025-01-03

## 2025-01-03 VITALS
HEIGHT: 65 IN | BODY MASS INDEX: 19.66 KG/M2 | SYSTOLIC BLOOD PRESSURE: 112 MMHG | WEIGHT: 118 LBS | OXYGEN SATURATION: 97 % | DIASTOLIC BLOOD PRESSURE: 70 MMHG | HEART RATE: 94 BPM

## 2025-01-03 DIAGNOSIS — F41.9 ANXIETY: Primary | ICD-10-CM

## 2025-01-03 DIAGNOSIS — E03.9 ACQUIRED HYPOTHYROIDISM: ICD-10-CM

## 2025-01-03 DIAGNOSIS — H65.91 FLUID LEVEL BEHIND TYMPANIC MEMBRANE OF RIGHT EAR: ICD-10-CM

## 2025-01-03 LAB — VITAMIN D 25-HYDROXY: 49.6 NG/ML (ref 30–100)

## 2025-01-03 RX ORDER — LEVOTHYROXINE SODIUM 88 UG/1
88 TABLET ORAL DAILY
Qty: 90 TABLET | Refills: 3 | Status: SHIPPED | OUTPATIENT
Start: 2025-01-03 | End: 2025-04-03

## 2025-01-03 RX ORDER — BUSPIRONE HYDROCHLORIDE 15 MG/1
15 TABLET ORAL 2 TIMES DAILY
Qty: 180 TABLET | Refills: 0 | Status: SHIPPED | OUTPATIENT
Start: 2025-01-03 | End: 2025-04-03

## 2025-01-03 NOTE — PATIENT INSTRUCTIONS
Relaxation Techniques  adapted from[employer] © 2013     When a person is confronted with anxiety, their body undergoes several changes and  enters a special state called the fight-or-flight response. The body prepares to either  fight or flee the perceived danger.    During the fight-or-flight response it’s common to experience a “blank” mind, increased  heart rate, sweating, tense muscles, and more. Unfortunately, these bodily responses do  little good when it comes to protecting us from modern sources of anxiety.    Using a variety of skills, you can end the fight-or-flight response before the symptoms  become too extreme. These skills will require practice to work effectively, so don’t wait  until the last minute to try them out!    Deep Breathing  It’s natural to take long, deep breaths, when relaxed. However, during the fight-or-flight  response, breathing becomes rapid and shallow. Deep breathing reverses that, and sends messages to the brain to begin calming the body. Practice will make your body respond more efficiently to deep breathing in the future.     Breathe in slowly. Count in your head and make sure the inward breath lasts at  least 5 seconds. Pay attention to the feeling of the air filling your lungs.     Hold your breath for 5 to 10 seconds (again, keep count). You don’t want to feel   uncomfortable, but it should last quite a bit longer than an ordinary breath.    Breathe out very slowly for 5 to 10 seconds (count! Should be twice as long as your inhale). Pretend like you’re breathing through a straw to slow yourself down. Try using a real straw to practice.     Repeat the breathing process until you feel calm.    Imagery  Think about some of your favorite and least favorite places. If you think about the place  hard enough--if you really try to think about what it’s like--you may begin to have feelings you associate with that location. Our brain has the ability to create emotional

## 2025-01-03 NOTE — PROGRESS NOTES
Conjunctiva/sclera: Conjunctivae normal.   Cardiovascular:      Rate and Rhythm: Normal rate and regular rhythm.      Heart sounds: Normal heart sounds, S1 normal and S2 normal.   Pulmonary:      Effort: Pulmonary effort is normal.      Breath sounds: Normal breath sounds. No wheezing or rhonchi.   Musculoskeletal:      Cervical back: Normal range of motion.   Skin:     General: Skin is warm and dry.   Neurological:      General: No focal deficit present.      Mental Status: She is alert and oriented to person, place, and time.   Psychiatric:         Mood and Affect: Mood normal.         Behavior: Behavior normal. Behavior is cooperative.         Assessment:       Diagnosis Orders   1. Anxiety  busPIRone (BUSPAR) 15 MG tablet      2.  Acquired Hypothyroidism    No results found for this visit on 01/03/25.   Plan:   Anxiety- Going to increase the buspar to 15 mg morning and afternoon and then 5 mg at night if needed. A total of 35 mg/day. She has been given handouts for therapy but has not done that yet, do feel this may benefit her.     TSH lab was on the low side compared to her norm but not critically low. She is on Biotin but says she did not take it the morning of her labs. Will go ahead and have her skip her thyroid medication once a week. Will do this to see if makes a difference with her anxiety but doubtful this is the culprit. Then recheck the labs in a couple months. Do not take Biotin a couple days before the lab draw.     Hyperlipidemia- Stable- Continue Rosuvastatin       Assessment & Plan   Carlyn \"Carlyn ARTHUR\" was seen today for results.    Diagnoses and all orders for this visit:    Anxiety  -     busPIRone (BUSPAR) 15 MG tablet; Take 15 mg by mouth 2 times daily      No orders of the defined types were placed in this encounter.    Orders Placed This Encounter   Medications    busPIRone (BUSPAR) 15 MG tablet     Sig: Take 15 mg by mouth 2 times daily     Dispense:  180 tablet     Refill:  0

## 2025-01-04 ASSESSMENT — ENCOUNTER SYMPTOMS
NAUSEA: 0
SHORTNESS OF BREATH: 0
VOMITING: 0
DIARRHEA: 0
WHEEZING: 0
SORE THROAT: 0
EYE REDNESS: 0
COUGH: 0
RHINORRHEA: 0
TROUBLE SWALLOWING: 0

## 2025-01-06 RX ORDER — FLUTICASONE PROPIONATE 50 MCG
SPRAY, SUSPENSION (ML) NASAL
Qty: 32 G | Refills: 5 | Status: SHIPPED | OUTPATIENT
Start: 2025-01-06

## 2025-01-06 NOTE — TELEPHONE ENCOUNTER
Comments:     Last Office Visit (last PCP visit):   1/3/2025    Next Visit Date:  No future appointments.    **If hasn't been seen in over a year OR hasn't followed up according to last diabetes/ADHD visit, make appointment for patient before sending refill to provider.    Rx requested:  Requested Prescriptions     Pending Prescriptions Disp Refills    fluticasone (FLONASE) 50 MCG/ACT nasal spray [Pharmacy Med Name: Fluticasone Propionate 50 MCG/ACT Nasal Suspension] 32 g 5     Sig: USE 1 SPRAY NASALLY DAILY

## 2025-01-16 DIAGNOSIS — R21 RASH: ICD-10-CM

## 2025-01-16 NOTE — TELEPHONE ENCOUNTER
Comments:     Last Office Visit (last PCP visit):   1/3/2025    Next Visit Date:  No future appointments.    **If hasn't been seen in over a year OR hasn't followed up according to last diabetes/ADHD visit, make appointment for patient before sending refill to provider.    Rx requested:  Requested Prescriptions     Pending Prescriptions Disp Refills    triamcinolone (KENALOG) 0.1 % cream 80 g 3     Sig: Apply topically 2 times daily.

## 2025-01-17 RX ORDER — TRIAMCINOLONE ACETONIDE 1 MG/G
CREAM TOPICAL
Qty: 80 G | Refills: 3 | Status: SHIPPED | OUTPATIENT
Start: 2025-01-17

## 2025-02-11 SDOH — ECONOMIC STABILITY: FOOD INSECURITY: WITHIN THE PAST 12 MONTHS, THE FOOD YOU BOUGHT JUST DIDN'T LAST AND YOU DIDN'T HAVE MONEY TO GET MORE.: NEVER TRUE

## 2025-02-11 SDOH — ECONOMIC STABILITY: INCOME INSECURITY: IN THE LAST 12 MONTHS, WAS THERE A TIME WHEN YOU WERE NOT ABLE TO PAY THE MORTGAGE OR RENT ON TIME?: NO

## 2025-02-11 SDOH — ECONOMIC STABILITY: FOOD INSECURITY: WITHIN THE PAST 12 MONTHS, YOU WORRIED THAT YOUR FOOD WOULD RUN OUT BEFORE YOU GOT MONEY TO BUY MORE.: NEVER TRUE

## 2025-02-11 SDOH — ECONOMIC STABILITY: TRANSPORTATION INSECURITY
IN THE PAST 12 MONTHS, HAS THE LACK OF TRANSPORTATION KEPT YOU FROM MEDICAL APPOINTMENTS OR FROM GETTING MEDICATIONS?: NO

## 2025-02-11 SDOH — ECONOMIC STABILITY: TRANSPORTATION INSECURITY
IN THE PAST 12 MONTHS, HAS LACK OF TRANSPORTATION KEPT YOU FROM MEETINGS, WORK, OR FROM GETTING THINGS NEEDED FOR DAILY LIVING?: NO

## 2025-02-12 DIAGNOSIS — E03.9 ACQUIRED HYPOTHYROIDISM: ICD-10-CM

## 2025-02-12 LAB — TSH SERPL-MCNC: 2.34 UIU/ML (ref 0.44–3.86)

## 2025-02-14 ENCOUNTER — OFFICE VISIT (OUTPATIENT)
Dept: FAMILY MEDICINE CLINIC | Age: 75
End: 2025-02-14
Payer: MEDICARE

## 2025-02-14 ENCOUNTER — HOSPITAL ENCOUNTER (OUTPATIENT)
Age: 75
Setting detail: SPECIMEN
Discharge: HOME | End: 2025-02-14
Payer: MEDICARE

## 2025-02-14 VITALS
SYSTOLIC BLOOD PRESSURE: 132 MMHG | RESPIRATION RATE: 16 BRPM | TEMPERATURE: 97.8 F | BODY MASS INDEX: 19.99 KG/M2 | HEART RATE: 78 BPM | WEIGHT: 120 LBS | DIASTOLIC BLOOD PRESSURE: 80 MMHG | HEIGHT: 65 IN | OXYGEN SATURATION: 97 %

## 2025-02-14 DIAGNOSIS — F41.9 ANXIETY: ICD-10-CM

## 2025-02-14 DIAGNOSIS — E03.9 ACQUIRED HYPOTHYROIDISM: Primary | ICD-10-CM

## 2025-02-14 DIAGNOSIS — R73.9 HYPERGLYCEMIA: ICD-10-CM

## 2025-02-14 DIAGNOSIS — Z78.9 VEGETARIAN DIET: ICD-10-CM

## 2025-02-14 DIAGNOSIS — F45.8 OTHER SOMATOFORM DISORDERS: ICD-10-CM

## 2025-02-14 PROCEDURE — 99214 OFFICE O/P EST MOD 30 MIN: CPT | Performed by: NURSE PRACTITIONER

## 2025-02-14 PROCEDURE — 82607 VITAMIN B-12: CPT

## 2025-02-14 PROCEDURE — 83550 IRON BINDING TEST: CPT

## 2025-02-14 PROCEDURE — 83036 HEMOGLOBIN GLYCOSYLATED A1C: CPT

## 2025-02-14 PROCEDURE — 1123F ACP DISCUSS/DSCN MKR DOCD: CPT | Performed by: NURSE PRACTITIONER

## 2025-02-14 PROCEDURE — 82728 ASSAY OF FERRITIN: CPT

## 2025-02-14 PROCEDURE — 83540 ASSAY OF IRON: CPT

## 2025-02-14 PROCEDURE — 36415 COLL VENOUS BLD VENIPUNCTURE: CPT | Performed by: NURSE PRACTITIONER

## 2025-02-14 RX ORDER — BUSPIRONE HYDROCHLORIDE 15 MG/1
15 TABLET ORAL 2 TIMES DAILY
Qty: 180 TABLET | Refills: 0 | Status: SHIPPED | OUTPATIENT
Start: 2025-02-14 | End: 2025-02-17 | Stop reason: SDUPTHER

## 2025-02-14 RX ORDER — BUSPIRONE HYDROCHLORIDE 5 MG/1
5 TABLET ORAL DAILY
Qty: 90 TABLET | Refills: 0 | Status: SHIPPED | OUTPATIENT
Start: 2025-02-14 | End: 2025-02-17 | Stop reason: SDUPTHER

## 2025-02-14 NOTE — PROGRESS NOTES
[facility]  [address]  [facility] [address]  Dept: [phone]  Dept Fax: [phone]  Loc: [phone]     Chief Complaint  Chief Complaint   Patient presents with    Anxiety     Follow up and discuss labs        HPI:  74 y.o.female who presents for the following:    She went 5 weeks without anxiety then she got a anxiety thing in the middle of the night worrying about having her labs drawn for her thyroid         After dinner bloating and gas  Seems like since she's been older   No changes in her bowels   No pain   More gas then it is bloating   Doesn't eat much meat really       Easy menopause   Would get hot flashes and she loved them     Hot flashes Anytime   Moist everywhere   Then it goes away   Not like she has to change her clothes or anything         Review of Systems   Constitutional:  Positive for diaphoresis. Negative for appetite change, chills, fatigue and fever.   HENT:  Negative for congestion, ear pain, rhinorrhea, sinus pressure, sinus pain, sore throat and trouble swallowing.    Eyes:  Negative for redness.   Respiratory:  Negative for cough, shortness of breath and wheezing.    Gastrointestinal:  Negative for diarrhea, nausea and vomiting.        Bloating and gas   Musculoskeletal:  Negative for myalgias.   Skin:  Negative for rash.   Neurological:  Negative for headaches.   Psychiatric/Behavioral:  Negative for agitation, confusion and hallucinations. The patient is nervous/anxious.        Past Medical History:   Diagnosis Date    Abnormal white blood cell (WBC) count 11/15/2016    Hiatal hernia     Hyperlipidemia     Hypothyroidism     Pap smear for cervical cancer screening 09/2011    normal    prophylaxis for Recurrent UTI 12/18/2013    Screening mammogram 09/2011    normal    Statin intolerance 12/08/2021     Past Surgical History:   Procedure Laterality Date    CHOLECYSTECTOMY

## 2025-02-15 LAB
ESTIMATED AVERAGE GLUCOSE: 120 MG/DL
FERRITIN: 87 NG/ML
HBA1C MFR BLD: 5.8 % (ref 4–6)
IRON % SATURATION: 29 % (ref 20–55)
IRON: 100 UG/DL (ref 37–145)
TOTAL IRON BINDING CAPACITY: 342 UG/DL (ref 250–450)
UNSATURATED IRON BINDING CAPACITY: 242 UG/DL (ref 112–347)
VITAMIN B-12: 700 PG/ML (ref 232–1245)

## 2025-02-17 DIAGNOSIS — F41.9 ANXIETY: ICD-10-CM

## 2025-02-17 PROBLEM — I83.10 VARICOSE VEINS OF LOWER EXTREMITIES WITH INFLAMMATION: Status: ACTIVE | Noted: 2025-02-17

## 2025-02-17 PROBLEM — Z78.9 STATIN INTOLERANCE: Status: RESOLVED | Noted: 2021-12-08 | Resolved: 2025-02-17

## 2025-02-17 RX ORDER — BUSPIRONE HYDROCHLORIDE 5 MG/1
5 TABLET ORAL DAILY
Qty: 90 TABLET | Refills: 0 | Status: SHIPPED | OUTPATIENT
Start: 2025-02-17 | End: 2025-05-18

## 2025-02-17 RX ORDER — BUSPIRONE HYDROCHLORIDE 15 MG/1
15 TABLET ORAL 2 TIMES DAILY
Qty: 180 TABLET | Refills: 0 | Status: SHIPPED | OUTPATIENT
Start: 2025-02-17 | End: 2025-05-18

## 2025-02-17 ASSESSMENT — ENCOUNTER SYMPTOMS
DIARRHEA: 0
WHEEZING: 0
COUGH: 0
TROUBLE SWALLOWING: 0
SHORTNESS OF BREATH: 0
SINUS PRESSURE: 0
RHINORRHEA: 0
EYE REDNESS: 0
VOMITING: 0
NAUSEA: 0
SORE THROAT: 0
SINUS PAIN: 0

## 2025-02-17 NOTE — TELEPHONE ENCOUNTER
Comments:     Last Office Visit (last PCP visit):   2/14/2025    Next Visit Date:  Future Appointments   Date Time Provider Department Center   6/16/2025 10:00 AM Cele East APRN - CNP [facility]   1/5/2026  8:45 AM Cele East APRN - CNP [facility]       **If hasn't been seen in over a year OR hasn't followed up according to last diabetes/ADHD visit, make appointment for patient before sending refill to provider.    Rx requested:  Requested Prescriptions     Pending Prescriptions Disp Refills    busPIRone (BUSPAR) 15 MG tablet 180 tablet 0     Sig: Take 15 mg by mouth 2 times daily    busPIRone (BUSPAR) 5 MG tablet 90 tablet 0     Sig: Take 1 tablet by mouth daily

## 2025-02-24 ENCOUNTER — PATIENT MESSAGE (OUTPATIENT)
Dept: FAMILY MEDICINE CLINIC | Age: 75
End: 2025-02-24

## 2025-02-24 DIAGNOSIS — F41.9 ANXIETY: Primary | ICD-10-CM

## 2025-02-24 RX ORDER — MIRTAZAPINE 7.5 MG/1
7.5 TABLET, FILM COATED ORAL NIGHTLY
Qty: 30 TABLET | Refills: 0 | Status: SHIPPED | OUTPATIENT
Start: 2025-02-24

## 2025-03-26 ENCOUNTER — OFFICE VISIT (OUTPATIENT)
Dept: FAMILY MEDICINE CLINIC | Age: 75
End: 2025-03-26
Payer: MEDICARE

## 2025-03-26 VITALS
TEMPERATURE: 96.9 F | DIASTOLIC BLOOD PRESSURE: 72 MMHG | HEIGHT: 65 IN | OXYGEN SATURATION: 99 % | BODY MASS INDEX: 19.97 KG/M2 | HEART RATE: 77 BPM | SYSTOLIC BLOOD PRESSURE: 134 MMHG

## 2025-03-26 DIAGNOSIS — J01.00 ACUTE NON-RECURRENT MAXILLARY SINUSITIS: Primary | ICD-10-CM

## 2025-03-26 LAB
INFLUENZA A ANTIBODY: NORMAL
INFLUENZA B ANTIBODY: NORMAL
Lab: NORMAL
PERFORMING INSTRUMENT: NORMAL
QC PASS/FAIL: NORMAL
SARS-COV-2, POC: NORMAL

## 2025-03-26 PROCEDURE — 87426 SARSCOV CORONAVIRUS AG IA: CPT | Performed by: FAMILY MEDICINE

## 2025-03-26 PROCEDURE — 87804 INFLUENZA ASSAY W/OPTIC: CPT | Performed by: FAMILY MEDICINE

## 2025-03-26 RX ORDER — DOXYCYCLINE HYCLATE 100 MG
100 TABLET ORAL 2 TIMES DAILY
Qty: 20 TABLET | Refills: 0 | Status: SHIPPED | OUTPATIENT
Start: 2025-03-26 | End: 2025-04-05

## 2025-03-26 NOTE — PROGRESS NOTES
[facility]  [address] [place] [id number]  Dept: [phone]  Dept Fax: [phone]     Chief Complaint:  Chief Complaint   Patient presents with    Sinusitis     Sinus pressure,blood and post nasal drip running down throat x 2 days. She denies fever, cough.       Vitals:    03/26/25 0947   BP: 134/72   Pulse: 77   Temp: 96.9 °F (36.1 °C)   SpO2: 99%   Height: 1.651 m (5' 5\")       HPI:  74 y.o.female who presents for the following:      URI symptoms: long standing sinus drainage which isn't new; over the past 3 days having more postnasal drip; cough up a teaspoon full of blood once; then this happened again yesterday; no bloody nose; no cough or congestion; but has some b/l maxillary pressure; usually takes flonase regularly at night    -----------------------------------------------------------------------------    Assessment/Plan:  74 y.o. female here mainly for the following:  Sinusitis  Will tx as bacterial sinus infection with doxy        ICD-10-CM    1. Acute non-recurrent maxillary sinusitis  J01.00 POCT Influenza A/B     POCT COVID-19, Antigen     doxycycline hyclate (VIBRA-TABS) 100 MG tablet          Return if symptoms worsen or fail to improve.    German Tillman MD      -----------------------------------------------------------------------------      Objective     Physical Exam:  Physical Exam  Vitals reviewed.   Constitutional:       General: She is not in acute distress.     Appearance: She is well-developed.   HENT:      Head: Normocephalic and atraumatic.      Right Ear: Tympanic membrane, ear canal and external ear normal. Tympanic membrane is not erythematous. Tympanic membrane has normal mobility.      Left Ear: Tympanic membrane, ear canal and external ear normal. Tympanic membrane is not erythematous. Tympanic membrane has normal mobility.      Nose: Nose normal.      Mouth/Throat:      Pharynx: No oropharyngeal exudate.   Neck:      Thyroid: No

## 2025-04-05 ENCOUNTER — PATIENT MESSAGE (OUTPATIENT)
Dept: FAMILY MEDICINE CLINIC | Age: 75
End: 2025-04-05

## 2025-04-23 ENCOUNTER — OFFICE VISIT (OUTPATIENT)
Dept: FAMILY MEDICINE CLINIC | Age: 75
End: 2025-04-23
Payer: MEDICARE

## 2025-04-23 VITALS
DIASTOLIC BLOOD PRESSURE: 78 MMHG | BODY MASS INDEX: 20.43 KG/M2 | SYSTOLIC BLOOD PRESSURE: 132 MMHG | OXYGEN SATURATION: 97 % | HEIGHT: 65 IN | WEIGHT: 122.6 LBS | HEART RATE: 78 BPM

## 2025-04-23 DIAGNOSIS — R44.8 PARESTHESIA OF TONGUE: ICD-10-CM

## 2025-04-23 DIAGNOSIS — R14.3 FLATUS: Primary | ICD-10-CM

## 2025-04-23 PROCEDURE — 99213 OFFICE O/P EST LOW 20 MIN: CPT | Performed by: NURSE PRACTITIONER

## 2025-04-23 PROCEDURE — 1160F RVW MEDS BY RX/DR IN RCRD: CPT | Performed by: NURSE PRACTITIONER

## 2025-04-23 PROCEDURE — 1123F ACP DISCUSS/DSCN MKR DOCD: CPT | Performed by: NURSE PRACTITIONER

## 2025-04-23 PROCEDURE — 1159F MED LIST DOCD IN RCRD: CPT | Performed by: NURSE PRACTITIONER

## 2025-04-23 ASSESSMENT — ENCOUNTER SYMPTOMS
SHORTNESS OF BREATH: 0
NAUSEA: 0
WHEEZING: 0
TROUBLE SWALLOWING: 0
EYE REDNESS: 0
RHINORRHEA: 0
DIARRHEA: 0
VOMITING: 0
COUGH: 0
SORE THROAT: 0

## 2025-04-23 NOTE — PROGRESS NOTES
[facility]  [address]  [facility] [address]  Dept: [phone]  Dept Fax: [phone]  Loc: [phone]     Chief Complaint  Chief Complaint   Patient presents with    Discuss Medications     Buspirone, has been on this since 11/3. Feels like she is having different interactions and would like to discuss.       HPI:  74 y.o.female who presents for the following:      Still on 30 mg buspar  15mg 9AM  15mg 3pm   About an hour after taking dose of Buspar tongue gets numb/prickly and feels funny and sometimes she feels like she's talking different   No swelling  This is about an every day occurrence   Its lasts for maybe a couple hours at the longest but usually not that long  Notices every time she takes it   She already had some paraesthesia of the mouth related to anxiety prior to this but this is notable after taking the dose now     Mirtazapine for a short while -she took for maybe 2 weeks and then stopped     Sleep perfect at night   Not having anxiety   She is doing really well     Going to cut back to 15 mg nightly          Review of Systems   Constitutional:  Negative for appetite change, chills, fatigue and fever.   HENT:  Negative for congestion, ear pain, rhinorrhea, sore throat and trouble swallowing.    Eyes:  Negative for redness.   Respiratory:  Negative for cough, shortness of breath and wheezing.    Gastrointestinal:  Negative for diarrhea, nausea and vomiting.   Musculoskeletal:  Negative for myalgias.   Skin:  Negative for rash.   Neurological:  Positive for numbness (of tongue). Negative for headaches.   Psychiatric/Behavioral:  Negative for agitation and confusion. The patient is not hyperactive.        Past Medical History:   Diagnosis Date    Abnormal white blood cell (WBC) count 11/15/2016    Hiatal hernia     Hyperlipidemia     Hypothyroidism     Pap smear for cervical cancer screening 09/2011

## 2025-05-21 ENCOUNTER — OFFICE VISIT (OUTPATIENT)
Dept: FAMILY MEDICINE CLINIC | Age: 75
End: 2025-05-21
Payer: MEDICARE

## 2025-05-21 VITALS
HEIGHT: 65 IN | BODY MASS INDEX: 20.12 KG/M2 | HEART RATE: 92 BPM | WEIGHT: 120.8 LBS | DIASTOLIC BLOOD PRESSURE: 82 MMHG | OXYGEN SATURATION: 98 % | SYSTOLIC BLOOD PRESSURE: 122 MMHG

## 2025-05-21 DIAGNOSIS — F41.9 ANXIETY: Primary | ICD-10-CM

## 2025-05-21 PROCEDURE — 99214 OFFICE O/P EST MOD 30 MIN: CPT | Performed by: NURSE PRACTITIONER

## 2025-05-21 PROCEDURE — 1160F RVW MEDS BY RX/DR IN RCRD: CPT | Performed by: NURSE PRACTITIONER

## 2025-05-21 PROCEDURE — 1123F ACP DISCUSS/DSCN MKR DOCD: CPT | Performed by: NURSE PRACTITIONER

## 2025-05-21 PROCEDURE — 1159F MED LIST DOCD IN RCRD: CPT | Performed by: NURSE PRACTITIONER

## 2025-05-21 RX ORDER — CITALOPRAM HYDROBROMIDE 10 MG/1
10 TABLET ORAL DAILY
Qty: 30 TABLET | Refills: 0 | Status: SHIPPED | OUTPATIENT
Start: 2025-05-21 | End: 2025-06-20

## 2025-05-21 RX ORDER — BUSPIRONE HYDROCHLORIDE 15 MG/1
15 TABLET ORAL 2 TIMES DAILY
COMMUNITY

## 2025-05-21 RX ORDER — CITALOPRAM HYDROBROMIDE 10 MG/1
10 TABLET ORAL DAILY
Qty: 90 TABLET | Refills: 0 | Status: SHIPPED | OUTPATIENT
Start: 2025-05-21 | End: 2025-08-19

## 2025-05-21 NOTE — PROGRESS NOTES
[facility] PRIMARY CARE  [facility] [address]  [facility] [address]  Dept: [phone]  Dept Fax: [phone]  Loc: [phone]     Chief Complaint  Chief Complaint   Patient presents with    medication follow up     Buspar; states that she is having a side effects. When she takes it at night she feels like she has a very salty tongue, only going on since taking 30 MG at night, and it feels this way for a few days after.        HPI:  74 y.o.female who presents for the following:    History of Present Illness  The patient is a 74-year-old female who presents for evaluation of anxiety and medication adjustment.    She has been experiencing a persistent metallic, salty taste in her mouth since increasing her BuSpar dosage to 30 mg at night. This side effect has been present for several months but was not as pronounced when she was on a regimen of 15 mg in the morning and 15 mg at night. Despite attempts to reduce the dosage, the metallic taste persisted. She discontinued Flonase and saline spray over the weekend due to concerns about overuse, but the taste remains. She reports that the sensation triggers her anxiety. She has considered trying Celexa or sertraline, having previously tried sertraline for a month without success. Her sleep has improved. She has also tried mirtazapine for a few days to help with sleep. She has sought counseling from [facility] in [place], which she found beneficial. She does not believe her symptoms are related to her sinuses. She has been using BuSpar since 11/03/2024.    She reports no coating on her tongue and maintains good oral hygiene. She recently visited her dentist, Dr. Joiner in [place], for a toothache, but no abnormalities were found. She was prescribed amoxicillin 500 mg but has not taken it as her toothache has resolved. She experienced diarrhea and stomachache yesterday, which she attributes to

## 2025-05-24 DIAGNOSIS — F41.9 ANXIETY: ICD-10-CM

## 2025-05-27 RX ORDER — BUSPIRONE HYDROCHLORIDE 15 MG/1
15 TABLET ORAL 2 TIMES DAILY
Qty: 180 TABLET | Refills: 1 | Status: SHIPPED | OUTPATIENT
Start: 2025-05-27

## 2025-05-27 RX ORDER — BUSPIRONE HYDROCHLORIDE 5 MG/1
5 TABLET ORAL DAILY
Qty: 90 TABLET | Refills: 1 | Status: SHIPPED | OUTPATIENT
Start: 2025-05-27 | End: 2025-08-25

## 2025-05-27 NOTE — TELEPHONE ENCOUNTER
Comments:     Last Office Visit (last PCP visit):   5/21/2025    Next Visit Date:  Future Appointments   Date Time Provider Department Center   6/16/2025 10:00 AM Cele [facility] APRN - CNP [facility] DEP   1/5/2026  8:45 AM Cele [facility] APRN - CNP [facility] DEP       **If hasn't been seen in over a year OR hasn't followed up according to last diabetes/ADHD visit, make appointment for patient before sending refill to provider.    Rx requested:  Requested Prescriptions     Pending Prescriptions Disp Refills    busPIRone (BUSPAR) 5 MG tablet [Pharmacy Med Name: BUSPIRONE  5MG  TAB] 90 tablet 0     Sig: TAKE 1 TABLET BY MOUTH DAILY    busPIRone (BUSPAR) 15 MG tablet [Pharmacy Med Name: BUSPIRONE  15MG  TAB] 180 tablet      Sig: TAKE 1 TABLET BY MOUTH TWICE  DAILY

## 2025-06-16 ENCOUNTER — OFFICE VISIT (OUTPATIENT)
Dept: FAMILY MEDICINE CLINIC | Age: 75
End: 2025-06-16
Payer: MEDICARE

## 2025-06-16 ENCOUNTER — HOSPITAL ENCOUNTER (OUTPATIENT)
Age: 75
Setting detail: SPECIMEN
Discharge: HOME | End: 2025-06-16
Payer: MEDICARE

## 2025-06-16 VITALS
DIASTOLIC BLOOD PRESSURE: 84 MMHG | BODY MASS INDEX: 20.23 KG/M2 | HEART RATE: 67 BPM | HEIGHT: 65 IN | WEIGHT: 121.4 LBS | SYSTOLIC BLOOD PRESSURE: 120 MMHG

## 2025-06-16 DIAGNOSIS — E03.9 ACQUIRED HYPOTHYROIDISM: ICD-10-CM

## 2025-06-16 DIAGNOSIS — M81.0 SENILE OSTEOPOROSIS: Primary | ICD-10-CM

## 2025-06-16 DIAGNOSIS — M81.0 SENILE OSTEOPOROSIS: ICD-10-CM

## 2025-06-16 LAB
ANION GAP SERPL CALCULATED.3IONS-SCNC: 10 MEQ/L (ref 9–15)
BUN SERPL-MCNC: 15 MG/DL (ref 8–23)
CALCIUM SERPL-MCNC: 9.6 MG/DL (ref 8.5–9.9)
CHLORIDE SERPL-SCNC: 103 MEQ/L (ref 95–107)
CO2 SERPL-SCNC: 27 MEQ/L (ref 20–31)
CREAT SERPL-MCNC: 0.66 MG/DL (ref 0.5–0.9)
GLUCOSE SERPL-MCNC: 82 MG/DL (ref 70–99)
POTASSIUM SERPL-SCNC: 4.5 MEQ/L (ref 3.4–4.9)
SODIUM SERPL-SCNC: 140 MEQ/L (ref 135–144)
TSH SERPL-MCNC: 0.28 UIU/ML (ref 0.44–3.86)

## 2025-06-16 PROCEDURE — 36415 COLL VENOUS BLD VENIPUNCTURE: CPT | Performed by: NURSE PRACTITIONER

## 2025-06-16 PROCEDURE — 1159F MED LIST DOCD IN RCRD: CPT | Performed by: NURSE PRACTITIONER

## 2025-06-16 PROCEDURE — 84443 ASSAY THYROID STIM HORMONE: CPT

## 2025-06-16 PROCEDURE — 1160F RVW MEDS BY RX/DR IN RCRD: CPT | Performed by: NURSE PRACTITIONER

## 2025-06-16 PROCEDURE — 80048 BASIC METABOLIC PNL TOTAL CA: CPT

## 2025-06-16 PROCEDURE — 1123F ACP DISCUSS/DSCN MKR DOCD: CPT | Performed by: NURSE PRACTITIONER

## 2025-06-16 PROCEDURE — 99213 OFFICE O/P EST LOW 20 MIN: CPT | Performed by: NURSE PRACTITIONER

## 2025-06-16 RX ORDER — FLUTICASONE PROPIONATE 50 MCG
1 SPRAY, SUSPENSION (ML) NASAL DAILY PRN
COMMUNITY

## 2025-06-16 ASSESSMENT — ENCOUNTER SYMPTOMS
RHINORRHEA: 0
NAUSEA: 0
SINUS PAIN: 0
EYE REDNESS: 0
SHORTNESS OF BREATH: 0
VOMITING: 0
WHEEZING: 0
TROUBLE SWALLOWING: 0
SORE THROAT: 0
DIARRHEA: 0
SINUS PRESSURE: 0
COUGH: 0

## 2025-06-16 NOTE — PROGRESS NOTES
[facility] PRIMARY CARE  [facility] [address]  [facility] [address]  Dept: [phone]  Dept Fax: [phone]  Loc: [phone]     Chief Complaint  Chief Complaint   Patient presents with    Anxiety    Hypothyroidism    Orders     Having prolia given in July, needs to have calcium checked for this.        HPI:  75 y.o.female who presents for the following:    History of Present Illness  The patient is a 75-year-old female who presents for a thyroid follow-up.    She is scheduled to receive her Prolia injection in July 2025. Blood work, including calcium levels, will be conducted at the [facility] prior to the injection. She will obtain a copy of the results and send it to them. Her vitamin D levels were assessed in January 2025 and found to be within normal range.    She attributes the alteration in her thyroid medication regimen to stress and weight loss. She reports no missed doses and feels well overall. Currently, she takes her thyroid medication daily in the morning, an hour before eating.    She continues to experience a salty taste in her sinuses, which she first noticed on 05/18/2025, a week after starting a nightly dose of 30 mg of BuSpar. This symptom was not present prior to the initiation of large night dose BuSpar. Since 05/24/2025, she has been taking BuSpar twice daily again. She does not report any dry mouth symptoms. She has attempted to manage her symptoms with Zyrtec and Flonase, but these have not been effective. She also tried Allegra, but this resulted in an entire day of a salty taste in her mouth. She has discontinued all medications except for Flonase, which she uses sparingly at night. She has not used a neti pot but continues to use saline spray as needed. She does not experience itchy eyes. She has previously tried Claritin and Zyrtec without significant relief.    She has discontinued Celexa due

## 2025-06-17 ENCOUNTER — RESULTS FOLLOW-UP (OUTPATIENT)
Dept: FAMILY MEDICINE CLINIC | Age: 75
End: 2025-06-17

## 2025-06-17 DIAGNOSIS — E03.9 ACQUIRED HYPOTHYROIDISM: Primary | ICD-10-CM

## 2025-06-17 RX ORDER — LEVOTHYROXINE SODIUM 75 UG/1
75 TABLET ORAL DAILY
Qty: 30 TABLET | Refills: 2 | Status: SHIPPED | OUTPATIENT
Start: 2025-06-17 | End: 2025-07-01

## 2025-07-01 ENCOUNTER — PATIENT MESSAGE (OUTPATIENT)
Dept: FAMILY MEDICINE CLINIC | Age: 75
End: 2025-07-01

## 2025-07-01 DIAGNOSIS — E03.9 ACQUIRED HYPOTHYROIDISM: ICD-10-CM

## 2025-07-01 RX ORDER — LEVOTHYROXINE SODIUM 88 UG/1
88 TABLET ORAL DAILY
Qty: 90 TABLET | Refills: 3 | Status: SHIPPED | OUTPATIENT
Start: 2025-07-01 | End: 2025-09-29

## 2025-07-01 RX ORDER — LEVOTHYROXINE SODIUM 88 UG/1
88 TABLET ORAL DAILY
Qty: 14 TABLET | Refills: 0 | Status: SHIPPED | OUTPATIENT
Start: 2025-07-01

## 2025-07-16 DIAGNOSIS — R21 RASH: ICD-10-CM

## 2025-07-16 DIAGNOSIS — E78.00 PURE HYPERCHOLESTEROLEMIA: ICD-10-CM

## 2025-07-16 RX ORDER — ROSUVASTATIN CALCIUM 5 MG/1
5 TABLET, COATED ORAL NIGHTLY
Qty: 100 TABLET | Refills: 2 | Status: SHIPPED | OUTPATIENT
Start: 2025-07-16

## 2025-07-16 RX ORDER — TRIAMCINOLONE ACETONIDE 1 MG/G
CREAM TOPICAL 2 TIMES DAILY
Qty: 160 G | Refills: 3 | Status: SHIPPED | OUTPATIENT
Start: 2025-07-16

## 2025-07-16 NOTE — TELEPHONE ENCOUNTER
Comments:     Last Office Visit (last PCP visit):   6/16/2025    Next Visit Date:  Future Appointments   Date Time Provider Department Center   1/5/2026  8:45 AM Cele [facility] APRN - CNP [facility] DEP       **If hasn't been seen in over a year OR hasn't followed up according to last diabetes/ADHD visit, make appointment for patient before sending refill to provider.    Rx requested:  Requested Prescriptions     Pending Prescriptions Disp Refills    rosuvastatin (CRESTOR) 5 MG tablet [Pharmacy Med Name: Rosuvastatin Calcium 5 MG Oral Tablet] 100 tablet 2     Sig: TAKE 1 TABLET BY MOUTH EVERY  NIGHT    triamcinolone (KENALOG) 0.1 % cream [Pharmacy Med Name: TRIAMCINOLONE CRE 0.1%] 160 g      Sig: APPLY TOPICALLY TWICE DAILY

## 2025-08-07 ENCOUNTER — PATIENT MESSAGE (OUTPATIENT)
Dept: FAMILY MEDICINE CLINIC | Age: 75
End: 2025-08-07

## 2025-08-07 DIAGNOSIS — E03.9 ACQUIRED HYPOTHYROIDISM: Primary | ICD-10-CM

## 2025-08-18 DIAGNOSIS — E03.9 ACQUIRED HYPOTHYROIDISM: ICD-10-CM

## 2025-08-18 LAB — TSH SERPL-MCNC: 1.03 UIU/ML (ref 0.44–3.86)

## 2025-09-03 ENCOUNTER — OFFICE VISIT (OUTPATIENT)
Dept: FAMILY MEDICINE CLINIC | Age: 75
End: 2025-09-03
Payer: MEDICARE

## 2025-09-03 VITALS
HEART RATE: 86 BPM | SYSTOLIC BLOOD PRESSURE: 134 MMHG | BODY MASS INDEX: 19.93 KG/M2 | HEIGHT: 65 IN | DIASTOLIC BLOOD PRESSURE: 82 MMHG | OXYGEN SATURATION: 96 % | WEIGHT: 119.6 LBS

## 2025-09-03 DIAGNOSIS — F41.9 ANXIETY: Primary | ICD-10-CM

## 2025-09-03 PROCEDURE — 99214 OFFICE O/P EST MOD 30 MIN: CPT | Performed by: NURSE PRACTITIONER

## 2025-09-03 PROCEDURE — 1123F ACP DISCUSS/DSCN MKR DOCD: CPT | Performed by: NURSE PRACTITIONER

## 2025-09-03 PROCEDURE — 1160F RVW MEDS BY RX/DR IN RCRD: CPT | Performed by: NURSE PRACTITIONER

## 2025-09-03 PROCEDURE — 1159F MED LIST DOCD IN RCRD: CPT | Performed by: NURSE PRACTITIONER

## 2025-09-03 RX ORDER — ESCITALOPRAM OXALATE 5 MG/1
5 TABLET ORAL DAILY
Qty: 30 TABLET | Refills: 0 | Status: SHIPPED | OUTPATIENT
Start: 2025-09-03 | End: 2025-10-03

## 2025-09-03 ASSESSMENT — ENCOUNTER SYMPTOMS
NAUSEA: 0
SORE THROAT: 0
EYE REDNESS: 0
SHORTNESS OF BREATH: 0
RHINORRHEA: 0
WHEEZING: 0
VOMITING: 0
COUGH: 0
DIARRHEA: 0
TROUBLE SWALLOWING: 0